# Patient Record
Sex: FEMALE | Race: OTHER | Employment: FULL TIME | URBAN - METROPOLITAN AREA
[De-identification: names, ages, dates, MRNs, and addresses within clinical notes are randomized per-mention and may not be internally consistent; named-entity substitution may affect disease eponyms.]

---

## 2017-02-25 ENCOUNTER — OFFICE VISIT (OUTPATIENT)
Dept: FAMILY MEDICINE CLINIC | Facility: CLINIC | Age: 37
End: 2017-02-25

## 2017-02-25 ENCOUNTER — TELEPHONE (OUTPATIENT)
Dept: FAMILY MEDICINE CLINIC | Facility: CLINIC | Age: 37
End: 2017-02-25

## 2017-02-25 VITALS
BODY MASS INDEX: 20.65 KG/M2 | WEIGHT: 112.19 LBS | HEART RATE: 92 BPM | TEMPERATURE: 98 F | SYSTOLIC BLOOD PRESSURE: 138 MMHG | RESPIRATION RATE: 16 BRPM | HEIGHT: 62 IN | DIASTOLIC BLOOD PRESSURE: 94 MMHG

## 2017-02-25 DIAGNOSIS — J45.30 MILD PERSISTENT ASTHMA WITHOUT COMPLICATION: ICD-10-CM

## 2017-02-25 DIAGNOSIS — J06.9 UPPER RESPIRATORY TRACT INFECTION, UNSPECIFIED TYPE: Primary | ICD-10-CM

## 2017-02-25 PROCEDURE — 99213 OFFICE O/P EST LOW 20 MIN: CPT | Performed by: FAMILY MEDICINE

## 2017-02-25 PROCEDURE — 99212 OFFICE O/P EST SF 10 MIN: CPT | Performed by: FAMILY MEDICINE

## 2017-02-25 RX ORDER — ALBUTEROL SULFATE 90 UG/1
2 AEROSOL, METERED RESPIRATORY (INHALATION) EVERY 4 HOURS PRN
Qty: 18 G | Refills: 1 | Status: SHIPPED | OUTPATIENT
Start: 2017-02-25 | End: 2017-06-05

## 2017-02-25 RX ORDER — ENALAPRIL MALEATE 5 MG/1
TABLET ORAL
Qty: 180 TABLET | Refills: 0 | Status: SHIPPED | OUTPATIENT
Start: 2017-02-25 | End: 2017-06-05

## 2017-02-25 RX ORDER — PROMETHAZINE HYDROCHLORIDE AND CODEINE PHOSPHATE 6.25; 1 MG/5ML; MG/5ML
5 SYRUP ORAL EVERY 6 HOURS PRN
Qty: 180 ML | Refills: 0 | Status: SHIPPED | OUTPATIENT
Start: 2017-02-25 | End: 2017-06-05

## 2017-02-25 RX ORDER — DEXAMETHASONE 4 MG/1
4 TABLET ORAL
Qty: 4 TABLET | Refills: 0 | Status: SHIPPED | OUTPATIENT
Start: 2017-02-25 | End: 2017-03-01

## 2017-02-25 RX ORDER — AZELASTINE 1 MG/ML
2 SPRAY, METERED NASAL 2 TIMES DAILY
Qty: 1 BOTTLE | Refills: 0 | Status: SHIPPED | OUTPATIENT
Start: 2017-02-25 | End: 2017-06-05

## 2017-02-25 RX ORDER — ENALAPRIL MALEATE 10 MG/1
TABLET ORAL
Qty: 180 TABLET | Refills: 0 | Status: SHIPPED | OUTPATIENT
Start: 2017-02-25 | End: 2017-06-05

## 2017-02-25 NOTE — TELEPHONE ENCOUNTER
Patient requesting refill. Current outpatient prescriptions:   •  Amphetamine-Dextroamphet ER (ADDERALL XR) 30 MG Oral Capsule SR 24 Hr, Take 1 capsule (30 mg total) by mouth daily. , Disp: 30 capsule, Rfl: 0

## 2017-02-25 NOTE — PROGRESS NOTES
Patient ID: Angel Chavez is a 31year old female. HPI  Patient presents with:  Sore Throat  Cough  Sinusitis      She states 2 days ago she started with a cough, runny nose, sore throat and some sinus pressure.   She states that the cough is really with clear rhinorrhea. OP: clear post nasal drainage without cobblestoning. Tonsils: normal   Eyes: Conjunctivae are normal.   Neck: Normal range of motion. Neck supple. No thyromegaly present.    Cardiovascular: Normal rate, regular rhythm and normal hea persist.  Take medicine (if given) as prescribed. Approach to treatment discussed and patient/family member understands and agrees to plan.            Nisha Chapa,   2/25/2017

## 2017-02-28 RX ORDER — DEXTROAMPHETAMINE SACCHARATE, AMPHETAMINE ASPARTATE MONOHYDRATE, DEXTROAMPHETAMINE SULFATE AND AMPHETAMINE SULFATE 7.5; 7.5; 7.5; 7.5 MG/1; MG/1; MG/1; MG/1
30 CAPSULE, EXTENDED RELEASE ORAL DAILY
Qty: 30 CAPSULE | Refills: 0 | Status: SHIPPED | OUTPATIENT
Start: 2017-03-03 | End: 2017-04-08

## 2017-04-08 NOTE — TELEPHONE ENCOUNTER
Please advise on refill. Pending medication.  Thank you    Refill Protocol Appointment Criteria  · Appointment scheduled in the past 6 months or in the next 3 months  Recent Visits       Provider Department Primary Dx    1 month ago DO Callie Velásquez

## 2017-04-10 RX ORDER — DEXTROAMPHETAMINE SACCHARATE, AMPHETAMINE ASPARTATE MONOHYDRATE, DEXTROAMPHETAMINE SULFATE AND AMPHETAMINE SULFATE 7.5; 7.5; 7.5; 7.5 MG/1; MG/1; MG/1; MG/1
30 CAPSULE, EXTENDED RELEASE ORAL DAILY
Qty: 30 CAPSULE | Refills: 0 | Status: SHIPPED | OUTPATIENT
Start: 2017-04-10 | End: 2017-05-08

## 2017-04-12 NOTE — TELEPHONE ENCOUNTER
No further ation needed. Rx picked up on Monday and patient has a pending appt with MD on 4/15. No further action neded.

## 2017-05-08 ENCOUNTER — TELEPHONE (OUTPATIENT)
Dept: FAMILY MEDICINE CLINIC | Facility: CLINIC | Age: 37
End: 2017-05-08

## 2017-05-08 RX ORDER — DEXTROAMPHETAMINE SACCHARATE, AMPHETAMINE ASPARTATE MONOHYDRATE, DEXTROAMPHETAMINE SULFATE AND AMPHETAMINE SULFATE 7.5; 7.5; 7.5; 7.5 MG/1; MG/1; MG/1; MG/1
30 CAPSULE, EXTENDED RELEASE ORAL DAILY
Qty: 30 CAPSULE | Refills: 0 | Status: SHIPPED | OUTPATIENT
Start: 2017-05-08 | End: 2017-06-05

## 2017-05-08 NOTE — TELEPHONE ENCOUNTER
Pt requesting a refill for Adderall, Pt stts due to her work schedule she is unable to schedule an OV.

## 2017-06-05 ENCOUNTER — OFFICE VISIT (OUTPATIENT)
Dept: FAMILY MEDICINE CLINIC | Facility: CLINIC | Age: 37
End: 2017-06-05

## 2017-06-05 VITALS
BODY MASS INDEX: 20.06 KG/M2 | HEART RATE: 94 BPM | HEIGHT: 62 IN | DIASTOLIC BLOOD PRESSURE: 78 MMHG | SYSTOLIC BLOOD PRESSURE: 114 MMHG | WEIGHT: 109 LBS | TEMPERATURE: 98 F

## 2017-06-05 DIAGNOSIS — J30.1 ALLERGIC RHINITIS DUE TO POLLEN, UNSPECIFIED RHINITIS SEASONALITY: ICD-10-CM

## 2017-06-05 DIAGNOSIS — Z23 NEED FOR VACCINATION: ICD-10-CM

## 2017-06-05 DIAGNOSIS — I10 ESSENTIAL HYPERTENSION: ICD-10-CM

## 2017-06-05 DIAGNOSIS — Z00.00 ADULT GENERAL MEDICAL EXAM: Primary | ICD-10-CM

## 2017-06-05 DIAGNOSIS — N05.5 MEMBRANOPROLIFERATIVE NEPHROPATHY: ICD-10-CM

## 2017-06-05 DIAGNOSIS — F90.0 ATTENTION DEFICIT HYPERACTIVITY DISORDER (ADHD), PREDOMINANTLY INATTENTIVE TYPE: ICD-10-CM

## 2017-06-05 PROCEDURE — 99395 PREV VISIT EST AGE 18-39: CPT | Performed by: FAMILY MEDICINE

## 2017-06-05 PROCEDURE — 90471 IMMUNIZATION ADMIN: CPT | Performed by: FAMILY MEDICINE

## 2017-06-05 PROCEDURE — 90715 TDAP VACCINE 7 YRS/> IM: CPT | Performed by: FAMILY MEDICINE

## 2017-06-05 PROCEDURE — 99212 OFFICE O/P EST SF 10 MIN: CPT | Performed by: FAMILY MEDICINE

## 2017-06-05 RX ORDER — ENALAPRIL MALEATE 5 MG/1
TABLET ORAL
Qty: 180 TABLET | Refills: 0 | Status: SHIPPED | OUTPATIENT
Start: 2017-06-05 | End: 2018-01-29

## 2017-06-05 RX ORDER — ENALAPRIL MALEATE 10 MG/1
TABLET ORAL
Qty: 180 TABLET | Refills: 0 | Status: SHIPPED | OUTPATIENT
Start: 2017-06-05 | End: 2018-01-29

## 2017-06-05 RX ORDER — DEXTROAMPHETAMINE SACCHARATE, AMPHETAMINE ASPARTATE MONOHYDRATE, DEXTROAMPHETAMINE SULFATE AND AMPHETAMINE SULFATE 7.5; 7.5; 7.5; 7.5 MG/1; MG/1; MG/1; MG/1
30 CAPSULE, EXTENDED RELEASE ORAL DAILY
Qty: 30 CAPSULE | Refills: 0 | Status: SHIPPED | OUTPATIENT
Start: 2017-06-05 | End: 2017-07-05

## 2017-06-05 NOTE — PROGRESS NOTES
Patient ID: Francoise Bhakta is a 31year old female. HPI  Patient presents with:  Physical  Medication Request    She usually sees my partner Dr. Aruna Gutierrez. She is here for a physical exam but has medications that she needs refill.   She has not seen her n rash.   Allergic/Immunologic: Positive for environmental allergies. Negative for food allergies. Neurological: Negative for dizziness, seizures, speech difficulty and light-headedness. Hematological: Negative for adenopathy.  Does not bruise/bleed easil Nasal Solution 2 sprays by Nasal route 2 (two) times daily. Disp: 1 Bottle Rfl: 0   Cholecalciferol (VITAMIN D-3) 5000 UNITS Oral Tab Take 1 tablet by mouth.  Disp:  Rfl:      Allergies:No Known Allergies   PHYSICAL EXAM:   Physical Exam  Physical Exam   Co ordered  Essential hypertension  -     Nephrology - Dr Davey Mancuso  -     Enalapril Maleate 10 MG Oral Tab; TAKE 1 TABLET BY MOUTH   TWICE A DAY  -     Enalapril Maleate 5 MG Oral Tab; TAKE 1 TABLET BY MOUTH   TWICE A DAY    Attention deficit hyperactivity di

## 2017-06-06 NOTE — TELEPHONE ENCOUNTER
Refill Protocol Appointment Criteria: Refilled per protocol    · Appointment scheduled in the past 6 months or in the next 3 months  Recent Visits       Provider Department Primary Dx    Handy Penaloza, [facility], Brock Rodrigues

## 2017-06-17 ENCOUNTER — LAB ENCOUNTER (OUTPATIENT)
Dept: LAB | Facility: HOSPITAL | Age: 37
End: 2017-06-17
Attending: FAMILY MEDICINE
Payer: COMMERCIAL

## 2017-06-17 DIAGNOSIS — Z00.00 ADULT GENERAL MEDICAL EXAM: ICD-10-CM

## 2017-06-17 DIAGNOSIS — N05.5 MEMBRANOPROLIFERATIVE NEPHROPATHY: ICD-10-CM

## 2017-06-17 PROCEDURE — 82570 ASSAY OF URINE CREATININE: CPT

## 2017-06-17 PROCEDURE — 36415 COLL VENOUS BLD VENIPUNCTURE: CPT

## 2017-06-17 PROCEDURE — 82043 UR ALBUMIN QUANTITATIVE: CPT

## 2017-06-17 PROCEDURE — 80053 COMPREHEN METABOLIC PANEL: CPT

## 2017-06-17 PROCEDURE — 85025 COMPLETE CBC W/AUTO DIFF WBC: CPT

## 2017-06-17 PROCEDURE — 84443 ASSAY THYROID STIM HORMONE: CPT

## 2017-06-17 PROCEDURE — 80061 LIPID PANEL: CPT

## 2017-07-01 ENCOUNTER — HOSPITAL (OUTPATIENT)
Dept: OTHER | Age: 37
End: 2017-07-01
Attending: EMERGENCY MEDICINE

## 2017-07-02 ENCOUNTER — HOSPITAL ENCOUNTER (EMERGENCY)
Facility: HOSPITAL | Age: 37
Discharge: HOME | End: 2017-07-03
Attending: EMERGENCY MEDICINE
Payer: COMMERCIAL

## 2017-07-02 VITALS
RESPIRATION RATE: 20 BRPM | DIASTOLIC BLOOD PRESSURE: 63 MMHG | SYSTOLIC BLOOD PRESSURE: 96 MMHG | HEART RATE: 68 BPM | WEIGHT: 109 LBS | TEMPERATURE: 98 F | BODY MASS INDEX: 20.06 KG/M2 | OXYGEN SATURATION: 99 % | HEIGHT: 62 IN

## 2017-07-02 DIAGNOSIS — L08.9 CAT BITE OF LEFT HAND WITH INFECTION, INITIAL ENCOUNTER: Primary | ICD-10-CM

## 2017-07-02 DIAGNOSIS — S61.452A CAT BITE OF LEFT HAND WITH INFECTION, INITIAL ENCOUNTER: Primary | ICD-10-CM
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
## 2017-07-02 PROCEDURE — 99283 EMERGENCY DEPT VISIT LOW MDM: CPT

## 2017-07-02 PROCEDURE — 10060 I&D ABSCESS SIMPLE/SINGLE: CPT

## 2017-07-02 RX ORDER — AMOXICILLIN AND CLAVULANATE POTASSIUM 875; 125 MG/1; MG/1
1 TABLET, FILM COATED ORAL ONCE
Status: COMPLETED | OUTPATIENT
Start: 2017-07-02 | End: 2017-07-03

## 2017-07-02 RX ORDER — BUPIVACAINE HYDROCHLORIDE 5 MG/ML
30 INJECTION, SOLUTION EPIDURAL; INTRACAUDAL ONCE
Status: COMPLETED | OUTPATIENT
Start: 2017-07-02 | End: 2017-07-02

## 2017-07-03 ENCOUNTER — HOSPITAL ENCOUNTER (EMERGENCY)
Facility: HOSPITAL | Age: 37
Discharge: HOME | End: 2017-07-04
Attending: EMERGENCY MEDICINE
Payer: COMMERCIAL

## 2017-07-03 DIAGNOSIS — F90.0 ATTENTION DEFICIT HYPERACTIVITY DISORDER (ADHD), PREDOMINANTLY INATTENTIVE TYPE: ICD-10-CM

## 2017-07-03 DIAGNOSIS — L08.9 CAT BITE OF LEFT HAND INCLUDING FINGERS WITH INFECTION, SUBSEQUENT ENCOUNTER: Primary | ICD-10-CM
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
## 2017-07-03 DIAGNOSIS — S61.452D CAT BITE OF LEFT HAND INCLUDING FINGERS WITH INFECTION, SUBSEQUENT ENCOUNTER: Primary | ICD-10-CM

## 2017-07-03 PROCEDURE — 99281 EMR DPT VST MAYX REQ PHY/QHP: CPT

## 2017-07-03 RX ORDER — DEXTROAMPHETAMINE SACCHARATE, AMPHETAMINE ASPARTATE MONOHYDRATE, DEXTROAMPHETAMINE SULFATE AND AMPHETAMINE SULFATE 7.5; 7.5; 7.5; 7.5 MG/1; MG/1; MG/1; MG/1
30 CAPSULE, EXTENDED RELEASE ORAL DAILY
Qty: 30 CAPSULE | Refills: 0 | OUTPATIENT
Start: 2017-07-03 | End: 2017-08-02

## 2017-07-03 RX ORDER — AMOXICILLIN AND CLAVULANATE POTASSIUM 875; 125 MG/1; MG/1
1 TABLET, FILM COATED ORAL 2 TIMES DAILY
Qty: 20 TABLET | Refills: 0 | Status: SHIPPED | OUTPATIENT
Start: 2017-07-03 | End: 2017-07-13

## 2017-07-03 NOTE — ED PROVIDER NOTES
Patient Seen in: [facility] AND [facility] Emergency Department    History   Patient presents with:  Bite (integumentary)      HPI    The patient presents complaining of swelling, redness and pain in her left dorsal hand after being bitten by a cat in this area reviewed. All other systems reviewed and negative except as noted above. PSFH elements reviewed from today and agreed except as otherwise stated in HPI.     Physical Exam     ED Triage Vitals [07/02/17 0368]  BP: 96/63  Pulse: 68  Resp: 20  Temp: 98 verbal consent from the patient to drain the abscess. Patient was informed about the possibility of bleeding, pain and worsening of the condition. The abscess was incised with a scalpel  and a small amount of purulent drainage was expressed.   I irrigated

## 2017-07-04 VITALS
HEIGHT: 62 IN | RESPIRATION RATE: 16 BRPM | WEIGHT: 109 LBS | OXYGEN SATURATION: 98 % | TEMPERATURE: 98 F | SYSTOLIC BLOOD PRESSURE: 128 MMHG | BODY MASS INDEX: 20.06 KG/M2 | DIASTOLIC BLOOD PRESSURE: 89 MMHG | HEART RATE: 72 BPM

## 2017-07-04 NOTE — ED NOTES
Reviewed all discharge information with patient. Patient verbalized understanding, no further questions or complaints at this time. Patient is alert and oriented x4, in no apparent distress, ambulating with steady gait. No IV in.  Instructed patient to retu

## 2017-07-04 NOTE — ED PROVIDER NOTES
Patient Seen in: [facility] Emergency Department    History   Patient presents with:  Cellulitis (integumentary, infectious)      HPI    Patient return for a wound check. Seen yesterday after a cat bite to her left dorsal hand 2 days ago.  Signific [07/03/17 2316]  Pulse: 55 [07/03/17 2316]  Resp: 16 [07/03/17 2316]  Temp: (!) 97.4 °F (36.3 °C) [07/03/17 2316]  Temp src: Oral [07/04/17 0049]  SpO2: 99 % [07/03/17 2316]  O2 Device: None (Room air) [07/04/17 0049]    Physical Exam   Constitutional: She Medication List as of 7/4/2017 12:36 AM

## 2017-07-05 NOTE — TELEPHONE ENCOUNTER
VS pt want you to be her pcp. She has seen you twice. She wants to know if she needs to come in or if you can give her the refill.

## 2017-07-08 ENCOUNTER — OFFICE VISIT (OUTPATIENT)
Dept: FAMILY MEDICINE CLINIC | Facility: CLINIC | Age: 37
End: 2017-07-08

## 2017-07-08 VITALS
SYSTOLIC BLOOD PRESSURE: 124 MMHG | RESPIRATION RATE: 12 BRPM | HEART RATE: 90 BPM | BODY MASS INDEX: 20.39 KG/M2 | DIASTOLIC BLOOD PRESSURE: 84 MMHG | WEIGHT: 110.81 LBS | TEMPERATURE: 98 F | HEIGHT: 62 IN

## 2017-07-08 DIAGNOSIS — F90.0 ATTENTION DEFICIT HYPERACTIVITY DISORDER (ADHD), PREDOMINANTLY INATTENTIVE TYPE: ICD-10-CM

## 2017-07-08 DIAGNOSIS — J45.30 MILD PERSISTENT ASTHMA WITHOUT COMPLICATION: ICD-10-CM

## 2017-07-08 DIAGNOSIS — J30.1 ALLERGIC RHINITIS DUE TO POLLEN, UNSPECIFIED RHINITIS SEASONALITY: ICD-10-CM
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
## 2017-07-08 DIAGNOSIS — S61.452A OPEN BITE OF LEFT HAND WITHOUT FOREIGN BODY, INITIAL ENCOUNTER: Primary | ICD-10-CM

## 2017-07-08 PROCEDURE — 99212 OFFICE O/P EST SF 10 MIN: CPT | Performed by: FAMILY MEDICINE

## 2017-07-08 PROCEDURE — 99214 OFFICE O/P EST MOD 30 MIN: CPT | Performed by: FAMILY MEDICINE

## 2017-07-08 RX ORDER — DEXTROAMPHETAMINE SACCHARATE, AMPHETAMINE ASPARTATE, DEXTROAMPHETAMINE SULFATE AND AMPHETAMINE SULFATE 7.5; 7.5; 7.5; 7.5 MG/1; MG/1; MG/1; MG/1
TABLET ORAL DAILY
COMMUNITY
End: 2017-07-31

## 2017-07-08 RX ORDER — ALBUTEROL SULFATE 90 UG/1
AEROSOL, METERED RESPIRATORY (INHALATION)
Qty: 8.5 G | Refills: 2 | Status: SHIPPED | OUTPATIENT
Start: 2017-07-08 | End: 2018-01-29

## 2017-07-08 RX ORDER — DEXTROAMPHETAMINE SACCHARATE, AMPHETAMINE ASPARTATE MONOHYDRATE, DEXTROAMPHETAMINE SULFATE AND AMPHETAMINE SULFATE 7.5; 7.5; 7.5; 7.5 MG/1; MG/1; MG/1; MG/1
30 CAPSULE, EXTENDED RELEASE ORAL DAILY
Qty: 30 CAPSULE | Refills: 0 | Status: SHIPPED | OUTPATIENT
Start: 2017-07-08 | End: 2017-08-07

## 2017-07-08 RX ORDER — DEXTROAMPHETAMINE SACCHARATE, AMPHETAMINE ASPARTATE MONOHYDRATE, DEXTROAMPHETAMINE SULFATE AND AMPHETAMINE SULFATE 7.5; 7.5; 7.5; 7.5 MG/1; MG/1; MG/1; MG/1
30 CAPSULE, EXTENDED RELEASE ORAL DAILY
Qty: 30 CAPSULE | Refills: 0 | Status: SHIPPED | OUTPATIENT
Start: 2017-08-07 | End: 2017-07-31

## 2017-07-08 RX ORDER — DEXTROAMPHETAMINE SACCHARATE, AMPHETAMINE ASPARTATE MONOHYDRATE, DEXTROAMPHETAMINE SULFATE AND AMPHETAMINE SULFATE 7.5; 7.5; 7.5; 7.5 MG/1; MG/1; MG/1; MG/1
30 CAPSULE, EXTENDED RELEASE ORAL DAILY
Qty: 30 CAPSULE | Refills: 0 | Status: SHIPPED | OUTPATIENT
Start: 2017-09-06 | End: 2017-07-31

## 2017-07-31 ENCOUNTER — OFFICE VISIT (OUTPATIENT)
Dept: NEPHROLOGY | Facility: CLINIC | Age: 37
End: 2017-07-31

## 2017-07-31 VITALS
BODY MASS INDEX: 20.18 KG/M2 | HEIGHT: 62 IN | SYSTOLIC BLOOD PRESSURE: 121 MMHG | TEMPERATURE: 98 F | HEART RATE: 96 BPM | WEIGHT: 109.63 LBS | DIASTOLIC BLOOD PRESSURE: 83 MMHG

## 2017-07-31 DIAGNOSIS — R80.1 PERSISTENT PROTEINURIA: ICD-10-CM

## 2017-07-31 DIAGNOSIS — N04.8 MEMBRANOUS NEPHROSIS: Primary | ICD-10-CM

## 2017-07-31 PROCEDURE — 99212 OFFICE O/P EST SF 10 MIN: CPT | Performed by: INTERNAL MEDICINE

## 2017-07-31 PROCEDURE — 99214 OFFICE O/P EST MOD 30 MIN: CPT | Performed by: INTERNAL MEDICINE

## 2017-07-31 NOTE — PROGRESS NOTES
Progress Note   Carlos Moreno is a 30 yrs old female with pmh of Asthma, Hypertension, HL, membranous  who presented today for FU.  She was first seen on 3/16 for evaluation of proteinuria    Per patient she is known to have proteinuria since 2002 an reviewed. No pertinent surgical history. Medications (Active prior to today's visit):    Current Outpatient Prescriptions:  Amphetamine-Dextroamphet ER (ADDERALL XR) 30 MG Oral Capsule SR 24 Hr Take 1 capsule (30 mg total) by mouth daily.  Disp: 30 c 07/31/17  1025   BP: 121/83   Pulse: 96   Temp: 98.2 °F (36.8 °C)       PHYSICAL EXAM:   Constitutional: appears well hydrated alert and responsive no acute distress noted  Head/Face: normocephalic  Eyes/Vision: normal extraocular motion is intact  Nose/Mo ordered in this encounter    Imaging & Referrals:  None     7/31/2017  Luz Marina Lopez MD

## 2017-08-05 ENCOUNTER — APPOINTMENT (OUTPATIENT)
Dept: LAB | Facility: HOSPITAL | Age: 37
End: 2017-08-05
Attending: INTERNAL MEDICINE
Payer: COMMERCIAL

## 2017-08-05 DIAGNOSIS — N04.8 MEMBRANOUS NEPHROSIS: ICD-10-CM

## 2017-08-05 DIAGNOSIS — R80.1 PERSISTENT PROTEINURIA: ICD-10-CM

## 2017-08-05 LAB
PROT 24H UR-MRATE: 678.4 MG/24H (ref 0–150)
SPECIMEN VOL 24H UR: 2120 ML/24H

## 2017-08-05 PROCEDURE — 84156 ASSAY OF PROTEIN URINE: CPT

## 2017-10-09 ENCOUNTER — OFFICE VISIT (OUTPATIENT)
Dept: FAMILY MEDICINE CLINIC | Facility: CLINIC | Age: 37
End: 2017-10-09

## 2017-10-09 ENCOUNTER — HOSPITAL ENCOUNTER (OUTPATIENT)
Dept: GENERAL RADIOLOGY | Age: 37
Discharge: HOME | End: 2017-10-09
Attending: FAMILY MEDICINE
Payer: COMMERCIAL

## 2017-10-09 VITALS
HEART RATE: 105 BPM | BODY MASS INDEX: 20 KG/M2 | WEIGHT: 108 LBS | DIASTOLIC BLOOD PRESSURE: 77 MMHG | TEMPERATURE: 100 F | SYSTOLIC BLOOD PRESSURE: 117 MMHG

## 2017-10-09 DIAGNOSIS — R05.9 COUGH: Primary | ICD-10-CM

## 2017-10-09 DIAGNOSIS — R05.9 COUGH: ICD-10-CM

## 2017-10-09 DIAGNOSIS — F90.0 ATTENTION DEFICIT HYPERACTIVITY DISORDER (ADHD), PREDOMINANTLY INATTENTIVE TYPE: ICD-10-CM

## 2017-10-09 DIAGNOSIS — R68.83 CHILLS: ICD-10-CM

## 2017-10-09 DIAGNOSIS — J45.21 MILD INTERMITTENT ASTHMA WITH ACUTE EXACERBATION: ICD-10-CM

## 2017-10-09 DIAGNOSIS — R50.9 FEVER, UNSPECIFIED FEVER CAUSE: ICD-10-CM

## 2017-10-09 PROCEDURE — 99214 OFFICE O/P EST MOD 30 MIN: CPT | Performed by: FAMILY MEDICINE

## 2017-10-09 PROCEDURE — 99212 OFFICE O/P EST SF 10 MIN: CPT | Performed by: FAMILY MEDICINE

## 2017-10-09 PROCEDURE — 71020 XR CHEST PA + LAT CHEST (CPT=71020): CPT | Performed by: FAMILY MEDICINE

## 2017-10-09 RX ORDER — DEXTROAMPHETAMINE SACCHARATE, AMPHETAMINE ASPARTATE MONOHYDRATE, DEXTROAMPHETAMINE SULFATE AND AMPHETAMINE SULFATE 7.5; 7.5; 7.5; 7.5 MG/1; MG/1; MG/1; MG/1
30 CAPSULE, EXTENDED RELEASE ORAL DAILY
Qty: 30 CAPSULE | Refills: 0 | Status: SHIPPED | OUTPATIENT
Start: 2017-12-08 | End: 2017-10-12

## 2017-10-09 RX ORDER — DEXTROAMPHETAMINE SACCHARATE, AMPHETAMINE ASPARTATE MONOHYDRATE, DEXTROAMPHETAMINE SULFATE AND AMPHETAMINE SULFATE 7.5; 7.5; 7.5; 7.5 MG/1; MG/1; MG/1; MG/1
30 CAPSULE, EXTENDED RELEASE ORAL DAILY
Qty: 30 CAPSULE | Refills: 0 | Status: SHIPPED | OUTPATIENT
Start: 2017-10-09 | End: 2018-01-08

## 2017-10-09 RX ORDER — DEXTROAMPHETAMINE SACCHARATE, AMPHETAMINE ASPARTATE MONOHYDRATE, DEXTROAMPHETAMINE SULFATE AND AMPHETAMINE SULFATE 7.5; 7.5; 7.5; 7.5 MG/1; MG/1; MG/1; MG/1
30 CAPSULE, EXTENDED RELEASE ORAL DAILY
Qty: 30 CAPSULE | Refills: 0 | Status: SHIPPED | OUTPATIENT
Start: 2017-11-08 | End: 2017-10-12

## 2017-10-09 RX ORDER — AZITHROMYCIN 250 MG/1
TABLET, FILM COATED ORAL
Qty: 6 TABLET | Refills: 0 | Status: SHIPPED | OUTPATIENT
Start: 2017-10-09 | End: 2017-10-14

## 2017-10-09 NOTE — PROGRESS NOTES
Patient ID: Amrit Regan is a 31year old female. HPI  Patient presents with:  Medication Request  Cough    She states for 2 weeks now she has had a dry cough. She feels chest congestion. She cannot sleep at night due to the cough.   She states sh Disp: 8.5 g Rfl: 2   Enalapril Maleate 10 MG Oral Tab TAKE 1 TABLET BY MOUTH   TWICE A DAY Disp: 180 tablet Rfl: 0   Enalapril Maleate 5 MG Oral Tab TAKE 1 TABLET BY MOUTH   TWICE A DAY Disp: 180 tablet Rfl: 0   triamcinolone acetonide 0.1 % External Ointm XR CHEST PA + LAT CHEST (CPT=04059); Future    Attention deficit hyperactivity disorder (ADHD), predominantly inattentive type  -     Amphetamine-Dextroamphet ER (ADDERALL XR) 30 MG Oral Capsule SR 24 Hr; Take 1 capsule (30 mg total) by mouth daily.   -

## 2017-10-12 ENCOUNTER — OFFICE VISIT (OUTPATIENT)
Dept: FAMILY MEDICINE CLINIC | Facility: CLINIC | Age: 37
End: 2017-10-12

## 2017-10-12 ENCOUNTER — HOSPITAL ENCOUNTER (EMERGENCY)
Facility: HOSPITAL | Age: 37
Discharge: HOME | End: 2017-10-13
Attending: EMERGENCY MEDICINE
Payer: COMMERCIAL

## 2017-10-12 ENCOUNTER — TELEPHONE (OUTPATIENT)
Dept: FAMILY MEDICINE CLINIC | Facility: CLINIC | Age: 37
End: 2017-10-12

## 2017-10-12 ENCOUNTER — APPOINTMENT (OUTPATIENT)
Dept: GENERAL RADIOLOGY | Facility: HOSPITAL | Age: 37
End: 2017-10-12
Attending: EMERGENCY MEDICINE
Payer: COMMERCIAL

## 2017-10-12 VITALS
WEIGHT: 109.81 LBS | TEMPERATURE: 99 F | SYSTOLIC BLOOD PRESSURE: 140 MMHG | BODY MASS INDEX: 20.21 KG/M2 | HEART RATE: 107 BPM | HEIGHT: 62 IN | DIASTOLIC BLOOD PRESSURE: 86 MMHG | RESPIRATION RATE: 18 BRPM

## 2017-10-12 DIAGNOSIS — J40 BRONCHITIS: ICD-10-CM

## 2017-10-12 DIAGNOSIS — J45.41 MODERATE PERSISTENT ASTHMA WITH EXACERBATION: Primary | ICD-10-CM

## 2017-10-12 DIAGNOSIS — J45.41 MODERATE PERSISTENT ASTHMA WITH ACUTE EXACERBATION: Primary | ICD-10-CM

## 2017-10-12 DIAGNOSIS — J06.9 UPPER RESPIRATORY TRACT INFECTION, UNSPECIFIED TYPE: ICD-10-CM

## 2017-10-12 PROCEDURE — 94645 CONT INHLJ TX EACH ADDL HOUR: CPT

## 2017-10-12 PROCEDURE — 94644 CONT INHLJ TX 1ST HOUR: CPT

## 2017-10-12 PROCEDURE — 94640 AIRWAY INHALATION TREATMENT: CPT

## 2017-10-12 PROCEDURE — 99212 OFFICE O/P EST SF 10 MIN: CPT | Performed by: FAMILY MEDICINE

## 2017-10-12 PROCEDURE — 99214 OFFICE O/P EST MOD 30 MIN: CPT | Performed by: FAMILY MEDICINE

## 2017-10-12 PROCEDURE — 99284 EMERGENCY DEPT VISIT MOD MDM: CPT

## 2017-10-12 PROCEDURE — 71010 XR CHEST AP PORTABLE  (CPT=71010): CPT | Performed by: EMERGENCY MEDICINE

## 2017-10-12 PROCEDURE — 81025 URINE PREGNANCY TEST: CPT

## 2017-10-12 PROCEDURE — 96372 THER/PROPH/DIAG INJ SC/IM: CPT | Performed by: FAMILY MEDICINE

## 2017-10-12 RX ORDER — BUDESONIDE AND FORMOTEROL FUMARATE DIHYDRATE 160; 4.5 UG/1; UG/1
2 AEROSOL RESPIRATORY (INHALATION) 2 TIMES DAILY
Qty: 1 INHALER | Refills: 3 | Status: SHIPPED | OUTPATIENT
Start: 2017-10-12 | End: 2018-01-29 | Stop reason: ALTCHOICE

## 2017-10-12 RX ORDER — IPRATROPIUM BROMIDE AND ALBUTEROL SULFATE 2.5; .5 MG/3ML; MG/3ML
3 SOLUTION RESPIRATORY (INHALATION) ONCE
Status: COMPLETED | OUTPATIENT
Start: 2017-10-12 | End: 2017-10-12

## 2017-10-12 RX ORDER — PREDNISONE 20 MG/1
TABLET ORAL
Qty: 10 TABLET | Refills: 0 | Status: SHIPPED | OUTPATIENT
Start: 2017-10-12 | End: 2018-01-29 | Stop reason: ALTCHOICE

## 2017-10-12 RX ORDER — ALBUTEROL SULFATE 2.5 MG/3ML
10 SOLUTION RESPIRATORY (INHALATION) CONTINUOUS
Status: DISCONTINUED | OUTPATIENT
Start: 2017-10-12 | End: 2017-10-13

## 2017-10-12 RX ORDER — ALBUTEROL SULFATE 2.5 MG/3ML
2.5 SOLUTION RESPIRATORY (INHALATION) EVERY 4 HOURS PRN
Qty: 30 AMPULE | Refills: 0 | Status: SHIPPED | OUTPATIENT
Start: 2017-10-12 | End: 2017-11-11

## 2017-10-12 RX ORDER — PROMETHAZINE HYDROCHLORIDE AND CODEINE PHOSPHATE 6.25; 1 MG/5ML; MG/5ML
5 SYRUP ORAL EVERY 6 HOURS PRN
Qty: 180 ML | Refills: 0 | Status: SHIPPED | OUTPATIENT
Start: 2017-10-12 | End: 2018-01-29 | Stop reason: ALTCHOICE

## 2017-10-12 RX ORDER — METHYLPREDNISOLONE ACETATE 40 MG/ML
40 INJECTION, SUSPENSION INTRA-ARTICULAR; INTRALESIONAL; INTRAMUSCULAR; SOFT TISSUE ONCE
Status: COMPLETED | OUTPATIENT
Start: 2017-10-12 | End: 2017-10-12

## 2017-10-12 RX ADMIN — METHYLPREDNISOLONE ACETATE 40 MG: 40 INJECTION, SUSPENSION INTRA-ARTICULAR; INTRALESIONAL; INTRAMUSCULAR; SOFT TISSUE at 10:42:00

## 2017-10-12 NOTE — PROGRESS NOTES
Patient ID: Judy Lugo is a 31year old female. HPI  Patient presents with:  Cough  Nasal Congestion    Seen on 10/9/2017. Her chest x-ray is negative. We put her on Zithromax.   She states she has been coughing quite a bit at night and even whe Inhalation Aerosol Inhale 2 puffs into the lungs 2 (two) times daily.  Disp: 1 Can Rfl: 3   Albuterol Sulfate HFA (PROAIR HFA) 108 (90 Base) MCG/ACT Inhalation Aero Soln INHALE TWO   PUFFS INTO THE LUNGS EVERY 4 (FOUR) HOURS AS NEEDED   FOR WHEEZING OR SHOR Diagnoses and all orders for this visit:    Moderate persistent asthma with exacerbation  -     predniSONE 20 MG Oral Tab; Take 2 by mouth at same time daily for 5 days.  (Best taken at [facility] or [facility])  -     methylPREDNISolone acetate (DEPO-medrol)

## 2017-10-13 VITALS
RESPIRATION RATE: 20 BRPM | OXYGEN SATURATION: 92 % | HEIGHT: 62 IN | SYSTOLIC BLOOD PRESSURE: 126 MMHG | TEMPERATURE: 99 F | WEIGHT: 109 LBS | DIASTOLIC BLOOD PRESSURE: 87 MMHG | BODY MASS INDEX: 20.06 KG/M2 | HEART RATE: 91 BPM

## 2017-10-13 NOTE — ED INITIAL ASSESSMENT (HPI)
Pt reports nonproductive cough. Pt has inspiratory wheezes. Pt saw PCP today was given a steroid injection. Pt reports intermittent low grade fever. Pt reports sore throat, nasal congestion.

## 2017-10-13 NOTE — ED PROVIDER NOTES
Patient Seen in: [facility] AND [facility] Emergency Department    History   Patient presents with:  Cough/URI    Stated Complaint: cough/fever    HPI    81-year-old female presents for complaint of cough and asthma exacerbation for the past 4 days.   She was sta except as noted above. PSFH elements reviewed from today and agreed except as otherwise stated in HPI.     Physical Exam   ED Triage Vitals [10/12/17 2109]  BP: 142/85  Pulse: 97  Resp: 20  Temp: 99 °F (37.2 °C)  Temp src: Oral  SpO2: 95 %  O2 Device: No cardiopulmonary process. Report faxed at 11:44 PM ET    Prachi Verdin M.D. This report has been electronically signed and verified by the Radiologist whose name is printed above.     DD:  10/12/2017/DT:  10/12/2017    Additional Information (per [facility]

## 2017-10-13 NOTE — TELEPHONE ENCOUNTER
On call note: Was called on 10/12/17 by mother who states pt continues to have sig SOB/wheezing. Pt was seen by Dr Rhett Oliveira today and was given prednisone shot and has nebulizer at home. No much better.  After discussion with mother, to go to ER for further evalu

## 2018-01-08 ENCOUNTER — TELEPHONE (OUTPATIENT)
Dept: FAMILY MEDICINE CLINIC | Facility: CLINIC | Age: 38
End: 2018-01-08

## 2018-01-08 DIAGNOSIS — F90.0 ATTENTION DEFICIT HYPERACTIVITY DISORDER (ADHD), PREDOMINANTLY INATTENTIVE TYPE: ICD-10-CM

## 2018-01-08 RX ORDER — DEXTROAMPHETAMINE SACCHARATE, AMPHETAMINE ASPARTATE MONOHYDRATE, DEXTROAMPHETAMINE SULFATE AND AMPHETAMINE SULFATE 7.5; 7.5; 7.5; 7.5 MG/1; MG/1; MG/1; MG/1
30 CAPSULE, EXTENDED RELEASE ORAL DAILY
Qty: 30 CAPSULE | Refills: 0 | Status: SHIPPED | OUTPATIENT
Start: 2018-01-08 | End: 2018-02-07

## 2018-01-30 NOTE — PROGRESS NOTES
Patient ID: Samuel Walton is a 30year old female. HPI  Patient presents with:  ADHD: medication refills   She has been taking her ADD medication. She does not have hyperactivity.   She works as a  and states this really helps h WHEEZING OR SHORTNESS OF BREATH.  Disp: 8.5 g Rfl: 2   Enalapril Maleate 10 MG Oral Tab TAKE 1 TABLET BY MOUTH   TWICE A DAY Disp: 180 tablet Rfl: 0   Enalapril Maleate 5 MG Oral Tab TAKE 1 TABLET BY MOUTH   TWICE A DAY Disp: 180 tablet Rfl: 0   triamcinolo Inhalation Aero Soln; INHALE TWO   PUFFS INTO THE LUNGS EVERY 4 (FOUR) HOURS AS NEEDED   FOR WHEEZING OR SHORTNESS OF BREATH. -     Budesonide-Formoterol Fumarate 160-4.5 MCG/ACT Inhalation Aerosol; Inhale 2 puffs into the lungs 2 (two) times daily.     Es

## 2018-02-05 ENCOUNTER — IMMUNIZATION (OUTPATIENT)
Dept: FAMILY MEDICINE CLINIC | Facility: CLINIC | Age: 38
End: 2018-02-05

## 2018-02-05 DIAGNOSIS — Z23 NEED FOR VACCINATION: ICD-10-CM

## 2018-02-05 PROCEDURE — 90686 IIV4 VACC NO PRSV 0.5 ML IM: CPT | Performed by: FAMILY MEDICINE

## 2018-02-05 PROCEDURE — 90471 IMMUNIZATION ADMIN: CPT | Performed by: FAMILY MEDICINE

## 2018-03-14 DIAGNOSIS — N05.5 MEMBRANOPROLIFERATIVE NEPHROPATHY: ICD-10-CM

## 2018-03-14 DIAGNOSIS — I10 ESSENTIAL HYPERTENSION: ICD-10-CM

## 2018-03-16 RX ORDER — ENALAPRIL MALEATE 5 MG/1
TABLET ORAL
Qty: 180 TABLET | Refills: 0 | Status: SHIPPED | OUTPATIENT
Start: 2018-03-16 | End: 2018-03-26 | Stop reason: SINTOL

## 2018-03-16 RX ORDER — ENALAPRIL MALEATE 10 MG/1
TABLET ORAL
Qty: 180 TABLET | Refills: 0 | Status: SHIPPED | OUTPATIENT
Start: 2018-03-16 | End: 2018-03-26 | Stop reason: SINTOL

## 2018-03-16 NOTE — TELEPHONE ENCOUNTER
Hypertensive Medications  Protocol Criteria:  · Appointment scheduled in the past 6 months or in the next 3 months  · BMP or CMP in the past 12 months  · Creatinine result < 2  Recent Outpatient Visits            1 month ago Attention deficit disorder (ADD Sig: TAKE 1 TABLET BY MOUTH   TWICE A DAY      ENALAPRIL MALEATE 10 MG Oral Tab [Pharmacy Med Name: Enalapril Maleate 10 MG Oral Tab] 180 tablet      Sig: TAKE 1 TABLET BY MOUTH   TWICE A DAY         LR 1-29-18 # 180  Refill approved per protocol.

## 2018-03-26 ENCOUNTER — APPOINTMENT (OUTPATIENT)
Dept: LAB | Facility: HOSPITAL | Age: 38
End: 2018-03-26
Attending: INTERNAL MEDICINE
Payer: COMMERCIAL

## 2018-03-26 ENCOUNTER — OFFICE VISIT (OUTPATIENT)
Dept: NEPHROLOGY | Facility: CLINIC | Age: 38
End: 2018-03-26

## 2018-03-26 VITALS
DIASTOLIC BLOOD PRESSURE: 74 MMHG | TEMPERATURE: 99 F | HEIGHT: 62 IN | SYSTOLIC BLOOD PRESSURE: 121 MMHG | WEIGHT: 108.19 LBS | BODY MASS INDEX: 19.91 KG/M2 | HEART RATE: 109 BPM

## 2018-03-26 DIAGNOSIS — N02.2 MEMBRANOUS NEPHROPATHY DETERMINED BY BIOPSY: Primary | ICD-10-CM

## 2018-03-26 DIAGNOSIS — R80.1 PERSISTENT PROTEINURIA: ICD-10-CM

## 2018-03-26 DIAGNOSIS — N02.2 MEMBRANOUS NEPHROPATHY DETERMINED BY BIOPSY: ICD-10-CM

## 2018-03-26 LAB
ALBUMIN SERPL BCP-MCNC: 4.1 G/DL (ref 3.5–4.8)
ANION GAP SERPL CALC-SCNC: 9 MMOL/L (ref 0–18)
BUN SERPL-MCNC: 11 MG/DL (ref 8–20)
BUN/CREAT SERPL: 14.1 (ref 10–20)
CALCIUM SERPL-MCNC: 9.5 MG/DL (ref 8.5–10.5)
CHLORIDE SERPL-SCNC: 104 MMOL/L (ref 95–110)
CO2 SERPL-SCNC: 24 MMOL/L (ref 22–32)
CREAT SERPL-MCNC: 0.78 MG/DL (ref 0.5–1.5)
CREAT UR-MCNC: 33.5 MG/DL
GLUCOSE SERPL-MCNC: 80 MG/DL (ref 70–99)
MICROALBUMIN UR-MCNC: 12.6 MG/DL (ref 0–1.8)
MICROALBUMIN/CREAT UR: 376.1 MG/G{CREAT} (ref 0–20)
OSMOLALITY UR CALC.SUM OF ELEC: 282 MOSM/KG (ref 275–295)
PHOSPHATE SERPL-MCNC: 2.3 MG/DL (ref 2.4–4.7)
POTASSIUM SERPL-SCNC: 3.7 MMOL/L (ref 3.3–5.1)
PROT UR-MCNC: 13 MG/DL
SODIUM SERPL-SCNC: 137 MMOL/L (ref 136–144)

## 2018-03-26 PROCEDURE — 80069 RENAL FUNCTION PANEL: CPT

## 2018-03-26 PROCEDURE — 36415 COLL VENOUS BLD VENIPUNCTURE: CPT

## 2018-03-26 PROCEDURE — 86255 FLUORESCENT ANTIBODY SCREEN: CPT

## 2018-03-26 PROCEDURE — 82043 UR ALBUMIN QUANTITATIVE: CPT

## 2018-03-26 PROCEDURE — 83516 IMMUNOASSAY NONANTIBODY: CPT

## 2018-03-26 PROCEDURE — 82570 ASSAY OF URINE CREATININE: CPT

## 2018-03-26 PROCEDURE — 84156 ASSAY OF PROTEIN URINE: CPT

## 2018-03-26 PROCEDURE — 99214 OFFICE O/P EST MOD 30 MIN: CPT | Performed by: INTERNAL MEDICINE

## 2018-03-26 PROCEDURE — 99212 OFFICE O/P EST SF 10 MIN: CPT | Performed by: INTERNAL MEDICINE

## 2018-03-26 RX ORDER — LOSARTAN POTASSIUM 50 MG/1
50 TABLET ORAL DAILY
Qty: 90 TABLET | Refills: 1 | Status: SHIPPED | OUTPATIENT
Start: 2018-03-26 | End: 2018-10-08

## 2018-03-26 NOTE — PATIENT INSTRUCTIONS
Follow up in 6 months  Stop enalapril and start losartan 50 mg daily   Blood and urine test as ordered in 10 days   Call in 2 weeks with home BP readings

## 2018-03-26 NOTE — PROGRESS NOTES
Progress Note     Richelle Walsh is a 30 yrs old female with pmh of Asthma, Hypertension, HL, membranous  who presented today for FU.  S    Per patient she is known to have proteinuria since 2002 and was found out at [facility]. She had a kidne Potassium 50 MG Oral Tab Take 1 tablet (50 mg total) by mouth daily. Disp: 90 tablet Rfl: 1   Amphetamine-Dextroamphet ER (ADDERALL XR) 30 MG Oral Capsule SR 24 Hr Take 1 capsule (30 mg total) by mouth daily.  Disp: 30 capsule Rfl: 0   Albuterol Sulfate HFA moist  Neck/Thyroid: neck is supple without adenopathy  Respiratory: lungs are clear to auscultation bilaterally  Cardiovascular: regular rate and rhythm   Abdomen: soft, non-tender, non-distended, BS normal  Skin/Hair: no abnormal bruising noted  Back/Spi Wayne Mckeon MD

## 2018-03-31 LAB — GBM AB, IGG (MAFD): 0 AU/ML

## 2018-04-09 ENCOUNTER — HOSPITAL ENCOUNTER (OUTPATIENT)
Dept: GENERAL RADIOLOGY | Facility: HOSPITAL | Age: 38
Discharge: HOME | End: 2018-04-09
Attending: ORTHOPAEDIC SURGERY
Payer: COMMERCIAL

## 2018-04-09 ENCOUNTER — OFFICE VISIT (OUTPATIENT)
Dept: ORTHOPEDICS CLINIC | Facility: CLINIC | Age: 38
End: 2018-04-09

## 2018-04-09 VITALS — RESPIRATION RATE: 16 BRPM | HEART RATE: 66 BPM | DIASTOLIC BLOOD PRESSURE: 81 MMHG | SYSTOLIC BLOOD PRESSURE: 118 MMHG

## 2018-04-09 DIAGNOSIS — S46.911A STRAIN OF RIGHT SHOULDER, INITIAL ENCOUNTER: ICD-10-CM

## 2018-04-09 DIAGNOSIS — R52 PAIN: ICD-10-CM

## 2018-04-09 DIAGNOSIS — R52 PAIN: Primary | ICD-10-CM

## 2018-04-09 PROCEDURE — 99243 OFF/OP CNSLTJ NEW/EST LOW 30: CPT | Performed by: ORTHOPAEDIC SURGERY

## 2018-04-09 PROCEDURE — 99212 OFFICE O/P EST SF 10 MIN: CPT | Performed by: ORTHOPAEDIC SURGERY

## 2018-04-09 PROCEDURE — 73030 X-RAY EXAM OF SHOULDER: CPT | Performed by: ORTHOPAEDIC SURGERY

## 2018-04-09 PROCEDURE — 20610 DRAIN/INJ JOINT/BURSA W/O US: CPT | Performed by: ORTHOPAEDIC SURGERY

## 2018-04-09 RX ORDER — MELOXICAM 15 MG/1
15 TABLET ORAL DAILY
Qty: 30 TABLET | Refills: 1 | Status: SHIPPED | OUTPATIENT
Start: 2018-04-09 | End: 2018-05-03

## 2018-04-09 RX ORDER — DEXTROAMPHETAMINE SACCHARATE, AMPHETAMINE ASPARTATE MONOHYDRATE, DEXTROAMPHETAMINE SULFATE AND AMPHETAMINE SULFATE 7.5; 7.5; 7.5; 7.5 MG/1; MG/1; MG/1; MG/1
30 CAPSULE, EXTENDED RELEASE ORAL EVERY MORNING
COMMUNITY
End: 2018-05-03

## 2018-04-09 NOTE — H&P
Chief Complaint: Right shoulder pain    NURSING INTAKE COMMENTS: Patient presents with:  Consult: Was walking her dog and he pulled. Jose Josias a crack to her right shoulder. This happened about 1 week ago. Limited mobility.  Can not lift her arm in front of her 5/5      Biceps 5/5 5/5        Pain         Rotator Cuff resistance none +++       Bicipital Groove None None       AC joint none none        Neurovascular status Intact Intact        Neck ROM WNL WNL   Myelopathic signs none none   Apprehension none none

## 2018-04-09 NOTE — PROGRESS NOTES
Per verbal order from VT, draw up 2ml of Kenalog 10 and 2ml of 1% lidocaine for cortisone injection to right shoulder. Pt given steroid information sheet. VS prior to injection stable. Time out progressed.  Consent signed for right shoulder steroid injectio

## 2018-04-19 ENCOUNTER — TELEPHONE (OUTPATIENT)
Dept: ORTHOPEDICS CLINIC | Facility: CLINIC | Age: 38
End: 2018-04-19

## 2018-04-19 DIAGNOSIS — R52 PAIN: Primary | ICD-10-CM

## 2018-04-19 NOTE — TELEPHONE ENCOUNTER
Pt seen in office with a shoulder injeciton on 4-9-18. Do we wait to see if she has a better effect of the steroid injeciton?   Pt wants MRI of shoulder    Please advise

## 2018-04-19 NOTE — TELEPHONE ENCOUNTER
MRI order generated. Will need authorizatrion  Call to [employer]. No answer. Left voice message. REquesting call back.

## 2018-04-19 NOTE — TELEPHONE ENCOUNTER
Right shoulder pain has gotten a little better but is still hurting a lot and making a loud cracking noise when lifting arm. Would like order for MRI.

## 2018-04-24 ENCOUNTER — TELEPHONE (OUTPATIENT)
Dept: ORTHOPEDICS CLINIC | Facility: CLINIC | Age: 38
End: 2018-04-24

## 2018-04-24 ENCOUNTER — HOSPITAL ENCOUNTER (OUTPATIENT)
Dept: MRI IMAGING | Facility: HOSPITAL | Age: 38
Discharge: HOME | End: 2018-04-24
Attending: ORTHOPAEDIC SURGERY
Payer: COMMERCIAL

## 2018-04-24 DIAGNOSIS — R52 PAIN: ICD-10-CM

## 2018-04-24 PROCEDURE — 73221 MRI JOINT UPR EXTREM W/O DYE: CPT | Performed by: ORTHOPAEDIC SURGERY

## 2018-04-24 NOTE — TELEPHONE ENCOUNTER
Called AIM and s/w Ashli to initiate PA for MRI right shoulder. She states an AIM # cannot be given for this pt and to check with the health plan to see an auth # is needed. Called [employer] and s/w Rohini Saxena to see if pt requires any RQI or PA for MRI.  She stat

## 2018-04-30 ENCOUNTER — OFFICE VISIT (OUTPATIENT)
Dept: ORTHOPEDICS CLINIC | Facility: CLINIC | Age: 38
End: 2018-04-30

## 2018-04-30 DIAGNOSIS — M75.121 COMPLETE TEAR OF RIGHT ROTATOR CUFF: Primary | ICD-10-CM

## 2018-04-30 PROCEDURE — 99214 OFFICE O/P EST MOD 30 MIN: CPT | Performed by: ORTHOPAEDIC SURGERY

## 2018-04-30 PROCEDURE — 99212 OFFICE O/P EST SF 10 MIN: CPT | Performed by: ORTHOPAEDIC SURGERY

## 2018-04-30 NOTE — H&P
Chief Complaint: Right shoulder pain    NURSING INTAKE COMMENTS: Patient presents with:  Shoulder Pain: Right f/u and MRI results - states she still has pain rated as 10/10 at all the time, no numbness or tingling. History of present illness:   This 37 Full-thickness complete and retracted tear of the supraspinatus. Partial thickness articular surface tear of the infraspinatus. Severe subacromial subdeltoid bursitis. Prominent subacromial osteophyte.     Malik Jj was seen today for should

## 2018-05-01 ENCOUNTER — TELEPHONE (OUTPATIENT)
Dept: ORTHOPEDICS CLINIC | Facility: CLINIC | Age: 38
End: 2018-05-01

## 2018-05-01 NOTE — TELEPHONE ENCOUNTER
Call to Joseph. No answer on cell. Message Left REquested call back  Call to home phone - sister answered took message for pt to set up appointment for  Friday.     Requested call back

## 2018-05-02 ENCOUNTER — TELEPHONE (OUTPATIENT)
Dept: ORTHOPEDICS CLINIC | Facility: CLINIC | Age: 38
End: 2018-05-02

## 2018-05-02 NOTE — TELEPHONE ENCOUNTER
Review of coverages  Pt for Surgery with Dr. Isac Mishra  Date 5-9-18  Right rotator cuff repair  [id number]   [id number]  Diagnosis code M75.121    Spoke to [facility] represetative Paul  No prior authorization required  Call reference [phone]

## 2018-05-03 ENCOUNTER — OFFICE VISIT (OUTPATIENT)
Dept: FAMILY MEDICINE CLINIC | Facility: CLINIC | Age: 38
End: 2018-05-03

## 2018-05-03 ENCOUNTER — LAB ENCOUNTER (OUTPATIENT)
Dept: LAB | Age: 38
End: 2018-05-03
Attending: FAMILY MEDICINE
Payer: COMMERCIAL

## 2018-05-03 ENCOUNTER — APPOINTMENT (OUTPATIENT)
Dept: LAB | Age: 38
End: 2018-05-03
Attending: FAMILY MEDICINE
Payer: COMMERCIAL

## 2018-05-03 VITALS
WEIGHT: 107 LBS | BODY MASS INDEX: 19.69 KG/M2 | DIASTOLIC BLOOD PRESSURE: 83 MMHG | HEART RATE: 93 BPM | HEIGHT: 62 IN | SYSTOLIC BLOOD PRESSURE: 122 MMHG | TEMPERATURE: 99 F

## 2018-05-03 DIAGNOSIS — Z01.818 PREOP EXAMINATION: ICD-10-CM

## 2018-05-03 DIAGNOSIS — M75.121 COMPLETE TEAR OF RIGHT ROTATOR CUFF: Primary | ICD-10-CM

## 2018-05-03 DIAGNOSIS — F98.8 ATTENTION DEFICIT DISORDER (ADD) WITHOUT HYPERACTIVITY: ICD-10-CM

## 2018-05-03 DIAGNOSIS — I10 ESSENTIAL HYPERTENSION: ICD-10-CM

## 2018-05-03 DIAGNOSIS — I51.7 ATRIAL ENLARGEMENT, BILATERAL: ICD-10-CM

## 2018-05-03 DIAGNOSIS — J45.909 MILD ASTHMA WITHOUT COMPLICATION, UNSPECIFIED WHETHER PERSISTENT: ICD-10-CM

## 2018-05-03 DIAGNOSIS — R80.9 PROTEINURIA, UNSPECIFIED TYPE: ICD-10-CM

## 2018-05-03 PROCEDURE — 99244 OFF/OP CNSLTJ NEW/EST MOD 40: CPT | Performed by: FAMILY MEDICINE

## 2018-05-03 PROCEDURE — 36415 COLL VENOUS BLD VENIPUNCTURE: CPT

## 2018-05-03 PROCEDURE — 80053 COMPREHEN METABOLIC PANEL: CPT

## 2018-05-03 PROCEDURE — 85730 THROMBOPLASTIN TIME PARTIAL: CPT

## 2018-05-03 PROCEDURE — 99212 OFFICE O/P EST SF 10 MIN: CPT | Performed by: FAMILY MEDICINE

## 2018-05-03 PROCEDURE — 85025 COMPLETE CBC W/AUTO DIFF WBC: CPT

## 2018-05-03 PROCEDURE — 93005 ELECTROCARDIOGRAM TRACING: CPT

## 2018-05-03 PROCEDURE — 85610 PROTHROMBIN TIME: CPT

## 2018-05-03 PROCEDURE — 81025 URINE PREGNANCY TEST: CPT

## 2018-05-03 PROCEDURE — 93010 ELECTROCARDIOGRAM REPORT: CPT | Performed by: FAMILY MEDICINE

## 2018-05-03 PROCEDURE — 81001 URINALYSIS AUTO W/SCOPE: CPT

## 2018-05-03 RX ORDER — DEXTROAMPHETAMINE SACCHARATE, AMPHETAMINE ASPARTATE MONOHYDRATE, DEXTROAMPHETAMINE SULFATE AND AMPHETAMINE SULFATE 7.5; 7.5; 7.5; 7.5 MG/1; MG/1; MG/1; MG/1
30 CAPSULE, EXTENDED RELEASE ORAL DAILY
Qty: 30 CAPSULE | Refills: 0 | Status: SHIPPED | OUTPATIENT
Start: 2018-06-02 | End: 2018-05-03

## 2018-05-03 RX ORDER — DEXTROAMPHETAMINE SACCHARATE, AMPHETAMINE ASPARTATE MONOHYDRATE, DEXTROAMPHETAMINE SULFATE AND AMPHETAMINE SULFATE 7.5; 7.5; 7.5; 7.5 MG/1; MG/1; MG/1; MG/1
30 CAPSULE, EXTENDED RELEASE ORAL DAILY
Qty: 30 CAPSULE | Refills: 0 | Status: SHIPPED | OUTPATIENT
Start: 2018-05-03 | End: 2018-06-02

## 2018-05-03 RX ORDER — DEXTROAMPHETAMINE SACCHARATE, AMPHETAMINE ASPARTATE MONOHYDRATE, DEXTROAMPHETAMINE SULFATE AND AMPHETAMINE SULFATE 7.5; 7.5; 7.5; 7.5 MG/1; MG/1; MG/1; MG/1
30 CAPSULE, EXTENDED RELEASE ORAL DAILY
Qty: 30 CAPSULE | Refills: 0 | Status: SHIPPED | OUTPATIENT
Start: 2018-07-02 | End: 2018-05-03

## 2018-05-03 NOTE — PROGRESS NOTES
Patient ID: Sosa Castorena is a 30year old female. HPI  Patient presents with:  Pre-Op Exam: Shoulder    Patient had seen Dr. Erica Gamino on 4/9/2018. She was walking her dog and he pulled hard and she felt a crack in her right shoulder.   This happe fracture, dislocation, or marrow replacing lesion. JOINT FLUID:    Small joint effusion and synovitis.      SUBACROMIAL AND SUBDELTOID BURSA:     There is fluid within the subacromial subdeltoid bursa consistent with full-thickness rotator cuff tear wit History reviewed. No pertinent surgical history.       Social History  Social History   Marital status: Single  Spouse name: N/A    Years of education: N/A  Number of children: N/A     Occupational History  None on file     Social History Main Topics murmur heard. Pulmonary/Chest: Effort normal and breath sounds normal. No respiratory distress. Abdominal: Soft. Bowel sounds are normal. There is no hepatosplenomegaly. There is no tenderness. Lymphadenopathy:     She has no  cervical adenopathy.    Eliana Hernandez (ADDERALL XR) 30 MG Oral Capsule SR 24 Hr; Take 1 capsule (30 mg total) by mouth daily.  -     Amphetamine-Dextroamphet ER (ADDERALL XR) 30 MG Oral Capsule SR 24 Hr;  Take 1 capsule (30 mg total) by mouth daily.  -     Amphetamine-Dextroamphet ER (ADDERALL

## 2018-05-04 ENCOUNTER — HOSPITAL ENCOUNTER (OUTPATIENT)
Dept: GENERAL RADIOLOGY | Facility: HOSPITAL | Age: 38
Discharge: HOME | End: 2018-05-04
Attending: ORTHOPAEDIC SURGERY
Payer: COMMERCIAL

## 2018-05-04 ENCOUNTER — OFFICE VISIT (OUTPATIENT)
Dept: ORTHOPEDICS CLINIC | Facility: CLINIC | Age: 38
End: 2018-05-04

## 2018-05-04 DIAGNOSIS — M75.121 COMPLETE TEAR OF RIGHT ROTATOR CUFF: Primary | ICD-10-CM

## 2018-05-04 DIAGNOSIS — M54.2 NECK PAIN: ICD-10-CM

## 2018-05-04 DIAGNOSIS — S16.1XXA NECK STRAIN, INITIAL ENCOUNTER: ICD-10-CM

## 2018-05-04 PROCEDURE — 99214 OFFICE O/P EST MOD 30 MIN: CPT | Performed by: ORTHOPAEDIC SURGERY

## 2018-05-04 PROCEDURE — 72040 X-RAY EXAM NECK SPINE 2-3 VW: CPT | Performed by: ORTHOPAEDIC SURGERY

## 2018-05-04 PROCEDURE — 99212 OFFICE O/P EST SF 10 MIN: CPT | Performed by: ORTHOPAEDIC SURGERY

## 2018-05-04 NOTE — H&P
5/4/2018  Richelle Walsh  156/1980  30year old   female  Gary Mccann MD    HPI:   Patient presents with:  Pre-Op: Right shoulder - has sx scheduled for 5/9/18 - still has a lot of pain rated as 8-9/10 at all the time.     This is a pleasant 37-y HISTORY:  Past Medical History:   Diagnosis Date   • Asthma    • Attention deficit disorder    • Extrinsic asthma, unspecified    • Kidney disease    • Unspecified essential hypertension       Past Surgical History:  No date: OTHER      Comment: KIDNEY B subdeltoid bursitis. There is a prominent subacromial osteophyte.     ASSESSMENT AND PLAN:   Complete tear of right rotator cuff  (primary encounter diagnosis)  Neck strain, initial encounter  Neck pain    The risks, indications, benefits, and procedures o

## 2018-05-05 ENCOUNTER — TELEPHONE (OUTPATIENT)
Dept: ORTHOPEDICS CLINIC | Facility: CLINIC | Age: 38
End: 2018-05-05

## 2018-05-07 ENCOUNTER — HOSPITAL ENCOUNTER (OUTPATIENT)
Dept: CV DIAGNOSTICS | Age: 38
Discharge: HOME | End: 2018-05-07
Attending: FAMILY MEDICINE
Payer: COMMERCIAL

## 2018-05-07 ENCOUNTER — TELEPHONE (OUTPATIENT)
Dept: FAMILY MEDICINE CLINIC | Facility: CLINIC | Age: 38
End: 2018-05-07

## 2018-05-07 DIAGNOSIS — I51.7 ATRIAL ENLARGEMENT, BILATERAL: ICD-10-CM

## 2018-05-07 DIAGNOSIS — R80.9 PROTEINURIA, UNSPECIFIED TYPE: ICD-10-CM

## 2018-05-07 DIAGNOSIS — I10 ESSENTIAL HYPERTENSION: ICD-10-CM

## 2018-05-07 PROCEDURE — 93306 TTE W/DOPPLER COMPLETE: CPT | Performed by: FAMILY MEDICINE

## 2018-05-08 ENCOUNTER — TELEPHONE (OUTPATIENT)
Dept: FAMILY MEDICINE CLINIC | Facility: CLINIC | Age: 38
End: 2018-05-08

## 2018-05-08 NOTE — TELEPHONE ENCOUNTER
Good morning Dr. Josiah London,    [facility] for pt's mother pending in [facility]. She is requesting a continuous leave from 5/9/18 - 5/18/18 to be with daughter on day of surgery and to help care for her afterwards. Do you approve? Please advise.     Thank you,  [facility]

## 2018-05-08 NOTE — TELEPHONE ENCOUNTER
Santiago Devine,    My preoperative note is done and her echocardiogram of her heart is normal along with her labs. Patient is medically optimized for her rotator cuff surgery.     Damaso Zhang

## 2018-05-08 NOTE — TELEPHONE ENCOUNTER
Dr. Rodger Flor,    [id number] [facility]. She is requesting a continuous leave from 5/9/18 - 5/18/18 to be with daughter on day of surgery and to help care for her afterwards. Do you approve? Please advise.     Thank you,  [facility]

## 2018-05-09 ENCOUNTER — ANESTHESIA (OUTPATIENT)
Dept: SURGERY | Facility: HOSPITAL | Age: 38
End: 2018-05-09
Payer: COMMERCIAL

## 2018-05-09 ENCOUNTER — SURGERY (OUTPATIENT)
Age: 38
End: 2018-05-09

## 2018-05-09 ENCOUNTER — HOSPITAL ENCOUNTER (OUTPATIENT)
Facility: HOSPITAL | Age: 38
Setting detail: HOSPITAL OUTPATIENT SURGERY
Discharge: HOME | End: 2018-05-09
Attending: ORTHOPAEDIC SURGERY | Admitting: ORTHOPAEDIC SURGERY
Payer: COMMERCIAL

## 2018-05-09 ENCOUNTER — ANESTHESIA EVENT (OUTPATIENT)
Dept: SURGERY | Facility: HOSPITAL | Age: 38
End: 2018-05-09
Payer: COMMERCIAL

## 2018-05-09 VITALS
SYSTOLIC BLOOD PRESSURE: 158 MMHG | TEMPERATURE: 98 F | HEART RATE: 61 BPM | WEIGHT: 104.13 LBS | RESPIRATION RATE: 15 BRPM | DIASTOLIC BLOOD PRESSURE: 94 MMHG | OXYGEN SATURATION: 97 % | BODY MASS INDEX: 19.16 KG/M2 | HEIGHT: 62 IN

## 2018-05-09 DIAGNOSIS — M75.121 COMPLETE TEAR OF RIGHT ROTATOR CUFF: ICD-10-CM

## 2018-05-09 LAB — B-HCG UR QL: NEGATIVE

## 2018-05-09 PROCEDURE — 0RNJ4ZZ RELEASE RIGHT SHOULDER JOINT, PERCUTANEOUS ENDOSCOPIC APPROACH: ICD-10-PCS | Performed by: ORTHOPAEDIC SURGERY

## 2018-05-09 PROCEDURE — 0LQ14ZZ REPAIR RIGHT SHOULDER TENDON, PERCUTANEOUS ENDOSCOPIC APPROACH: ICD-10-PCS | Performed by: ORTHOPAEDIC SURGERY

## 2018-05-09 PROCEDURE — 99152 MOD SED SAME PHYS/QHP 5/>YRS: CPT | Performed by: ORTHOPAEDIC SURGERY

## 2018-05-09 PROCEDURE — 64415 NJX AA&/STRD BRCH PLXS IMG: CPT | Performed by: ORTHOPAEDIC SURGERY

## 2018-05-09 PROCEDURE — 76942 ECHO GUIDE FOR BIOPSY: CPT | Performed by: ORTHOPAEDIC SURGERY

## 2018-05-09 PROCEDURE — 3E0T3BZ INTRODUCTION OF ANESTHETIC AGENT INTO PERIPHERAL NERVES AND PLEXI, PERCUTANEOUS APPROACH: ICD-10-PCS | Performed by: ANESTHESIOLOGY

## 2018-05-09 PROCEDURE — 81025 URINE PREGNANCY TEST: CPT

## 2018-05-09 DEVICE — ANCHOR SWIVELOCK AR-2324BCC: Type: IMPLANTABLE DEVICE | Site: SHOULDER | Status: FUNCTIONAL

## 2018-05-09 DEVICE — ANCHOR SUT 5.5MM 2 FT CRKSCR 2: Type: IMPLANTABLE DEVICE | Site: SHOULDER | Status: FUNCTIONAL

## 2018-05-09 RX ORDER — HYDROMORPHONE HYDROCHLORIDE 1 MG/ML
0.6 INJECTION, SOLUTION INTRAMUSCULAR; INTRAVENOUS; SUBCUTANEOUS EVERY 5 MIN PRN
Status: DISCONTINUED | OUTPATIENT
Start: 2018-05-09 | End: 2018-05-09

## 2018-05-09 RX ORDER — ONDANSETRON 2 MG/ML
4 INJECTION INTRAMUSCULAR; INTRAVENOUS ONCE AS NEEDED
Status: DISCONTINUED | OUTPATIENT
Start: 2018-05-09 | End: 2018-05-09

## 2018-05-09 RX ORDER — HYDROCODONE BITARTRATE AND ACETAMINOPHEN 5; 325 MG/1; MG/1
2 TABLET ORAL AS NEEDED
Status: COMPLETED | OUTPATIENT
Start: 2018-05-09 | End: 2018-05-09

## 2018-05-09 RX ORDER — ACETAMINOPHEN 500 MG
1000 TABLET ORAL ONCE
Status: COMPLETED | OUTPATIENT
Start: 2018-05-09 | End: 2018-05-09

## 2018-05-09 RX ORDER — CEFAZOLIN SODIUM/WATER 2 G/20 ML
SYRINGE (ML) INTRAVENOUS AS NEEDED
Status: DISCONTINUED | OUTPATIENT
Start: 2018-05-09 | End: 2018-05-09 | Stop reason: SURG

## 2018-05-09 RX ORDER — SODIUM CHLORIDE, SODIUM LACTATE, POTASSIUM CHLORIDE, CALCIUM CHLORIDE 600; 310; 30; 20 MG/100ML; MG/100ML; MG/100ML; MG/100ML
INJECTION, SOLUTION INTRAVENOUS CONTINUOUS
Status: DISCONTINUED | OUTPATIENT
Start: 2018-05-09 | End: 2018-05-09

## 2018-05-09 RX ORDER — PHENYLEPHRINE HCL 10 MG/ML
VIAL (ML) INJECTION AS NEEDED
Status: DISCONTINUED | OUTPATIENT
Start: 2018-05-09 | End: 2018-05-09 | Stop reason: SURG

## 2018-05-09 RX ORDER — ROCURONIUM BROMIDE 10 MG/ML
INJECTION, SOLUTION INTRAVENOUS AS NEEDED
Status: DISCONTINUED | OUTPATIENT
Start: 2018-05-09 | End: 2018-05-09 | Stop reason: SURG

## 2018-05-09 RX ORDER — FAMOTIDINE 20 MG/1
20 TABLET ORAL ONCE
Status: DISCONTINUED | OUTPATIENT
Start: 2018-05-09 | End: 2018-05-09 | Stop reason: HOSPADM

## 2018-05-09 RX ORDER — HALOPERIDOL 5 MG/ML
0.25 INJECTION INTRAMUSCULAR ONCE AS NEEDED
Status: DISCONTINUED | OUTPATIENT
Start: 2018-05-09 | End: 2018-05-09

## 2018-05-09 RX ORDER — METOCLOPRAMIDE 10 MG/1
10 TABLET ORAL ONCE
Status: DISCONTINUED | OUTPATIENT
Start: 2018-05-09 | End: 2018-05-09 | Stop reason: HOSPADM

## 2018-05-09 RX ORDER — LIDOCAINE HYDROCHLORIDE 10 MG/ML
INJECTION, SOLUTION EPIDURAL; INFILTRATION; INTRACAUDAL; PERINEURAL AS NEEDED
Status: DISCONTINUED | OUTPATIENT
Start: 2018-05-09 | End: 2018-05-09 | Stop reason: SURG

## 2018-05-09 RX ORDER — HYDROMORPHONE HYDROCHLORIDE 1 MG/ML
0.2 INJECTION, SOLUTION INTRAMUSCULAR; INTRAVENOUS; SUBCUTANEOUS EVERY 5 MIN PRN
Status: DISCONTINUED | OUTPATIENT
Start: 2018-05-09 | End: 2018-05-09

## 2018-05-09 RX ORDER — HYDROCODONE BITARTRATE AND ACETAMINOPHEN 5; 325 MG/1; MG/1
1 TABLET ORAL AS NEEDED
Status: COMPLETED | OUTPATIENT
Start: 2018-05-09 | End: 2018-05-09

## 2018-05-09 RX ORDER — HYDROCODONE BITARTRATE AND ACETAMINOPHEN 10; 325 MG/1; MG/1
1-2 TABLET ORAL EVERY 6 HOURS PRN
Qty: 40 TABLET | Refills: 0 | Status: SHIPPED | OUTPATIENT
Start: 2018-05-09 | End: 2018-07-31 | Stop reason: ALTCHOICE

## 2018-05-09 RX ORDER — MIDAZOLAM HYDROCHLORIDE 1 MG/ML
INJECTION INTRAMUSCULAR; INTRAVENOUS AS NEEDED
Status: DISCONTINUED | OUTPATIENT
Start: 2018-05-09 | End: 2018-05-09 | Stop reason: SURG

## 2018-05-09 RX ORDER — HYDROMORPHONE HYDROCHLORIDE 1 MG/ML
0.4 INJECTION, SOLUTION INTRAMUSCULAR; INTRAVENOUS; SUBCUTANEOUS EVERY 5 MIN PRN
Status: DISCONTINUED | OUTPATIENT
Start: 2018-05-09 | End: 2018-05-09

## 2018-05-09 RX ORDER — CEFAZOLIN SODIUM/WATER 2 G/20 ML
2 SYRINGE (ML) INTRAVENOUS ONCE
Status: DISCONTINUED | OUTPATIENT
Start: 2018-05-09 | End: 2018-05-09 | Stop reason: HOSPADM

## 2018-05-09 RX ORDER — DEXAMETHASONE SODIUM PHOSPHATE 10 MG/ML
INJECTION, SOLUTION INTRAMUSCULAR; INTRAVENOUS AS NEEDED
Status: DISCONTINUED | OUTPATIENT
Start: 2018-05-09 | End: 2018-05-09 | Stop reason: SURG

## 2018-05-09 RX ORDER — DEXAMETHASONE SODIUM PHOSPHATE 4 MG/ML
VIAL (ML) INJECTION AS NEEDED
Status: DISCONTINUED | OUTPATIENT
Start: 2018-05-09 | End: 2018-05-09 | Stop reason: SURG

## 2018-05-09 RX ORDER — ONDANSETRON 2 MG/ML
4 INJECTION INTRAMUSCULAR; INTRAVENOUS EVERY 6 HOURS PRN
Status: DISCONTINUED | OUTPATIENT
Start: 2018-05-09 | End: 2018-05-09

## 2018-05-09 RX ORDER — ROPIVACAINE HYDROCHLORIDE 5 MG/ML
INJECTION, SOLUTION EPIDURAL; INFILTRATION; PERINEURAL AS NEEDED
Status: DISCONTINUED | OUTPATIENT
Start: 2018-05-09 | End: 2018-05-09 | Stop reason: SURG

## 2018-05-09 RX ORDER — NALOXONE HYDROCHLORIDE 0.4 MG/ML
80 INJECTION, SOLUTION INTRAMUSCULAR; INTRAVENOUS; SUBCUTANEOUS AS NEEDED
Status: DISCONTINUED | OUTPATIENT
Start: 2018-05-09 | End: 2018-05-09

## 2018-05-09 RX ADMIN — PHENYLEPHRINE HCL 100 MCG: 10 MG/ML VIAL (ML) INJECTION at 11:59:00

## 2018-05-09 RX ADMIN — DEXAMETHASONE SODIUM PHOSPHATE 4 MG: 4 MG/ML VIAL (ML) INJECTION at 11:09:00

## 2018-05-09 RX ADMIN — SODIUM CHLORIDE, SODIUM LACTATE, POTASSIUM CHLORIDE, CALCIUM CHLORIDE: 600; 310; 30; 20 INJECTION, SOLUTION INTRAVENOUS at 10:52:00

## 2018-05-09 RX ADMIN — SODIUM CHLORIDE, SODIUM LACTATE, POTASSIUM CHLORIDE, CALCIUM CHLORIDE: 600; 310; 30; 20 INJECTION, SOLUTION INTRAVENOUS at 12:55:00

## 2018-05-09 RX ADMIN — LIDOCAINE HYDROCHLORIDE 50 MG: 10 INJECTION, SOLUTION EPIDURAL; INFILTRATION; INTRACAUDAL; PERINEURAL at 10:52:00

## 2018-05-09 RX ADMIN — PHENYLEPHRINE HCL 100 MCG: 10 MG/ML VIAL (ML) INJECTION at 11:36:00

## 2018-05-09 RX ADMIN — CEFAZOLIN SODIUM/WATER 2 G: 2 G/20 ML SYRINGE (ML) INTRAVENOUS at 11:09:00

## 2018-05-09 RX ADMIN — ROCURONIUM BROMIDE 40 MG: 10 INJECTION, SOLUTION INTRAVENOUS at 10:52:00

## 2018-05-09 RX ADMIN — MIDAZOLAM HYDROCHLORIDE 2 MG: 1 INJECTION INTRAMUSCULAR; INTRAVENOUS at 09:40:00

## 2018-05-09 RX ADMIN — DEXAMETHASONE SODIUM PHOSPHATE 4 MG: 10 INJECTION, SOLUTION INTRAMUSCULAR; INTRAVENOUS at 09:44:00

## 2018-05-09 RX ADMIN — ROPIVACAINE HYDROCHLORIDE 30 ML: 5 INJECTION, SOLUTION EPIDURAL; INFILTRATION; PERINEURAL at 09:44:00

## 2018-05-09 RX ADMIN — LIDOCAINE HYDROCHLORIDE 3 ML: 10 INJECTION, SOLUTION EPIDURAL; INFILTRATION; INTRACAUDAL; PERINEURAL at 09:40:00

## 2018-05-09 NOTE — ADDENDUM NOTE
Addendum  created 05/09/18 1632 by Lane Joshua MD    Anesthesia Event edited, Anesthesia Intra Blocks edited, Anesthesia Intra Meds edited, Child order released for a procedure order, Sign clinical note

## 2018-05-09 NOTE — OPERATIVE REPORT
[facility]    PATIENT'S NAME: Darlene Sierra   ATTENDING PHYSICIAN: Ad Plata MD   OPERATING PHYSICIAN: Ad Plata MD   PATIENT ACCOUNT#:   [id number]    LOCATION:  [facility] 10  MEDICAL RECORD #:   [id number] The patient was given an interscalene block prior to going back to the operating room. After she was intubated, the patient was placed into a modified beach chair position, with all bony prominences well padded.   The patient's right upper extremity was p placed into a 4.75 mm SwiveLock anchor on the lateral aspect of the shoulder. Once all limbs were incorporated into the anchor, the rotator cuff was appropriately reduced to the greater tuberosity.   At this time, all arthroscopic instrumentation was taken

## 2018-05-09 NOTE — H&P (VIEW-ONLY)
5/4/2018  Abbie Larsen  156/1980  30year old   female  Moo Lovell MD    HPI:   Patient presents with:  Pre-Op: Right shoulder - has sx scheduled for 5/9/18 - still has a lot of pain rated as 8-9/10 at all the time.     This is a pleasant 37-y HISTORY:  Past Medical History:   Diagnosis Date   • Asthma    • Attention deficit disorder    • Extrinsic asthma, unspecified    • Kidney disease    • Unspecified essential hypertension       Past Surgical History:  No date: OTHER      Comment: KIDNEY B subdeltoid bursitis. There is a prominent subacromial osteophyte.     ASSESSMENT AND PLAN:   Complete tear of right rotator cuff  (primary encounter diagnosis)  Neck strain, initial encounter  Neck pain    The risks, indications, benefits, and procedures o

## 2018-05-09 NOTE — INTERVAL H&P NOTE
Pre-op Diagnosis: Complete tear of right rotator cuff [M75.121]    The above referenced H&P was reviewed by Ted Pimentel MD on 5/9/2018, the patient was examined and no significant changes have occurred in the patient's condition since the H&P was p

## 2018-05-09 NOTE — BRIEF OP NOTE
Pre-Operative Diagnosis: Complete tear of right rotator cuff [M75.121]     Post-Operative Diagnosis: Complete tear of right rotator cuff [M75.121]      Procedure Performed:   Procedure(s):  RIGHT SHOULDER ARTHROSCOPIC ROTATOR CUFF REPAIR, SUBACROMIAL DECOM

## 2018-05-09 NOTE — ANESTHESIA POSTPROCEDURE EVALUATION
Patient: Samuel Walton    Procedure Summary     Date:  05/09/18 Room / Location:  [address] [place] [address] MAIN OR 02 / [address] [place] [address] OR    Anesthesia Start:  6133 Anesthesia Stop:      Procedure:  SHOULDER ARTHROSCOPY ROTATOR CUFF REPAIR (Right ) Diagnosis:       Complete tear

## 2018-05-09 NOTE — TELEPHONE ENCOUNTER
Dr. Tayler Sánchez    Please sign off on 2 forms:  -Highlight the patient and hit \"Chart\" button. -In Chart Review, w/in the Encounter tab - click 1 time on the Telephone call encounter for 5/5/18.  Scroll down the telephone encounter.  -Click \"scan on\" blue

## 2018-05-09 NOTE — ANESTHESIA PREPROCEDURE EVALUATION
Anesthesia PreOp Note    HPI:     Jazzy Fierro is a 30year old female who presents for preoperative consultation requested by: Magnolia Tello MD    Date of Surgery: 5/9/2018    Procedure(s):  SHOULDER ARTHROSCOPY ROTATOR CUFF REPAIR  Indication: INTO THE LUNGS EVERY 4 (FOUR) HOURS AS NEEDED   FOR WHEEZING OR SHORTNESS OF BREATH.  Disp: 8.5 g Rfl: 2 More than a month at Unknown time       Current Facility-Administered Medications Ordered in Epic:  lactated ringers infusion  Intravenous Continuous Da index is 19.04 kg/m². height is 1.575 m (5' 2\") and weight is 47.2 kg (104 lb 1.6 oz). Her oral temperature is 99 °F (37.2 °C). Her blood pressure is 146/97 and her pulse is 59. Her respiration is 18 and oxygen saturation is 100%.     05/03/18  1435 05/0

## 2018-05-09 NOTE — ANESTHESIA PROCEDURE NOTES
Peripheral Block    Anesthesiologist:  Indra Watkins  Performed by:   Anesthesiologist  Patient Location:  PACU  Start Time:  5/9/2018 9:40 AM  End Time:  5/9/2018 9:48 AM  Site Identification: ultrasound guided, real time ultrasound guided, nerve sti

## 2018-05-11 ENCOUNTER — TELEPHONE (OUTPATIENT)
Dept: ORTHOPEDICS CLINIC | Facility: CLINIC | Age: 38
End: 2018-05-11

## 2018-05-11 ENCOUNTER — OFFICE VISIT (OUTPATIENT)
Dept: ORTHOPEDICS CLINIC | Facility: CLINIC | Age: 38
End: 2018-05-11

## 2018-05-11 DIAGNOSIS — M75.121 COMPLETE TEAR OF RIGHT ROTATOR CUFF: Primary | ICD-10-CM

## 2018-05-11 PROCEDURE — 99212 OFFICE O/P EST SF 10 MIN: CPT | Performed by: ORTHOPAEDIC SURGERY

## 2018-05-11 PROCEDURE — 99024 POSTOP FOLLOW-UP VISIT: CPT | Performed by: ORTHOPAEDIC SURGERY

## 2018-05-11 NOTE — PROGRESS NOTES
This is a pleasant 29-year-old female that is 2 days status post right shoulder arthroscopy, subacromial decompression, and rotator cuff repair. The patient has had no fevers, chills, chest pain, shortness of breath.   The patient comes in today for evalua

## 2018-05-11 NOTE — TELEPHONE ENCOUNTER
Call to Jake . Discussed her pain medication. Discussed that Dr. Yury Blank does not want her to take any ibuprofen    Jake states she has not taken any ibuprofen  She is only taking norco for pain.     FARHAN

## 2018-05-14 ENCOUNTER — TELEPHONE (OUTPATIENT)
Dept: ORTHOPEDICS CLINIC | Facility: CLINIC | Age: 38
End: 2018-05-14

## 2018-05-14 RX ORDER — TRAMADOL HYDROCHLORIDE 50 MG/1
50 TABLET ORAL EVERY 6 HOURS PRN
Qty: 40 TABLET | Refills: 0
Start: 2018-05-14 | End: 2018-07-31 | Stop reason: ALTCHOICE

## 2018-05-14 NOTE — TELEPHONE ENCOUNTER
Patient has cervical strain that was diagnosed pre-operatively. Not able to take Ibuprofen due to kidney issues. Will switch patient to Tramadol and Tylenol. Ok to call in script for Tramadol 50 mg PO q 6 hrs PRN.  Patient may also take Tyenol in between Tr

## 2018-05-14 NOTE — TELEPHONE ENCOUNTER
Spoke to pt and informed her of [facility] orders. Instructed pt to take the medications exactly as directed and not to exceed 3000 mg of Tylenol per day. Verified pharmacy with pt. Pt verbalized understanding.

## 2018-05-14 NOTE — TELEPHONE ENCOUNTER
COSTA on pt's mobile #   Spoke to Roderick, mother of pt, and informed her that I need to speak directly with pt to discuss her pain levels and such. Mother to have pt call the office to discuss. -- Please send call to me when pt calls back. Thank you!

## 2018-05-14 NOTE — TELEPHONE ENCOUNTER
Spoke to pt and she is requesting refill of Norco 10/325. Pt was taking 2 Norco 10/325 every 6 hours. States she is out of [place] as of yesterday. Taking 200 mg of ibuprofen today but states she is really not supposed to due to kidney problems.    Rates pa

## 2018-05-14 NOTE — TELEPHONE ENCOUNTER
Current Outpatient Prescriptions:  HYDROcodone-acetaminophen (NORCO)  MG Oral Tab Take 1-2 tablets by mouth every 6 (six) hours as needed for Pain.  Disp: 40 tablet Rfl: 0       Patient requesting refill for [place] , Patient states she is in pain  Pl

## 2018-06-01 ENCOUNTER — OFFICE VISIT (OUTPATIENT)
Dept: ORTHOPEDICS CLINIC | Facility: CLINIC | Age: 38
End: 2018-06-01

## 2018-06-01 DIAGNOSIS — S16.1XXA NECK STRAIN, INITIAL ENCOUNTER: ICD-10-CM

## 2018-06-01 DIAGNOSIS — M75.121 COMPLETE TEAR OF RIGHT ROTATOR CUFF: Primary | ICD-10-CM

## 2018-06-01 PROCEDURE — 99024 POSTOP FOLLOW-UP VISIT: CPT | Performed by: ORTHOPAEDIC SURGERY

## 2018-06-01 PROCEDURE — 99212 OFFICE O/P EST SF 10 MIN: CPT | Performed by: ORTHOPAEDIC SURGERY

## 2018-06-01 NOTE — PROGRESS NOTES
This is a pleasant 19-year-old female that is 3 weeks status post right shoulder arthroscopy, subacromial decompression, and rotator cuff repair. She has had no chest pain or shortness of breath. She comes in today for repeat evaluation.   The patient is

## 2018-06-29 ENCOUNTER — OFFICE VISIT (OUTPATIENT)
Dept: ORTHOPEDICS CLINIC | Facility: CLINIC | Age: 38
End: 2018-06-29

## 2018-06-29 DIAGNOSIS — M75.121 COMPLETE TEAR OF RIGHT ROTATOR CUFF: Primary | ICD-10-CM

## 2018-06-29 PROCEDURE — 99024 POSTOP FOLLOW-UP VISIT: CPT | Performed by: ORTHOPAEDIC SURGERY

## 2018-06-29 PROCEDURE — 99212 OFFICE O/P EST SF 10 MIN: CPT | Performed by: ORTHOPAEDIC SURGERY

## 2018-06-29 RX ORDER — DEXTROAMPHETAMINE SACCHARATE, AMPHETAMINE ASPARTATE MONOHYDRATE, DEXTROAMPHETAMINE SULFATE AND AMPHETAMINE SULFATE 7.5; 7.5; 7.5; 7.5 MG/1; MG/1; MG/1; MG/1
CAPSULE, EXTENDED RELEASE ORAL
Refills: 0 | COMMUNITY
Start: 2018-06-12 | End: 2019-03-04

## 2018-06-29 NOTE — PROGRESS NOTES
This is a 72-year-old female that is 7 weeks status post right shoulder arthroscopy, subacromial decompression, and rotator cuff repair. She has had no chest pain shortness of breath.   She has been participating in physical therapy and comes in today for

## 2018-07-31 ENCOUNTER — OFFICE VISIT (OUTPATIENT)
Dept: ORTHOPEDICS CLINIC | Facility: CLINIC | Age: 38
End: 2018-07-31
Payer: COMMERCIAL

## 2018-07-31 DIAGNOSIS — M75.121 COMPLETE TEAR OF RIGHT ROTATOR CUFF: Primary | ICD-10-CM

## 2018-07-31 PROCEDURE — 99024 POSTOP FOLLOW-UP VISIT: CPT | Performed by: ORTHOPAEDIC SURGERY

## 2018-07-31 PROCEDURE — 99212 OFFICE O/P EST SF 10 MIN: CPT | Performed by: ORTHOPAEDIC SURGERY

## 2018-07-31 NOTE — PROGRESS NOTES
This is a pleasant 82-year-old female that is nearly 12 weeks status post right shoulder arthroscopy, subacromial decompression, rotator cuff repair. She had no chest pain or shortness of breath. She comes in today for repeat evaluation.   She has been pe

## 2018-08-07 RX ORDER — DEXTROAMPHETAMINE SACCHARATE, AMPHETAMINE ASPARTATE MONOHYDRATE, DEXTROAMPHETAMINE SULFATE AND AMPHETAMINE SULFATE 7.5; 7.5; 7.5; 7.5 MG/1; MG/1; MG/1; MG/1
30 CAPSULE, EXTENDED RELEASE ORAL DAILY
Qty: 30 CAPSULE | Refills: 0 | Status: SHIPPED | OUTPATIENT
Start: 2018-09-06 | End: 2018-10-06

## 2018-08-07 RX ORDER — DEXTROAMPHETAMINE SACCHARATE, AMPHETAMINE ASPARTATE MONOHYDRATE, DEXTROAMPHETAMINE SULFATE AND AMPHETAMINE SULFATE 7.5; 7.5; 7.5; 7.5 MG/1; MG/1; MG/1; MG/1
30 CAPSULE, EXTENDED RELEASE ORAL DAILY
Qty: 30 CAPSULE | Refills: 0 | Status: SHIPPED | OUTPATIENT
Start: 2018-10-06 | End: 2018-11-05

## 2018-08-07 RX ORDER — DEXTROAMPHETAMINE SACCHARATE, AMPHETAMINE ASPARTATE MONOHYDRATE, DEXTROAMPHETAMINE SULFATE AND AMPHETAMINE SULFATE 7.5; 7.5; 7.5; 7.5 MG/1; MG/1; MG/1; MG/1
30 CAPSULE, EXTENDED RELEASE ORAL DAILY
Qty: 30 CAPSULE | Refills: 0 | Status: SHIPPED | OUTPATIENT
Start: 2018-08-07 | End: 2018-09-06

## 2018-09-11 ENCOUNTER — OFFICE VISIT (OUTPATIENT)
Dept: ORTHOPEDICS CLINIC | Facility: CLINIC | Age: 38
End: 2018-09-11
Payer: COMMERCIAL

## 2018-09-11 DIAGNOSIS — M75.121 COMPLETE TEAR OF RIGHT ROTATOR CUFF: Primary | ICD-10-CM

## 2018-09-11 PROCEDURE — 99212 OFFICE O/P EST SF 10 MIN: CPT | Performed by: ORTHOPAEDIC SURGERY

## 2018-09-11 PROCEDURE — 99213 OFFICE O/P EST LOW 20 MIN: CPT | Performed by: ORTHOPAEDIC SURGERY

## 2018-09-11 NOTE — PROGRESS NOTES
This is a pleasant 72-year-old female that is 4.5 months status post right shoulder arthroscopy, subacromial decompression, rotator cuff repair. She had no chest pain or shortness of breath.   She has been performing physical therapy and returns today for

## 2018-10-08 RX ORDER — LOSARTAN POTASSIUM 50 MG/1
50 TABLET ORAL DAILY
Qty: 90 TABLET | Refills: 0 | Status: SHIPPED | OUTPATIENT
Start: 2018-10-08 | End: 2019-02-07

## 2018-10-08 NOTE — TELEPHONE ENCOUNTER
LOV 3/26/18. Losartan is noted as starting. F/U in 6 months (9/2018) No appt has been scheduled. Refill pended and routed to Dr. Royce Marin.

## 2018-10-11 NOTE — TELEPHONE ENCOUNTER
Sent to provider, no protocol    Requested Prescriptions     Pending Prescriptions Disp Refills   • TRIAMCINOLONE ACETONIDE 0.1 % External Ointment [Pharmacy Med Name: triamcinolone acetonide 0.1 % External Ointment] 1 Tube 2     Sig: Apply 1 Application t

## 2018-11-06 NOTE — TELEPHONE ENCOUNTER
Requested Prescriptions     Pending Prescriptions Disp Refills   • Amphetamine-Dextroamphet ER (ADDERALL XR) 30 MG Oral Capsule SR 24 Hr 30 capsule 0     Sig: Take 1 capsule (30 mg total) by mouth daily.    • Amphetamine-Dextroamphet ER (ADDERALL XR) 30 MG

## 2018-11-08 RX ORDER — DEXTROAMPHETAMINE SACCHARATE, AMPHETAMINE ASPARTATE MONOHYDRATE, DEXTROAMPHETAMINE SULFATE AND AMPHETAMINE SULFATE 7.5; 7.5; 7.5; 7.5 MG/1; MG/1; MG/1; MG/1
30 CAPSULE, EXTENDED RELEASE ORAL DAILY
Qty: 30 CAPSULE | Refills: 0 | Status: SHIPPED | OUTPATIENT
Start: 2019-01-05 | End: 2019-02-04

## 2018-11-08 RX ORDER — DEXTROAMPHETAMINE SACCHARATE, AMPHETAMINE ASPARTATE MONOHYDRATE, DEXTROAMPHETAMINE SULFATE AND AMPHETAMINE SULFATE 7.5; 7.5; 7.5; 7.5 MG/1; MG/1; MG/1; MG/1
30 CAPSULE, EXTENDED RELEASE ORAL DAILY
Qty: 30 CAPSULE | Refills: 0 | Status: SHIPPED | OUTPATIENT
Start: 2018-12-06 | End: 2019-01-05

## 2018-11-08 RX ORDER — DEXTROAMPHETAMINE SACCHARATE, AMPHETAMINE ASPARTATE MONOHYDRATE, DEXTROAMPHETAMINE SULFATE AND AMPHETAMINE SULFATE 7.5; 7.5; 7.5; 7.5 MG/1; MG/1; MG/1; MG/1
30 CAPSULE, EXTENDED RELEASE ORAL DAILY
Qty: 30 CAPSULE | Refills: 0 | Status: SHIPPED | OUTPATIENT
Start: 2018-11-08 | End: 2018-12-08

## 2019-01-21 ENCOUNTER — IMMUNIZATION (OUTPATIENT)
Dept: FAMILY MEDICINE CLINIC | Facility: CLINIC | Age: 39
End: 2019-01-21
Payer: COMMERCIAL

## 2019-01-21 DIAGNOSIS — Z23 NEED FOR VACCINATION: ICD-10-CM

## 2019-01-21 PROCEDURE — 90471 IMMUNIZATION ADMIN: CPT | Performed by: FAMILY MEDICINE

## 2019-01-21 PROCEDURE — 90686 IIV4 VACC NO PRSV 0.5 ML IM: CPT | Performed by: FAMILY MEDICINE

## 2019-02-07 RX ORDER — DEXTROAMPHETAMINE SACCHARATE, AMPHETAMINE ASPARTATE MONOHYDRATE, DEXTROAMPHETAMINE SULFATE AND AMPHETAMINE SULFATE 7.5; 7.5; 7.5; 7.5 MG/1; MG/1; MG/1; MG/1
30 CAPSULE, EXTENDED RELEASE ORAL DAILY
Qty: 30 CAPSULE | Refills: 0 | Status: SHIPPED | OUTPATIENT
Start: 2019-04-08 | End: 2019-05-08

## 2019-02-07 RX ORDER — DEXTROAMPHETAMINE SACCHARATE, AMPHETAMINE ASPARTATE MONOHYDRATE, DEXTROAMPHETAMINE SULFATE AND AMPHETAMINE SULFATE 7.5; 7.5; 7.5; 7.5 MG/1; MG/1; MG/1; MG/1
30 CAPSULE, EXTENDED RELEASE ORAL EVERY MORNING
Qty: 30 CAPSULE | Refills: 0 | Status: CANCELLED | OUTPATIENT
Start: 2019-02-07

## 2019-02-07 RX ORDER — LOSARTAN POTASSIUM 50 MG/1
50 TABLET ORAL DAILY
Qty: 90 TABLET | Refills: 0 | Status: SHIPPED | OUTPATIENT
Start: 2019-02-07 | End: 2019-06-10

## 2019-02-07 RX ORDER — DEXTROAMPHETAMINE SACCHARATE, AMPHETAMINE ASPARTATE MONOHYDRATE, DEXTROAMPHETAMINE SULFATE AND AMPHETAMINE SULFATE 7.5; 7.5; 7.5; 7.5 MG/1; MG/1; MG/1; MG/1
30 CAPSULE, EXTENDED RELEASE ORAL DAILY
Qty: 30 CAPSULE | Refills: 0 | Status: SHIPPED | OUTPATIENT
Start: 2019-03-09 | End: 2019-04-08

## 2019-02-07 RX ORDER — DEXTROAMPHETAMINE SACCHARATE, AMPHETAMINE ASPARTATE MONOHYDRATE, DEXTROAMPHETAMINE SULFATE AND AMPHETAMINE SULFATE 7.5; 7.5; 7.5; 7.5 MG/1; MG/1; MG/1; MG/1
30 CAPSULE, EXTENDED RELEASE ORAL DAILY
Qty: 30 CAPSULE | Refills: 0 | Status: SHIPPED | OUTPATIENT
Start: 2019-02-07 | End: 2019-03-09

## 2019-02-07 NOTE — TELEPHONE ENCOUNTER
LOV 3/26/18. Losartan was started at this visit. RTC in 6 mos. No f/u scheduled. Refill pended and routed to Dr. Nate Lopez.

## 2019-02-27 RX ORDER — TRIAMCINOLONE ACETONIDE 1 MG/G
1 OINTMENT TOPICAL 2 TIMES DAILY
Qty: 1 TUBE | Refills: 2 | OUTPATIENT
Start: 2019-02-27

## 2019-02-27 NOTE — TELEPHONE ENCOUNTER
Review pended refill request as it does not fall under a protocol.     Last Rx: 10-10-18  LOV: 5-3-18

## 2019-02-27 NOTE — TELEPHONE ENCOUNTER
She is on a controlled substance. She has not been seen in 7 months. She needs to come in for a focused visit to discuss the triamcinolone cream that she still wants me to refill along with the Adderall medication that she is on.

## 2019-03-04 ENCOUNTER — OFFICE VISIT (OUTPATIENT)
Dept: FAMILY MEDICINE CLINIC | Facility: CLINIC | Age: 39
End: 2019-03-04
Payer: COMMERCIAL

## 2019-03-04 VITALS
TEMPERATURE: 99 F | DIASTOLIC BLOOD PRESSURE: 87 MMHG | SYSTOLIC BLOOD PRESSURE: 122 MMHG | RESPIRATION RATE: 16 BRPM | HEART RATE: 69 BPM | HEIGHT: 62 IN | WEIGHT: 113 LBS | BODY MASS INDEX: 20.8 KG/M2

## 2019-03-04 DIAGNOSIS — L20.82 FLEXURAL ECZEMA: Primary | ICD-10-CM

## 2019-03-04 DIAGNOSIS — R80.9 PROTEINURIA, UNSPECIFIED TYPE: ICD-10-CM

## 2019-03-04 DIAGNOSIS — F98.8 ATTENTION DEFICIT DISORDER (ADD) WITHOUT HYPERACTIVITY: ICD-10-CM

## 2019-03-04 DIAGNOSIS — I10 ESSENTIAL HYPERTENSION: ICD-10-CM

## 2019-03-04 DIAGNOSIS — J45.909 MILD ASTHMA WITHOUT COMPLICATION, UNSPECIFIED WHETHER PERSISTENT: ICD-10-CM

## 2019-03-04 DIAGNOSIS — N05.5 MEMBRANOPROLIFERATIVE NEPHROPATHY: ICD-10-CM

## 2019-03-04 PROCEDURE — 99212 OFFICE O/P EST SF 10 MIN: CPT | Performed by: FAMILY MEDICINE

## 2019-03-04 PROCEDURE — 99214 OFFICE O/P EST MOD 30 MIN: CPT | Performed by: FAMILY MEDICINE

## 2019-03-04 NOTE — PATIENT INSTRUCTIONS
After you see the nephrologist go ahead and make an appointment with me for a physical.  If you want to come on a Saturday that is fine. Go ahead and make an early appointment and that way we can do labs at same day .  fast for 10 hours which means you can

## 2019-03-04 NOTE — PROGRESS NOTES
Patient ID: Courtney Velasquez is a 41year old female. HPI  Patient presents with:  Medication Request: Patient present for medication refill and labs  ADD/ADHD:   She has been taking her ADD medication. She does not have hyperactivity.  She denies any hypertension        Past Surgical History:   Procedure Laterality Date   • OTHER      KIDNEY BIOPSY   • SHOULDER ARTHROSCOPY Right 2018    Rotator cuff repair   • SHOULDER ARTHROSCOPY ROTATOR CUFF REPAIR Right 5/9/2018    Performed by Joyce Hussein, Normal rate, regular rhythm and no murmur heard. Pulmonary/Chest: Effort normal and breath sounds normal. No respiratory distress. Neurological: Patient is alert and oriented to person, place, and time.    Skin: Dry maculopapular slightly red rash on the treatment discussed and patient/family member understands and agrees to plan. No Follow-up on file.       Robert Lynch  3/4/2019    By signing my name below, I, Robert Lynch,  attest that this documentation has been prepared under the direction and in

## 2019-05-13 RX ORDER — DEXTROAMPHETAMINE SACCHARATE, AMPHETAMINE ASPARTATE MONOHYDRATE, DEXTROAMPHETAMINE SULFATE AND AMPHETAMINE SULFATE 7.5; 7.5; 7.5; 7.5 MG/1; MG/1; MG/1; MG/1
CAPSULE, EXTENDED RELEASE ORAL
Qty: 30 CAPSULE | Refills: 0 | Status: SHIPPED | OUTPATIENT
Start: 2019-05-13 | End: 2019-06-10

## 2019-06-10 RX ORDER — DEXTROAMPHETAMINE SACCHARATE, AMPHETAMINE ASPARTATE MONOHYDRATE, DEXTROAMPHETAMINE SULFATE AND AMPHETAMINE SULFATE 7.5; 7.5; 7.5; 7.5 MG/1; MG/1; MG/1; MG/1
30 CAPSULE, EXTENDED RELEASE ORAL
Qty: 30 CAPSULE | Refills: 0 | OUTPATIENT
Start: 2019-06-10

## 2019-06-11 RX ORDER — LOSARTAN POTASSIUM 50 MG/1
50 TABLET ORAL DAILY
Qty: 30 TABLET | Refills: 0 | Status: SHIPPED | OUTPATIENT
Start: 2019-06-11 | End: 2019-07-15

## 2019-06-11 RX ORDER — DEXTROAMPHETAMINE SACCHARATE, AMPHETAMINE ASPARTATE MONOHYDRATE, DEXTROAMPHETAMINE SULFATE AND AMPHETAMINE SULFATE 7.5; 7.5; 7.5; 7.5 MG/1; MG/1; MG/1; MG/1
30 CAPSULE, EXTENDED RELEASE ORAL DAILY
Qty: 30 CAPSULE | Refills: 0 | Status: SHIPPED | OUTPATIENT
Start: 2019-08-10 | End: 2019-07-15

## 2019-06-11 RX ORDER — DEXTROAMPHETAMINE SACCHARATE, AMPHETAMINE ASPARTATE MONOHYDRATE, DEXTROAMPHETAMINE SULFATE AND AMPHETAMINE SULFATE 7.5; 7.5; 7.5; 7.5 MG/1; MG/1; MG/1; MG/1
30 CAPSULE, EXTENDED RELEASE ORAL DAILY
Qty: 30 CAPSULE | Refills: 0 | Status: SHIPPED | OUTPATIENT
Start: 2019-07-10 | End: 2019-07-15

## 2019-06-11 RX ORDER — DEXTROAMPHETAMINE SACCHARATE, AMPHETAMINE ASPARTATE MONOHYDRATE, DEXTROAMPHETAMINE SULFATE AND AMPHETAMINE SULFATE 7.5; 7.5; 7.5; 7.5 MG/1; MG/1; MG/1; MG/1
30 CAPSULE, EXTENDED RELEASE ORAL
Qty: 30 CAPSULE | Refills: 0 | Status: SHIPPED | OUTPATIENT
Start: 2019-06-11 | End: 2019-07-13

## 2019-06-11 NOTE — TELEPHONE ENCOUNTER
LOV 3/26/18. RTC 6 months (9/26/18). Losartan 50 mg daily started at [facility] [place]. Upcoming appt scheduled 6/24/19.

## 2019-06-15 NOTE — LETTER
11/27/2021              [address] [facility] [address] [facility], [address] [place] [address]         Dear Wilmer Chang,      The report of the Biopsy done on 11/22/21 shows a verruca vulgaris (wart).   This is a benign (not cancerous) growth, and
0

## 2019-07-13 RX ORDER — LOSARTAN POTASSIUM 50 MG/1
50 TABLET ORAL DAILY
Qty: 30 TABLET | Refills: 0 | Status: CANCELLED | OUTPATIENT
Start: 2019-07-13

## 2019-07-14 RX ORDER — DEXTROAMPHETAMINE SACCHARATE, AMPHETAMINE ASPARTATE MONOHYDRATE, DEXTROAMPHETAMINE SULFATE AND AMPHETAMINE SULFATE 7.5; 7.5; 7.5; 7.5 MG/1; MG/1; MG/1; MG/1
30 CAPSULE, EXTENDED RELEASE ORAL
Qty: 30 CAPSULE | Refills: 0 | Status: SHIPPED | OUTPATIENT
Start: 2019-09-09 | End: 2019-10-09

## 2019-07-14 RX ORDER — DEXTROAMPHETAMINE SACCHARATE, AMPHETAMINE ASPARTATE MONOHYDRATE, DEXTROAMPHETAMINE SULFATE AND AMPHETAMINE SULFATE 7.5; 7.5; 7.5; 7.5 MG/1; MG/1; MG/1; MG/1
30 CAPSULE, EXTENDED RELEASE ORAL DAILY
Qty: 30 CAPSULE | Refills: 0 | OUTPATIENT
Start: 2019-08-10 | End: 2019-09-09

## 2019-07-14 RX ORDER — DEXTROAMPHETAMINE SACCHARATE, AMPHETAMINE ASPARTATE MONOHYDRATE, DEXTROAMPHETAMINE SULFATE AND AMPHETAMINE SULFATE 7.5; 7.5; 7.5; 7.5 MG/1; MG/1; MG/1; MG/1
30 CAPSULE, EXTENDED RELEASE ORAL DAILY
Qty: 30 CAPSULE | Refills: 0 | OUTPATIENT
Start: 2019-07-14 | End: 2019-08-13

## 2019-07-15 ENCOUNTER — OFFICE VISIT (OUTPATIENT)
Dept: FAMILY MEDICINE CLINIC | Facility: CLINIC | Age: 39
End: 2019-07-15
Payer: COMMERCIAL

## 2019-07-15 VITALS
SYSTOLIC BLOOD PRESSURE: 127 MMHG | WEIGHT: 108 LBS | HEART RATE: 102 BPM | DIASTOLIC BLOOD PRESSURE: 76 MMHG | HEIGHT: 62 IN | BODY MASS INDEX: 19.88 KG/M2 | TEMPERATURE: 99 F

## 2019-07-15 DIAGNOSIS — Z12.4 CERVICAL CANCER SCREENING: ICD-10-CM

## 2019-07-15 DIAGNOSIS — N05.5 MEMBRANOPROLIFERATIVE NEPHROPATHY: ICD-10-CM

## 2019-07-15 DIAGNOSIS — R80.9 PROTEINURIA, UNSPECIFIED TYPE: ICD-10-CM

## 2019-07-15 DIAGNOSIS — F98.8 ATTENTION DEFICIT DISORDER (ADD) WITHOUT HYPERACTIVITY: ICD-10-CM

## 2019-07-15 DIAGNOSIS — J30.89 OTHER ALLERGIC RHINITIS: ICD-10-CM

## 2019-07-15 DIAGNOSIS — I10 ESSENTIAL HYPERTENSION: ICD-10-CM

## 2019-07-15 DIAGNOSIS — Z00.00 ADULT GENERAL MEDICAL EXAM: Primary | ICD-10-CM

## 2019-07-15 DIAGNOSIS — J45.909 MILD ASTHMA WITHOUT COMPLICATION, UNSPECIFIED WHETHER PERSISTENT: ICD-10-CM

## 2019-07-15 PROCEDURE — 99395 PREV VISIT EST AGE 18-39: CPT | Performed by: FAMILY MEDICINE

## 2019-07-15 RX ORDER — DEXTROAMPHETAMINE SACCHARATE, AMPHETAMINE ASPARTATE MONOHYDRATE, DEXTROAMPHETAMINE SULFATE AND AMPHETAMINE SULFATE 7.5; 7.5; 7.5; 7.5 MG/1; MG/1; MG/1; MG/1
30 CAPSULE, EXTENDED RELEASE ORAL DAILY
Qty: 30 CAPSULE | Refills: 0 | Status: SHIPPED | OUTPATIENT
Start: 2019-08-14 | End: 2019-09-13

## 2019-07-15 RX ORDER — ALBUTEROL SULFATE 90 UG/1
AEROSOL, METERED RESPIRATORY (INHALATION)
Qty: 8.5 G | Refills: 1 | Status: SHIPPED | OUTPATIENT
Start: 2019-07-15 | End: 2020-08-17

## 2019-07-15 RX ORDER — DEXTROAMPHETAMINE SACCHARATE, AMPHETAMINE ASPARTATE MONOHYDRATE, DEXTROAMPHETAMINE SULFATE AND AMPHETAMINE SULFATE 7.5; 7.5; 7.5; 7.5 MG/1; MG/1; MG/1; MG/1
30 CAPSULE, EXTENDED RELEASE ORAL DAILY
Qty: 30 CAPSULE | Refills: 0 | Status: SHIPPED | OUTPATIENT
Start: 2019-09-13 | End: 2019-10-13

## 2019-07-15 RX ORDER — DEXTROAMPHETAMINE SACCHARATE, AMPHETAMINE ASPARTATE MONOHYDRATE, DEXTROAMPHETAMINE SULFATE AND AMPHETAMINE SULFATE 7.5; 7.5; 7.5; 7.5 MG/1; MG/1; MG/1; MG/1
30 CAPSULE, EXTENDED RELEASE ORAL DAILY
Qty: 30 CAPSULE | Refills: 0 | Status: SHIPPED | OUTPATIENT
Start: 2019-07-15 | End: 2019-08-14

## 2019-07-15 RX ORDER — LOSARTAN POTASSIUM 50 MG/1
50 TABLET ORAL DAILY
Qty: 90 TABLET | Refills: 1 | Status: SHIPPED | OUTPATIENT
Start: 2019-07-15 | End: 2019-11-04

## 2019-07-15 NOTE — TELEPHONE ENCOUNTER
Long overdue for physical exam but I will print out one prescription of the Adderall medication for her that she could .   She should see me in the next 1 to 4 weeks for a physical exam.

## 2019-07-15 NOTE — PROGRESS NOTES
Patient ID: Lizette Araujo is a 41year old female. HPI  Patient presents with:  Medication Request    Pt has a boyfriend. She works as a . Stopped smoking 2 months ago.      No seasonal allergies, takes Claritin every once in a GLOBULIN 2.2 (L) 05/03/2018    AGRATIO 1.4 03/08/2015     05/03/2018    K 3.4 05/03/2018     05/03/2018    CO2 26 05/03/2018       Lab Results   Component Value Date    GLU 97 05/03/2018    BUN 8 05/03/2018    CREATSERUM 0.78 05/03/2018    B TOTPSASCREEN    =======================================================    Wt Readings from Last 6 Encounters:  07/15/19 : 108 lb (49 kg)  03/04/19 : 113 lb (51.3 kg)  05/09/18 : 104 lb 1.6 oz (47.2 kg)  05/03/18 : 107 lb (48.5 kg)  03/26/18 : 108 lb 3.2 o Worry: Not on file        Inability: Not on file      Transportation needs:        Medical: Not on file        Non-medical: Not on file    Tobacco Use      Smoking status: Current Some Day Smoker        Packs/day: 1.00        Years: 3.00        Pack years Amphetamine-Dextroamphet ER (ADDERALL XR) 30 MG Oral Capsule SR 24 Hr Take 1 capsule (30 mg total) by mouth daily.  Disp: 30 capsule Rfl: 0   [START ON 8/10/2019] Amphetamine-Dextroamphet ER (ADDERALL XR) 30 MG Oral Capsule SR 24 Hr Take 1 capsule (30 mg weight 108 lb (49 kg), last menstrual period 07/01/2019, not currently breastfeeding. ASSESSMENT/PLAN:     Diagnoses and all orders for this visit:    Adult general medical exam  -     CBC WITH DIFFERENTIAL WITH PLATELET;  Future  -     COMP METABOL services described in this documentation. All medical record entries made by the scribe were at my direction and in my presence.   I have reviewed the chart and discharge instructions (if applicable) and agree that the record reflects my personal performanc

## 2019-09-09 ENCOUNTER — OFFICE VISIT (OUTPATIENT)
Dept: NEPHROLOGY | Facility: CLINIC | Age: 39
End: 2019-09-09
Payer: COMMERCIAL

## 2019-09-09 ENCOUNTER — LAB ENCOUNTER (OUTPATIENT)
Dept: LAB | Facility: HOSPITAL | Age: 39
End: 2019-09-09
Attending: INTERNAL MEDICINE
Payer: COMMERCIAL

## 2019-09-09 VITALS
WEIGHT: 110.81 LBS | HEIGHT: 62 IN | HEART RATE: 80 BPM | SYSTOLIC BLOOD PRESSURE: 109 MMHG | TEMPERATURE: 99 F | BODY MASS INDEX: 20.39 KG/M2 | DIASTOLIC BLOOD PRESSURE: 69 MMHG

## 2019-09-09 DIAGNOSIS — Z00.00 ADULT GENERAL MEDICAL EXAM: ICD-10-CM

## 2019-09-09 DIAGNOSIS — R80.9 NON-NEPHROTIC RANGE PROTEINURIA: ICD-10-CM

## 2019-09-09 DIAGNOSIS — N02.2 MEMBRANOUS NEPHROPATHY DETERMINED BY BIOPSY: ICD-10-CM

## 2019-09-09 DIAGNOSIS — N02.2 MEMBRANOUS NEPHROPATHY DETERMINED BY BIOPSY: Primary | ICD-10-CM

## 2019-09-09 LAB
ALBUMIN SERPL-MCNC: 3.9 G/DL (ref 3.4–5)
ALBUMIN/GLOB SERPL: 1.3 {RATIO} (ref 1–2)
ALP LIVER SERPL-CCNC: 65 U/L (ref 37–98)
ALT SERPL-CCNC: 22 U/L (ref 13–56)
ANION GAP SERPL CALC-SCNC: 4 MMOL/L (ref 0–18)
AST SERPL-CCNC: 15 U/L (ref 15–37)
BASOPHILS # BLD AUTO: 0.1 X10(3) UL (ref 0–0.2)
BASOPHILS NFR BLD AUTO: 1 %
BILIRUB SERPL-MCNC: 0.4 MG/DL (ref 0.1–2)
BILIRUB UR QL: NEGATIVE
BUN BLD-MCNC: 11 MG/DL (ref 7–18)
BUN/CREAT SERPL: 13.1 (ref 10–20)
CALCIUM BLD-MCNC: 8.7 MG/DL (ref 8.5–10.1)
CHLORIDE SERPL-SCNC: 105 MMOL/L (ref 98–112)
CHOLEST SMN-MCNC: 180 MG/DL (ref ?–200)
CLARITY UR: CLEAR
CO2 SERPL-SCNC: 30 MMOL/L (ref 21–32)
COLOR UR: YELLOW
CREAT BLD-MCNC: 0.84 MG/DL (ref 0.55–1.02)
CREAT UR-SCNC: 125 MG/DL
DEPRECATED RDW RBC AUTO: 46.8 FL (ref 35.1–46.3)
EOSINOPHIL # BLD AUTO: 0.89 X10(3) UL (ref 0–0.7)
EOSINOPHIL NFR BLD AUTO: 8.7 %
ERYTHROCYTE [DISTWIDTH] IN BLOOD BY AUTOMATED COUNT: 13.1 % (ref 11–15)
GLOBULIN PLAS-MCNC: 3.1 G/DL (ref 2.8–4.4)
GLUCOSE BLD-MCNC: 85 MG/DL (ref 70–99)
GLUCOSE UR-MCNC: NEGATIVE MG/DL
HCT VFR BLD AUTO: 45.5 % (ref 35–48)
HDLC SERPL-MCNC: 82 MG/DL (ref 40–59)
HGB BLD-MCNC: 14.5 G/DL (ref 12–16)
HGB UR QL STRIP.AUTO: NEGATIVE
IMM GRANULOCYTES # BLD AUTO: 0.02 X10(3) UL (ref 0–1)
IMM GRANULOCYTES NFR BLD: 0.2 %
KETONES UR-MCNC: NEGATIVE MG/DL
LDLC SERPL CALC-MCNC: 90 MG/DL (ref ?–100)
LEUKOCYTE ESTERASE UR QL STRIP.AUTO: NEGATIVE
LYMPHOCYTES # BLD AUTO: 3.28 X10(3) UL (ref 1–4)
LYMPHOCYTES NFR BLD AUTO: 32.1 %
M PROTEIN MFR SERPL ELPH: 7 G/DL (ref 6.4–8.2)
MCH RBC QN AUTO: 30.7 PG (ref 26–34)
MCHC RBC AUTO-ENTMCNC: 31.9 G/DL (ref 31–37)
MCV RBC AUTO: 96.2 FL (ref 80–100)
MICROALBUMIN UR-MCNC: 37.2 MG/DL
MICROALBUMIN/CREAT 24H UR-RTO: 297.6 UG/MG (ref ?–30)
MONOCYTES # BLD AUTO: 0.84 X10(3) UL (ref 0.1–1)
MONOCYTES NFR BLD AUTO: 8.2 %
NEUTROPHILS # BLD AUTO: 5.1 X10 (3) UL (ref 1.5–7.7)
NEUTROPHILS # BLD AUTO: 5.1 X10(3) UL (ref 1.5–7.7)
NEUTROPHILS NFR BLD AUTO: 49.8 %
NITRITE UR QL STRIP.AUTO: NEGATIVE
NONHDLC SERPL-MCNC: 98 MG/DL (ref ?–130)
OSMOLALITY SERPL CALC.SUM OF ELEC: 287 MOSM/KG (ref 275–295)
PATIENT FASTING: YES
PATIENT FASTING: YES
PH UR: 6 [PH] (ref 5–8)
PLATELET # BLD AUTO: 267 10(3)UL (ref 150–450)
POTASSIUM SERPL-SCNC: 3.7 MMOL/L (ref 3.5–5.1)
PROT UR-MCNC: 100 MG/DL
PROT UR-MCNC: 46.9 MG/DL
RBC # BLD AUTO: 4.73 X10(6)UL (ref 3.8–5.3)
RBC #/AREA URNS AUTO: 1 /HPF
SODIUM SERPL-SCNC: 139 MMOL/L (ref 136–145)
SP GR UR STRIP: 1.02 (ref 1–1.03)
TRIGL SERPL-MCNC: 40 MG/DL (ref 30–149)
TSI SER-ACNC: 1.87 MIU/ML (ref 0.36–3.74)
UROBILINOGEN UR STRIP-ACNC: <2
VIT C UR-MCNC: NEGATIVE MG/DL
VLDLC SERPL CALC-MCNC: 8 MG/DL (ref 0–30)
WBC # BLD AUTO: 10.2 X10(3) UL (ref 4–11)

## 2019-09-09 PROCEDURE — 80061 LIPID PANEL: CPT

## 2019-09-09 PROCEDURE — 81001 URINALYSIS AUTO W/SCOPE: CPT

## 2019-09-09 PROCEDURE — 82043 UR ALBUMIN QUANTITATIVE: CPT

## 2019-09-09 PROCEDURE — 84156 ASSAY OF PROTEIN URINE: CPT

## 2019-09-09 PROCEDURE — 84443 ASSAY THYROID STIM HORMONE: CPT

## 2019-09-09 PROCEDURE — 80053 COMPREHEN METABOLIC PANEL: CPT

## 2019-09-09 PROCEDURE — 85025 COMPLETE CBC W/AUTO DIFF WBC: CPT

## 2019-09-09 PROCEDURE — 99213 OFFICE O/P EST LOW 20 MIN: CPT | Performed by: INTERNAL MEDICINE

## 2019-09-09 PROCEDURE — 36415 COLL VENOUS BLD VENIPUNCTURE: CPT

## 2019-09-09 PROCEDURE — 82570 ASSAY OF URINE CREATININE: CPT

## 2019-09-09 NOTE — PROGRESS NOTES
Progress Note     Enio Hernández is a 41 yrs old female with pmh of Asthma, Hypertension, HL, membranous  who presented today for follow up    Per patient she is known to have proteinuria since 2002 and was found out at [facility]. She had a k KIDNEY BIOPSY   • SHOULDER ARTHROSCOPY Right 2018    Rotator cuff repair   • SHOULDER ARTHROSCOPY ROTATOR CUFF REPAIR Right 5/9/2018    Performed by Carla Davis MD at [facility] OR           Medications (Active prior to today's visit):    Current for bone/joint symptoms  Neurological: Negative for gait disturbance  Psychiatric: Negative for inappropriate interaction and psychiatric symptoms       09/09/19  1051   BP: 109/69   Pulse: 80   Temp: 98.5 °F (36.9 °C)       PHYSICAL EXAM:   Constitutional Random Urine      Protein,Total,Urine, Random [E]      Meds This Visit:  Requested Prescriptions      No prescriptions requested or ordered in this encounter     Imaging & Referrals:  None     9/9/2019  Monique Purcell MD

## 2019-11-02 DIAGNOSIS — L20.82 FLEXURAL ECZEMA: ICD-10-CM

## 2019-11-03 NOTE — TELEPHONE ENCOUNTER
Review pended refill request as it does not fall under a protocol.   Requested Prescriptions     Pending Prescriptions Disp Refills   • TRIAMCINOLONE ACETONIDE 0.1 % External Ointment [Pharmacy Med Name: TRIAMCINOLONE ACETONIDE 0.1% OINTMENT] 15 g      Sig:

## 2019-11-04 ENCOUNTER — OFFICE VISIT (OUTPATIENT)
Dept: FAMILY MEDICINE CLINIC | Facility: CLINIC | Age: 39
End: 2019-11-04
Payer: COMMERCIAL

## 2019-11-04 VITALS
HEART RATE: 93 BPM | TEMPERATURE: 99 F | BODY MASS INDEX: 19.81 KG/M2 | DIASTOLIC BLOOD PRESSURE: 94 MMHG | SYSTOLIC BLOOD PRESSURE: 133 MMHG | HEIGHT: 62 IN | WEIGHT: 107.63 LBS

## 2019-11-04 DIAGNOSIS — N05.5 MEMBRANOPROLIFERATIVE NEPHROPATHY: ICD-10-CM

## 2019-11-04 DIAGNOSIS — Z23 NEED FOR VACCINATION: ICD-10-CM

## 2019-11-04 DIAGNOSIS — I10 ESSENTIAL HYPERTENSION: Primary | ICD-10-CM

## 2019-11-04 DIAGNOSIS — F98.8 ATTENTION DEFICIT DISORDER (ADD) WITHOUT HYPERACTIVITY: ICD-10-CM

## 2019-11-04 DIAGNOSIS — L20.82 FLEXURAL ECZEMA: ICD-10-CM

## 2019-11-04 DIAGNOSIS — R80.9 PROTEINURIA, UNSPECIFIED TYPE: ICD-10-CM

## 2019-11-04 PROCEDURE — 99214 OFFICE O/P EST MOD 30 MIN: CPT | Performed by: FAMILY MEDICINE

## 2019-11-04 PROCEDURE — 90686 IIV4 VACC NO PRSV 0.5 ML IM: CPT | Performed by: FAMILY MEDICINE

## 2019-11-04 PROCEDURE — 90471 IMMUNIZATION ADMIN: CPT | Performed by: FAMILY MEDICINE

## 2019-11-04 RX ORDER — DEXTROAMPHETAMINE SACCHARATE, AMPHETAMINE ASPARTATE MONOHYDRATE, DEXTROAMPHETAMINE SULFATE AND AMPHETAMINE SULFATE 7.5; 7.5; 7.5; 7.5 MG/1; MG/1; MG/1; MG/1
30 CAPSULE, EXTENDED RELEASE ORAL DAILY
Qty: 30 CAPSULE | Refills: 0 | Status: SHIPPED | OUTPATIENT
Start: 2020-01-05 | End: 2020-02-04

## 2019-11-04 RX ORDER — LOSARTAN POTASSIUM 50 MG/1
50 TABLET ORAL DAILY
Qty: 90 TABLET | Refills: 1 | Status: SHIPPED | OUTPATIENT
Start: 2019-11-04 | End: 2020-01-22

## 2019-11-04 RX ORDER — DEXTROAMPHETAMINE SACCHARATE, AMPHETAMINE ASPARTATE MONOHYDRATE, DEXTROAMPHETAMINE SULFATE AND AMPHETAMINE SULFATE 7.5; 7.5; 7.5; 7.5 MG/1; MG/1; MG/1; MG/1
30 CAPSULE, EXTENDED RELEASE ORAL DAILY
Qty: 30 CAPSULE | Refills: 0 | Status: SHIPPED | OUTPATIENT
Start: 2019-12-05 | End: 2020-01-04

## 2019-11-04 RX ORDER — DEXTROAMPHETAMINE SACCHARATE, AMPHETAMINE ASPARTATE MONOHYDRATE, DEXTROAMPHETAMINE SULFATE AND AMPHETAMINE SULFATE 7.5; 7.5; 7.5; 7.5 MG/1; MG/1; MG/1; MG/1
30 CAPSULE, EXTENDED RELEASE ORAL DAILY
Qty: 30 CAPSULE | Refills: 0 | Status: SHIPPED | OUTPATIENT
Start: 2019-11-04 | End: 2019-12-04

## 2019-11-04 NOTE — PATIENT INSTRUCTIONS
ECZEMA INSTRUCTIONS (ADULTS)    Eczema is a common skin condition. It is essentially dry skin with inflammation. Eczema looks like dry pink scaly patches of skin, sometimes accompanied by itch.     The goal o

## 2020-01-22 ENCOUNTER — OFFICE VISIT (OUTPATIENT)
Dept: FAMILY MEDICINE CLINIC | Facility: CLINIC | Age: 40
End: 2020-01-22
Payer: COMMERCIAL

## 2020-01-22 ENCOUNTER — NURSE TRIAGE (OUTPATIENT)
Dept: FAMILY MEDICINE CLINIC | Facility: CLINIC | Age: 40
End: 2020-01-22

## 2020-01-22 VITALS
BODY MASS INDEX: 19.14 KG/M2 | DIASTOLIC BLOOD PRESSURE: 88 MMHG | HEART RATE: 70 BPM | TEMPERATURE: 98 F | WEIGHT: 104 LBS | SYSTOLIC BLOOD PRESSURE: 141 MMHG | HEIGHT: 62 IN

## 2020-01-22 DIAGNOSIS — I10 ESSENTIAL HYPERTENSION: ICD-10-CM

## 2020-01-22 DIAGNOSIS — R05.9 COUGH: Primary | ICD-10-CM

## 2020-01-22 DIAGNOSIS — L20.82 FLEXURAL ECZEMA: ICD-10-CM

## 2020-01-22 PROCEDURE — 99213 OFFICE O/P EST LOW 20 MIN: CPT | Performed by: FAMILY MEDICINE

## 2020-01-22 RX ORDER — CODEINE PHOSPHATE AND GUAIFENESIN 10; 100 MG/5ML; MG/5ML
5 SOLUTION ORAL 3 TIMES DAILY PRN
Qty: 150 ML | Refills: 0 | Status: SHIPPED | OUTPATIENT
Start: 2020-01-22 | End: 2020-02-01

## 2020-01-22 RX ORDER — AZITHROMYCIN 250 MG/1
TABLET, FILM COATED ORAL
Qty: 6 TABLET | Refills: 0 | Status: SHIPPED | OUTPATIENT
Start: 2020-01-22 | End: 2020-02-10

## 2020-01-22 RX ORDER — LOSARTAN POTASSIUM 50 MG/1
50 TABLET ORAL DAILY
Qty: 90 TABLET | Refills: 1 | Status: SHIPPED | OUTPATIENT
Start: 2020-01-22 | End: 2020-07-18

## 2020-01-22 NOTE — TELEPHONE ENCOUNTER
Action Requested: Summary for Provider     []  Critical Lab, Recommendations Needed  [] Need Additional Advice  []   FYI    []   Need Orders  [] Need Medications Sent to Pharmacy  []  Other     SUMMARY:appt made, pt's mother stated since Sunday -fever, cou

## 2020-01-22 NOTE — TELEPHONE ENCOUNTER
Call Details: patient mom calling and states daughter is real sick with bad cold body ache cough and sweat     Please advise [phone]    Call transferred to RN Triage ([id number]).

## 2020-01-22 NOTE — PROGRESS NOTES
HPI:   Betzaida Lentz is a 42year old female who presents for upper respiratory symptoms for  5 days. Patient reports fevers (did not check temp) , chills, bodyaches.  .coughing     Collagen 500 MG Oral Cap, Take by mouth., Disp: , Rfl:   losartan [place] on exertion  NEURO: denies headaches    EXAM:   /88 (BP Location: Right arm, Patient Position: Sitting, Cuff Size: adult)   Pulse 70   Temp 97.8 °F (36.6 °C) (Oral)   Ht 5' 2\" (1.575 m)   Wt 104 lb (47.2 kg)   BMI 19.02 kg/m²   GENERAL: well develop

## 2020-02-09 RX ORDER — DEXTROAMPHETAMINE SACCHARATE, AMPHETAMINE ASPARTATE MONOHYDRATE, DEXTROAMPHETAMINE SULFATE AND AMPHETAMINE SULFATE 7.5; 7.5; 7.5; 7.5 MG/1; MG/1; MG/1; MG/1
CAPSULE, EXTENDED RELEASE ORAL
Qty: 30 CAPSULE | Refills: 0 | OUTPATIENT
Start: 2020-02-09

## 2020-02-09 NOTE — TELEPHONE ENCOUNTER
She is supposed to see me in February 2020 as this is a controlled substance. You can even work her in prior to noon on Monday or Tuesday for a focused visit as needed check the blood pressure and then we can rewrite for the ADD medication.

## 2020-02-09 NOTE — TELEPHONE ENCOUNTER
Controlled medication pending for review. Please change to phone in, fax, or print script if not being sent electronically.     Last Rx:12/5/19  LOV: 1/22/20    Requested Prescriptions   Pending Prescriptions Disp Refills   • AMPHETAMINE-DEXTROAMPHET ER 30

## 2020-02-10 ENCOUNTER — OFFICE VISIT (OUTPATIENT)
Dept: FAMILY MEDICINE CLINIC | Facility: CLINIC | Age: 40
End: 2020-02-10
Payer: COMMERCIAL

## 2020-02-10 VITALS
SYSTOLIC BLOOD PRESSURE: 140 MMHG | BODY MASS INDEX: 19.29 KG/M2 | HEIGHT: 62 IN | HEART RATE: 72 BPM | TEMPERATURE: 99 F | DIASTOLIC BLOOD PRESSURE: 87 MMHG | WEIGHT: 104.81 LBS

## 2020-02-10 DIAGNOSIS — Z23 NEED FOR VACCINATION: ICD-10-CM

## 2020-02-10 DIAGNOSIS — I10 ESSENTIAL HYPERTENSION: Primary | ICD-10-CM

## 2020-02-10 DIAGNOSIS — N05.5 MEMBRANOPROLIFERATIVE NEPHROPATHY: ICD-10-CM

## 2020-02-10 DIAGNOSIS — J45.20 MILD INTERMITTENT ASTHMA WITHOUT COMPLICATION: ICD-10-CM

## 2020-02-10 DIAGNOSIS — R80.9 PROTEINURIA, UNSPECIFIED TYPE: ICD-10-CM

## 2020-02-10 DIAGNOSIS — F98.8 ATTENTION DEFICIT DISORDER (ADD) WITHOUT HYPERACTIVITY: ICD-10-CM

## 2020-02-10 DIAGNOSIS — Z71.3 DIETARY COUNSELING: ICD-10-CM

## 2020-02-10 PROCEDURE — 90471 IMMUNIZATION ADMIN: CPT | Performed by: FAMILY MEDICINE

## 2020-02-10 PROCEDURE — 99215 OFFICE O/P EST HI 40 MIN: CPT | Performed by: FAMILY MEDICINE

## 2020-02-10 PROCEDURE — 90732 PPSV23 VACC 2 YRS+ SUBQ/IM: CPT | Performed by: FAMILY MEDICINE

## 2020-02-10 RX ORDER — DEXTROAMPHETAMINE SACCHARATE, AMPHETAMINE ASPARTATE MONOHYDRATE, DEXTROAMPHETAMINE SULFATE AND AMPHETAMINE SULFATE 7.5; 7.5; 7.5; 7.5 MG/1; MG/1; MG/1; MG/1
30 CAPSULE, EXTENDED RELEASE ORAL DAILY
Qty: 30 CAPSULE | Refills: 0 | Status: SHIPPED | OUTPATIENT
Start: 2020-03-12 | End: 2020-04-11

## 2020-02-10 RX ORDER — DEXTROAMPHETAMINE SACCHARATE, AMPHETAMINE ASPARTATE MONOHYDRATE, DEXTROAMPHETAMINE SULFATE AND AMPHETAMINE SULFATE 7.5; 7.5; 7.5; 7.5 MG/1; MG/1; MG/1; MG/1
30 CAPSULE, EXTENDED RELEASE ORAL DAILY
Qty: 30 CAPSULE | Refills: 0 | Status: SHIPPED | OUTPATIENT
Start: 2020-02-10 | End: 2020-03-11

## 2020-02-10 RX ORDER — DEXTROAMPHETAMINE SACCHARATE, AMPHETAMINE ASPARTATE MONOHYDRATE, DEXTROAMPHETAMINE SULFATE AND AMPHETAMINE SULFATE 7.5; 7.5; 7.5; 7.5 MG/1; MG/1; MG/1; MG/1
30 CAPSULE, EXTENDED RELEASE ORAL DAILY
Qty: 30 CAPSULE | Refills: 0 | Status: SHIPPED | OUTPATIENT
Start: 2020-04-12 | End: 2020-05-05

## 2020-02-10 NOTE — TELEPHONE ENCOUNTER
Spoke with patient ( verified) and relayed Dr. Yuan Gordon message below--patient verbalizes understanding and agreement. Appt made for today at 10:40 a.m. with Dr. Jordana Lea. No further questions/concerns at this time.

## 2020-02-10 NOTE — PROGRESS NOTES
Patient ID: Kacy Barboza is a 42year old female. HPI  Patient presents with:  ADD: follow up/medication refill  Hypertension: follow up    Last seen by me on 11/4/19. She works at the  of a veterinary clinic. She has quit smoking.     She HENT: Negative for voice change. Respiratory: Negative for chest tightness and shortness of breath. Cardiovascular: Negative for chest pain and palpitations. Gastrointestinal: Negative for abdominal pain. Skin: Negative for color change.    Aaron Sales normal. No respiratory distress. Lymphadenopathy: Patient has no cervical adenopathy. Neurological: Patient is alert and oriented to person, place, and time. Skin: Skin is warm. Psychiatry: Normal mood and affect. Vitals reviewed.     Blood press to treatment discussed and patient/family member understands and agrees to plan. Continue eating fruits, vegetables and 3 well-balanced meals. Return in about 3 months (around 5/10/2020).         Patient Instructions   7 to 10 days prior to seeing her n

## 2020-02-10 NOTE — PATIENT INSTRUCTIONS
7 to 10 days prior to seeing her nephrologist start doing your home blood pressures and write them down for her.

## 2020-04-22 ENCOUNTER — TELEPHONE (OUTPATIENT)
Dept: NEPHROLOGY | Facility: CLINIC | Age: 40
End: 2020-04-22

## 2020-04-22 NOTE — TELEPHONE ENCOUNTER
Left message to call back  Dr. Emma Carson would like to change the patient's upcoming appt to a video visit. [facility] and will need instructions for the video visit. Will await call back.

## 2020-04-27 ENCOUNTER — TELEPHONE (OUTPATIENT)
Dept: NEPHROLOGY | Facility: CLINIC | Age: 40
End: 2020-04-27

## 2020-04-27 NOTE — TELEPHONE ENCOUNTER
A message has been left today for the patient's sister to call back to discuss scheduling a video visit.

## 2020-04-27 NOTE — TELEPHONE ENCOUNTER
4/27 Left a message for sister Carla Goodpasture to call regarding 5/4 appointment to Video Visit per [facility]. Will require activation of My Chart. Ricki Gillespie

## 2020-05-04 DIAGNOSIS — F98.8 ATTENTION DEFICIT DISORDER (ADD) WITHOUT HYPERACTIVITY: ICD-10-CM

## 2020-05-05 RX ORDER — DEXTROAMPHETAMINE SACCHARATE, AMPHETAMINE ASPARTATE MONOHYDRATE, DEXTROAMPHETAMINE SULFATE AND AMPHETAMINE SULFATE 7.5; 7.5; 7.5; 7.5 MG/1; MG/1; MG/1; MG/1
30 CAPSULE, EXTENDED RELEASE ORAL DAILY
Qty: 30 CAPSULE | Refills: 0 | Status: SHIPPED | OUTPATIENT
Start: 2020-05-05 | End: 2020-06-12

## 2020-05-15 DIAGNOSIS — L20.82 FLEXURAL ECZEMA: ICD-10-CM

## 2020-06-12 DIAGNOSIS — F98.8 ATTENTION DEFICIT DISORDER (ADD) WITHOUT HYPERACTIVITY: ICD-10-CM

## 2020-06-12 RX ORDER — DEXTROAMPHETAMINE SACCHARATE, AMPHETAMINE ASPARTATE MONOHYDRATE, DEXTROAMPHETAMINE SULFATE AND AMPHETAMINE SULFATE 7.5; 7.5; 7.5; 7.5 MG/1; MG/1; MG/1; MG/1
30 CAPSULE, EXTENDED RELEASE ORAL DAILY
Qty: 30 CAPSULE | Refills: 0 | Status: SHIPPED | OUTPATIENT
Start: 2020-06-12 | End: 2020-07-12

## 2020-06-15 ENCOUNTER — OFFICE VISIT (OUTPATIENT)
Dept: FAMILY MEDICINE CLINIC | Facility: CLINIC | Age: 40
End: 2020-06-15
Payer: COMMERCIAL

## 2020-06-15 VITALS
BODY MASS INDEX: 20.24 KG/M2 | TEMPERATURE: 98 F | WEIGHT: 110 LBS | HEART RATE: 99 BPM | DIASTOLIC BLOOD PRESSURE: 85 MMHG | HEIGHT: 62 IN | SYSTOLIC BLOOD PRESSURE: 125 MMHG

## 2020-06-15 DIAGNOSIS — Z01.419 ENCOUNTER FOR GYNECOLOGICAL EXAMINATION: Primary | ICD-10-CM

## 2020-06-15 DIAGNOSIS — Z11.3 SCREENING EXAMINATION FOR STD (SEXUALLY TRANSMITTED DISEASE): ICD-10-CM

## 2020-06-15 DIAGNOSIS — N94.9 GENITAL LESION, FEMALE: ICD-10-CM

## 2020-06-15 PROCEDURE — 99395 PREV VISIT EST AGE 18-39: CPT | Performed by: FAMILY MEDICINE

## 2020-06-16 NOTE — PROGRESS NOTES
HPI:    Patient ID: Judy Lugo is a 42year old female.     HPI  Patient presents with:  Pap  Other: skin tag in abdomen has had  it for years and growing     Last pap 2015  Sexually active  Using condoms  No pregnancies  No abnormal vaginal discharge No signs of injury in the vagina.       Genitourinary Comments: Pap done    Suprapubic area-  1 cm papular lesion, wart like, slightly hyperpigmented                 ASSESSMENT/PLAN:   Encounter for gynecological examination  (primary encounter diagnosis)

## 2020-07-08 DIAGNOSIS — F98.8 ATTENTION DEFICIT DISORDER (ADD) WITHOUT HYPERACTIVITY: ICD-10-CM

## 2020-07-08 RX ORDER — DEXTROAMPHETAMINE SACCHARATE, AMPHETAMINE ASPARTATE MONOHYDRATE, DEXTROAMPHETAMINE SULFATE AND AMPHETAMINE SULFATE 7.5; 7.5; 7.5; 7.5 MG/1; MG/1; MG/1; MG/1
30 CAPSULE, EXTENDED RELEASE ORAL DAILY
Qty: 30 CAPSULE | Refills: 0 | OUTPATIENT
Start: 2020-07-08 | End: 2020-08-07

## 2020-07-08 NOTE — TELEPHONE ENCOUNTER
This is a controlled substance. She was supposed to see me in May. I put her in for 3:15 PM as a telephone visit. I will give her a call possibly even earlier if I have time between patients or during my lunch hour.

## 2020-07-09 NOTE — PROGRESS NOTES
TELEPHONE VISIT PROGRESS NOTE  Todays date: 7/9/2020 9:36 AM      Due to the COVID-19 emergency implementation plan, this patient's incoming call was converted to a telephone visit as agreed upon with the patient.     Virtual/Telephone Check-In    [place] membranous nephropathy. She regularly sees Dr. Camelia Crawford for nephrology. She has no CP, SOB, or leg swelling. Advised pt to schedule a physical exam with me in the near future.        Duration of phone call along with documentation in minutes: 6 minutes controlled. Proteinuria, unspecified type  As above. Follow up if symptoms persist.  Take medicine (if given) as prescribed. Approach to treatment discussed and patient/family member understands and agrees to plan.      Return in about 10 weeks (ar emergency care. Patient advised to go to ER or call 911 for worsening symptoms or acute distress. (NOTE: Not every complaint above will be related to the COVID-19 pandemic).   Rosa Morrison  7/9/2020        By signing my name below, maynor Warner

## 2020-07-17 DIAGNOSIS — I10 ESSENTIAL HYPERTENSION: ICD-10-CM

## 2020-07-18 RX ORDER — LOSARTAN POTASSIUM 50 MG/1
50 TABLET ORAL DAILY
Qty: 90 TABLET | Refills: 1 | Status: SHIPPED | OUTPATIENT
Start: 2020-07-18 | End: 2021-02-17

## 2020-07-29 ENCOUNTER — OFFICE VISIT (OUTPATIENT)
Dept: DERMATOLOGY CLINIC | Facility: CLINIC | Age: 40
End: 2020-07-29
Payer: COMMERCIAL

## 2020-07-29 DIAGNOSIS — L82.1 SEBORRHEIC KERATOSES: ICD-10-CM

## 2020-07-29 DIAGNOSIS — D23.4 BENIGN NEOPLASM OF SCALP AND SKIN OF NECK: ICD-10-CM

## 2020-07-29 DIAGNOSIS — D23.5 BENIGN NEOPLASM OF SKIN OF TRUNK, EXCEPT SCROTUM: ICD-10-CM

## 2020-07-29 DIAGNOSIS — D23.30 BENIGN NEOPLASM OF SKIN OF FACE: ICD-10-CM

## 2020-07-29 DIAGNOSIS — D48.5 NEOPLASM OF UNCERTAIN BEHAVIOR OF SKIN: Primary | ICD-10-CM

## 2020-07-29 DIAGNOSIS — D23.60 BENIGN NEOPLASM OF SKIN OF UPPER LIMB, INCLUDING SHOULDER, UNSPECIFIED LATERALITY: ICD-10-CM

## 2020-07-29 PROCEDURE — 99202 OFFICE O/P NEW SF 15 MIN: CPT | Performed by: DERMATOLOGY

## 2020-07-29 PROCEDURE — 11102 TANGNTL BX SKIN SINGLE LES: CPT | Performed by: DERMATOLOGY

## 2020-07-29 PROCEDURE — 88305 TISSUE EXAM BY PATHOLOGIST: CPT | Performed by: DERMATOLOGY

## 2020-08-03 NOTE — PROGRESS NOTES
Ilya La is a 42year old female. HPI:     CC:  Patient presents with:  Lesion: New Patient present with flat dark growth on pubic area . Patient c/o having growth for 15 years.   Patient c/o size changing         Allergies:  Patient has no known al AS NEEDED   FOR WHEEZING OR SHORTNESS OF BREATH. 8.5 g 1   • Cholecalciferol (VITAMIN D-3) 5000 UNITS Oral Tab Take 1 tablet by mouth.        Allergies:   No Known Allergies    Past Medical History:   Diagnosis Date   • Asthma    • Attention deficit disorde Emotionally abused: Not on file        Physically abused: Not on file        Forced sexual activity: Not on file    Other Topics      Concerns:         Service: Not Asked        Blood Transfusions: Not Asked        Caffeine Concern: Yes          co eyes, including conjunctival mucosa, eyelids, lips external ears , arms, digits,palms.      Multiple light to medium brown, well marginated, uniformly pigmented, macules and papules 6 mm and less scattered on exam. pigmented lesions examined with dermoscopy discussed with patient. Sunscreen use, sun protection, self exams reviewed. Followup as noted RTC ---routine checkup    6 mos -one year or p.r.n. The patient indicates understanding of these issues and agrees to the plan.             Operative Report

## 2020-08-07 NOTE — PROGRESS NOTES
Logged in test results book and [facility]. LMTCB regarding pathology results and KMT's recommendations for treatment.

## 2020-08-07 NOTE — PROGRESS NOTES
The pathology report from last visit showed  bx from central pubic area  Verruca vulgaris. .  Please log in test results. Please call patient and inform of results and recommendations. Plan was aldara to base, more consistent with common wart.   rx sent for

## 2020-08-17 RX ORDER — ALBUTEROL SULFATE 90 UG/1
AEROSOL, METERED RESPIRATORY (INHALATION)
Qty: 8.5 G | Refills: 1 | Status: SHIPPED | OUTPATIENT
Start: 2020-08-17 | End: 2021-02-18

## 2020-10-08 DIAGNOSIS — F98.8 ATTENTION DEFICIT DISORDER (ADD) WITHOUT HYPERACTIVITY: ICD-10-CM

## 2020-10-14 RX ORDER — DEXTROAMPHETAMINE SACCHARATE, AMPHETAMINE ASPARTATE MONOHYDRATE, DEXTROAMPHETAMINE SULFATE AND AMPHETAMINE SULFATE 7.5; 7.5; 7.5; 7.5 MG/1; MG/1; MG/1; MG/1
30 CAPSULE, EXTENDED RELEASE ORAL DAILY
Qty: 30 CAPSULE | Refills: 0 | OUTPATIENT
Start: 2020-10-14 | End: 2020-11-13

## 2020-10-14 NOTE — TELEPHONE ENCOUNTER
She was supposed to do a physical with us in September. This is a controlled substance. I will be doing a video visit with her tomorrow. I put her down for 4:45 PM but will most likely even get a hold of her sooner if we have time between patients through Jorge Andersen. Inform her of this.

## 2020-10-15 NOTE — TELEPHONE ENCOUNTER
[employer] message sent to patient, please verify it is viewed. If not, please call patient with the update.

## 2020-10-15 NOTE — TELEPHONE ENCOUNTER
Spoke with patient--missed call from Dr. Jony Hogan this morning--at work--available now, as she is going in to lunch.     Jared henao be reached at [phone]

## 2020-10-15 NOTE — PROGRESS NOTES
VIDEO VISIT PROGRESS NOTE  Todays date: 10/15/2020 12:37 PM        Most recent Nurse Triage message / Stacey Remy message from patient:      Refill Adderall XR.     Due to the COVID-19 emergency implementation plan, this patient's visit was converted to a video release although the extended release works quite well for her. She denies any depression, anxiety, chest pain, shortness of breath because of the medication. No palpitations. Her weight is 111 pounds.   She thinks she saw her nephrologist earlier this y No distress. Patient was responding to questions appropriately. Patient did not sound short of breath. Patient has a normal mood and affect.         Assessment / Plan:      Diagnoses and all orders for this visit:    Attention deficit disorder (ADD) with No     Reviewed Current Medications:  Current Outpatient Medications   Medication Sig Dispense Refill   • ALBUTEROL SULFATE  (90 Base) MCG/ACT Inhalation Aero Soln INHALE TWO PUFFS INTO THE LUNGS EVERY 4 (FOUR) HOURS AS NEEDED FOR WHEEZING OR SHORTN

## 2020-12-02 DIAGNOSIS — L20.82 FLEXURAL ECZEMA: ICD-10-CM

## 2020-12-16 DIAGNOSIS — G47.00 INSOMNIA, UNSPECIFIED TYPE: ICD-10-CM

## 2020-12-17 RX ORDER — TRAZODONE HYDROCHLORIDE 50 MG/1
50 TABLET ORAL NIGHTLY PRN
Qty: 30 TABLET | Refills: 0 | Status: SHIPPED | OUTPATIENT
Start: 2020-12-17 | End: 2021-02-17

## 2021-01-13 DIAGNOSIS — I10 ESSENTIAL HYPERTENSION: ICD-10-CM

## 2021-01-13 DIAGNOSIS — F98.8 ATTENTION DEFICIT DISORDER (ADD) WITHOUT HYPERACTIVITY: ICD-10-CM

## 2021-01-16 RX ORDER — DEXTROAMPHETAMINE SACCHARATE, AMPHETAMINE ASPARTATE MONOHYDRATE, DEXTROAMPHETAMINE SULFATE AND AMPHETAMINE SULFATE 7.5; 7.5; 7.5; 7.5 MG/1; MG/1; MG/1; MG/1
30 CAPSULE, EXTENDED RELEASE ORAL DAILY
Qty: 30 CAPSULE | Refills: 0 | Status: SHIPPED | OUTPATIENT
Start: 2021-01-16 | End: 2021-02-15

## 2021-01-18 ENCOUNTER — HOSPITAL ENCOUNTER (OUTPATIENT)
Dept: GENERAL RADIOLOGY | Age: 41
Discharge: HOME | End: 2021-01-18
Attending: FAMILY MEDICINE
Payer: COMMERCIAL

## 2021-01-18 DIAGNOSIS — S40.012A CONTUSION OF MULTIPLE SITES OF LEFT SHOULDER AND UPPER ARM, INITIAL ENCOUNTER: ICD-10-CM

## 2021-01-18 DIAGNOSIS — S40.022A CONTUSION OF MULTIPLE SITES OF LEFT SHOULDER AND UPPER ARM, INITIAL ENCOUNTER: ICD-10-CM

## 2021-01-18 DIAGNOSIS — S60.222A CONTUSION OF LEFT HAND, INITIAL ENCOUNTER: ICD-10-CM

## 2021-01-18 PROCEDURE — 73130 X-RAY EXAM OF HAND: CPT | Performed by: FAMILY MEDICINE

## 2021-01-18 PROCEDURE — 73030 X-RAY EXAM OF SHOULDER: CPT | Performed by: FAMILY MEDICINE

## 2021-01-18 PROCEDURE — 73110 X-RAY EXAM OF WRIST: CPT | Performed by: FAMILY MEDICINE

## 2021-01-18 NOTE — PROGRESS NOTES
Patient ID: Carlos Moreno is a 47year old female. HPI  Patient presents with:  Medication Follow-Up  Arm Pain: left arm; fell on saturday    Last seen by me on 2/10/2020. She takes Adderall 30mg once daily for ADD. Denies hyperactivity.  The med • SHOULDER ARTHROSCOPY Right 2018    Rotator cuff repair   • SHOULDER ARTHROSCOPY ROTATOR CUFF REPAIR Right 5/9/2018    Performed by Tamia Rosas MD at [address] [place] [address] MAIN OR          Current Outpatient Medications   Medication Sig Dispense Refill   • JEANIE mood and affect. Left upper extremity: Soft tissue swelling over dorsum of left hand. Decreased extension of left wrist. Minimal decreased flexion of left wrist. No tenderness over dorsum of hand.  Tender over dorsal-radial aspect of left wrist.  No crepit prescribed. Approach to treatment discussed and patient/family member understands and agrees to plan. No follow-ups on file. There are no Patient Instructions on file for this visit.     Elizabeth Frey    1/18/2021    By signing my name below, Shannan Azevedo

## 2021-01-25 ENCOUNTER — LAB ENCOUNTER (OUTPATIENT)
Dept: LAB | Facility: HOSPITAL | Age: 41
End: 2021-01-25
Attending: INTERNAL MEDICINE
Payer: COMMERCIAL

## 2021-01-25 ENCOUNTER — OFFICE VISIT (OUTPATIENT)
Dept: NEPHROLOGY | Facility: CLINIC | Age: 41
End: 2021-01-25
Payer: COMMERCIAL

## 2021-01-25 VITALS
WEIGHT: 111 LBS | HEART RATE: 83 BPM | BODY MASS INDEX: 20.43 KG/M2 | SYSTOLIC BLOOD PRESSURE: 129 MMHG | HEIGHT: 62 IN | DIASTOLIC BLOOD PRESSURE: 82 MMHG

## 2021-01-25 DIAGNOSIS — R80.9 NON-NEPHROTIC RANGE PROTEINURIA: ICD-10-CM

## 2021-01-25 DIAGNOSIS — N02.2 MEMBRANOUS NEPHROPATHY DETERMINED BY BIOPSY: Primary | ICD-10-CM

## 2021-01-25 DIAGNOSIS — N02.2 MEMBRANOUS NEPHROPATHY DETERMINED BY BIOPSY: ICD-10-CM

## 2021-01-25 LAB
ANION GAP SERPL CALC-SCNC: 3 MMOL/L (ref 0–18)
BILIRUB UR QL: NEGATIVE
BUN BLD-MCNC: 8 MG/DL (ref 7–18)
BUN/CREAT SERPL: 10 (ref 10–20)
CALCIUM BLD-MCNC: 9 MG/DL (ref 8.5–10.1)
CHLORIDE SERPL-SCNC: 107 MMOL/L (ref 98–112)
CLARITY UR: CLEAR
CO2 SERPL-SCNC: 29 MMOL/L (ref 21–32)
COLOR UR: YELLOW
CREAT BLD-MCNC: 0.8 MG/DL
CREAT UR-SCNC: 123 MG/DL
DEPRECATED RDW RBC AUTO: 44.6 FL (ref 35.1–46.3)
ERYTHROCYTE [DISTWIDTH] IN BLOOD BY AUTOMATED COUNT: 12.8 % (ref 11–15)
GLUCOSE BLD-MCNC: 93 MG/DL (ref 70–99)
GLUCOSE UR-MCNC: NEGATIVE MG/DL
HCT VFR BLD AUTO: 41.8 %
HGB BLD-MCNC: 13.4 G/DL
HGB UR QL STRIP.AUTO: NEGATIVE
HYALINE CASTS #/AREA URNS AUTO: 1 /LPF
KETONES UR-MCNC: NEGATIVE MG/DL
LEUKOCYTE ESTERASE UR QL STRIP.AUTO: NEGATIVE
MCH RBC QN AUTO: 30.5 PG (ref 26–34)
MCHC RBC AUTO-ENTMCNC: 32.1 G/DL (ref 31–37)
MCV RBC AUTO: 95.2 FL
MICROALBUMIN UR-MCNC: 16.2 MG/DL
MICROALBUMIN/CREAT 24H UR-RTO: 131.7 UG/MG (ref ?–30)
NITRITE UR QL STRIP.AUTO: NEGATIVE
OSMOLALITY SERPL CALC.SUM OF ELEC: 286 MOSM/KG (ref 275–295)
PATIENT FASTING Y/N/NP: NO
PH UR: 6 [PH] (ref 5–8)
PLATELET # BLD AUTO: 277 10(3)UL (ref 150–450)
POTASSIUM SERPL-SCNC: 3.7 MMOL/L (ref 3.5–5.1)
PROT UR-MCNC: 30 MG/DL
RBC # BLD AUTO: 4.39 X10(6)UL
RBC #/AREA URNS AUTO: 1 /HPF
SODIUM SERPL-SCNC: 139 MMOL/L (ref 136–145)
SP GR UR STRIP: 1.02 (ref 1–1.03)
UROBILINOGEN UR STRIP-ACNC: <2
WBC # BLD AUTO: 7.2 X10(3) UL (ref 4–11)
WBC #/AREA URNS AUTO: <1 /HPF

## 2021-01-25 PROCEDURE — 80048 BASIC METABOLIC PNL TOTAL CA: CPT

## 2021-01-25 PROCEDURE — 36415 COLL VENOUS BLD VENIPUNCTURE: CPT

## 2021-01-25 PROCEDURE — 85027 COMPLETE CBC AUTOMATED: CPT

## 2021-01-25 PROCEDURE — 81001 URINALYSIS AUTO W/SCOPE: CPT

## 2021-01-25 PROCEDURE — 82043 UR ALBUMIN QUANTITATIVE: CPT

## 2021-01-25 PROCEDURE — 82570 ASSAY OF URINE CREATININE: CPT

## 2021-01-25 PROCEDURE — 3008F BODY MASS INDEX DOCD: CPT | Performed by: INTERNAL MEDICINE

## 2021-01-25 PROCEDURE — 3074F SYST BP LT 130 MM HG: CPT | Performed by: INTERNAL MEDICINE

## 2021-01-25 PROCEDURE — 3079F DIAST BP 80-89 MM HG: CPT | Performed by: INTERNAL MEDICINE

## 2021-01-25 PROCEDURE — 99214 OFFICE O/P EST MOD 30 MIN: CPT | Performed by: INTERNAL MEDICINE

## 2021-01-25 NOTE — PROGRESS NOTES
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 who presented today for follow up    Per patient she is known to have proteinuria since 2002 and Unspecified essential hypertension    • Verruca vulgaris 07/2020    Central Pubic Area      Past Surgical History:   Procedure Laterality Date   • OTHER      KIDNEY BIOPSY   • SHOULDER ARTHROSCOPY Right 2018    Rotator cuff repair   • SHOULDER ARTHROSCOPY bruising  Integumentary: Negative for pruritus and rash  Musculoskeletal: Negative for bone/joint symptoms  Neurological: Negative for gait disturbance  Psychiatric: Negative for inappropriate interaction        01/25/21  0930   BP: 129/82   Pulse: 83 This Encounter      CBC, Platelet, No Differential [E]      Basic Metabolic Panel (8) [E]      Microalb/Creat Ratio, Random Urine      Urinalysis, Routine      Meds This Visit:  Requested Prescriptions      No prescriptions requested or ordered in this enc

## 2021-02-16 DIAGNOSIS — F98.8 ATTENTION DEFICIT DISORDER (ADD) WITHOUT HYPERACTIVITY: ICD-10-CM

## 2021-02-16 DIAGNOSIS — G47.00 INSOMNIA, UNSPECIFIED TYPE: ICD-10-CM

## 2021-02-17 RX ORDER — LOSARTAN POTASSIUM 50 MG/1
50 TABLET ORAL DAILY
Qty: 90 TABLET | Refills: 1 | Status: SHIPPED | OUTPATIENT
Start: 2021-02-17 | End: 2021-04-24

## 2021-02-17 RX ORDER — DEXTROAMPHETAMINE SACCHARATE, AMPHETAMINE ASPARTATE MONOHYDRATE, DEXTROAMPHETAMINE SULFATE AND AMPHETAMINE SULFATE 7.5; 7.5; 7.5; 7.5 MG/1; MG/1; MG/1; MG/1
30 CAPSULE, EXTENDED RELEASE ORAL DAILY
Qty: 30 CAPSULE | Refills: 0 | Status: SHIPPED | OUTPATIENT
Start: 2021-03-20 | End: 2021-04-21

## 2021-02-17 RX ORDER — TRAZODONE HYDROCHLORIDE 50 MG/1
50 TABLET ORAL NIGHTLY PRN
Qty: 30 TABLET | Refills: 0 | Status: SHIPPED | OUTPATIENT
Start: 2021-02-17 | End: 2021-03-17

## 2021-02-17 RX ORDER — DEXTROAMPHETAMINE SACCHARATE, AMPHETAMINE ASPARTATE MONOHYDRATE, DEXTROAMPHETAMINE SULFATE AND AMPHETAMINE SULFATE 7.5; 7.5; 7.5; 7.5 MG/1; MG/1; MG/1; MG/1
30 CAPSULE, EXTENDED RELEASE ORAL DAILY
Qty: 30 CAPSULE | Refills: 0 | Status: SHIPPED | OUTPATIENT
Start: 2021-04-20 | End: 2021-03-29

## 2021-02-17 RX ORDER — DEXTROAMPHETAMINE SACCHARATE, AMPHETAMINE ASPARTATE MONOHYDRATE, DEXTROAMPHETAMINE SULFATE AND AMPHETAMINE SULFATE 7.5; 7.5; 7.5; 7.5 MG/1; MG/1; MG/1; MG/1
30 CAPSULE, EXTENDED RELEASE ORAL EVERY MORNING
Qty: 30 CAPSULE | Refills: 0 | OUTPATIENT
Start: 2021-02-17 | End: 2021-03-19

## 2021-02-17 RX ORDER — DEXTROAMPHETAMINE SACCHARATE, AMPHETAMINE ASPARTATE MONOHYDRATE, DEXTROAMPHETAMINE SULFATE AND AMPHETAMINE SULFATE 7.5; 7.5; 7.5; 7.5 MG/1; MG/1; MG/1; MG/1
30 CAPSULE, EXTENDED RELEASE ORAL DAILY
Qty: 30 CAPSULE | Refills: 0 | Status: SHIPPED | OUTPATIENT
Start: 2021-02-17 | End: 2021-03-19

## 2021-02-18 RX ORDER — ALBUTEROL SULFATE 90 UG/1
AEROSOL, METERED RESPIRATORY (INHALATION)
Qty: 8.5 G | Refills: 1 | Status: SHIPPED | OUTPATIENT
Start: 2021-02-18 | End: 2021-03-18

## 2021-03-16 DIAGNOSIS — G47.00 INSOMNIA, UNSPECIFIED TYPE: ICD-10-CM

## 2021-03-17 DIAGNOSIS — L20.82 FLEXURAL ECZEMA: ICD-10-CM

## 2021-03-17 RX ORDER — TRAZODONE HYDROCHLORIDE 50 MG/1
50 TABLET ORAL NIGHTLY PRN
Qty: 30 TABLET | Refills: 0 | Status: SHIPPED | OUTPATIENT
Start: 2021-03-17

## 2021-03-18 RX ORDER — ALBUTEROL SULFATE 90 UG/1
AEROSOL, METERED RESPIRATORY (INHALATION)
Qty: 8.5 G | Refills: 1 | Status: SHIPPED | OUTPATIENT
Start: 2021-03-18 | End: 2021-04-24

## 2021-03-29 ENCOUNTER — LAB ENCOUNTER (OUTPATIENT)
Dept: LAB | Age: 41
End: 2021-03-29
Attending: FAMILY MEDICINE
Payer: COMMERCIAL

## 2021-03-29 ENCOUNTER — PATIENT MESSAGE (OUTPATIENT)
Dept: FAMILY MEDICINE CLINIC | Facility: CLINIC | Age: 41
End: 2021-03-29

## 2021-03-29 ENCOUNTER — OFFICE VISIT (OUTPATIENT)
Dept: FAMILY MEDICINE CLINIC | Facility: CLINIC | Age: 41
End: 2021-03-29
Payer: COMMERCIAL

## 2021-03-29 VITALS
SYSTOLIC BLOOD PRESSURE: 130 MMHG | WEIGHT: 108.81 LBS | BODY MASS INDEX: 20.03 KG/M2 | DIASTOLIC BLOOD PRESSURE: 87 MMHG | HEART RATE: 77 BPM | TEMPERATURE: 98 F | HEIGHT: 62 IN

## 2021-03-29 DIAGNOSIS — B35.1 ONYCHOMYCOSIS OF TOENAIL: Primary | ICD-10-CM

## 2021-03-29 DIAGNOSIS — R05.9 COUGH: ICD-10-CM

## 2021-03-29 DIAGNOSIS — J30.89 OTHER ALLERGIC RHINITIS: ICD-10-CM

## 2021-03-29 DIAGNOSIS — J45.30 MILD PERSISTENT ASTHMA, UNSPECIFIED WHETHER COMPLICATED: ICD-10-CM

## 2021-03-29 DIAGNOSIS — B35.1 ONYCHOMYCOSIS OF TOENAIL: ICD-10-CM

## 2021-03-29 PROCEDURE — 3008F BODY MASS INDEX DOCD: CPT | Performed by: FAMILY MEDICINE

## 2021-03-29 PROCEDURE — 84460 ALANINE AMINO (ALT) (SGPT): CPT

## 2021-03-29 PROCEDURE — 3075F SYST BP GE 130 - 139MM HG: CPT | Performed by: FAMILY MEDICINE

## 2021-03-29 PROCEDURE — 3079F DIAST BP 80-89 MM HG: CPT | Performed by: FAMILY MEDICINE

## 2021-03-29 PROCEDURE — 99214 OFFICE O/P EST MOD 30 MIN: CPT | Performed by: FAMILY MEDICINE

## 2021-03-29 PROCEDURE — 84450 TRANSFERASE (AST) (SGOT): CPT

## 2021-03-29 PROCEDURE — 36415 COLL VENOUS BLD VENIPUNCTURE: CPT

## 2021-03-29 RX ORDER — TERBINAFINE HYDROCHLORIDE 250 MG/1
250 TABLET ORAL DAILY
Qty: 90 TABLET | Refills: 0 | Status: SHIPPED | OUTPATIENT
Start: 2021-03-29 | End: 2021-06-27

## 2021-03-29 RX ORDER — FLUTICASONE PROPIONATE 50 MCG
2 SPRAY, SUSPENSION (ML) NASAL DAILY
Qty: 3 BOTTLE | Refills: 1 | Status: SHIPPED | OUTPATIENT
Start: 2021-03-29 | End: 2021-05-24

## 2021-03-29 RX ORDER — MONTELUKAST SODIUM 10 MG/1
10 TABLET ORAL NIGHTLY
Qty: 90 TABLET | Refills: 1 | Status: SHIPPED | OUTPATIENT
Start: 2021-03-29 | End: 2021-05-24

## 2021-03-29 NOTE — PROGRESS NOTES
Patient ID: Wandy Kohler is a 47year old female. HPI  Patient presents with:  Fungus Nails: nails and feet  Note For Work: Needs note allowing her dog to her new apartment    Last seen by me on 1/18/2021. 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 • Asthma    • Attention deficit disorder    • Extrinsic asthma, unspecified    • Kidney disease    • Unspecified essential hypertension    • Verruca vulgaris 07/2020    Central Pubic Area       Past Surgical History:   Procedure Laterality Date   • OTHER well-nourished. No distress. Head: Normocephalic. Right Ear: Tympanic membrane, external ear and ear canal normal.   Left Ear: Tympanic membrane, external ear and ear canal normal.   Nose: No rhinorrhea. Mild, pale boggy nasal mucosa bilaterally.    Thr Montelukast Sodium (SINGULAIR) 10 MG Oral Tab; Take 1 tablet (10 mg total) by mouth nightly. Referrals (if applicable)  No orders of the defined types were placed in this encounter.       Follow up if symptoms persist.  Take medicine (if given) as pr

## 2021-03-30 NOTE — TELEPHONE ENCOUNTER
From: Judy Lugo  To:  Beth Levy,   Sent: 3/29/2021 4:10 PM CDT  Subject: Test Results Question    Thank you

## 2021-04-21 RX ORDER — DEXTROAMPHETAMINE SACCHARATE, AMPHETAMINE ASPARTATE MONOHYDRATE, DEXTROAMPHETAMINE SULFATE AND AMPHETAMINE SULFATE 7.5; 7.5; 7.5; 7.5 MG/1; MG/1; MG/1; MG/1
30 CAPSULE, EXTENDED RELEASE ORAL DAILY
Qty: 30 CAPSULE | Refills: 0 | Status: SHIPPED | OUTPATIENT
Start: 2021-04-21 | End: 2021-05-21

## 2021-04-24 DIAGNOSIS — I10 ESSENTIAL HYPERTENSION: ICD-10-CM

## 2021-04-24 DIAGNOSIS — B35.1 ONYCHOMYCOSIS OF TOENAIL: ICD-10-CM

## 2021-04-24 DIAGNOSIS — R05.9 COUGH: ICD-10-CM

## 2021-04-24 DIAGNOSIS — J45.30 MILD PERSISTENT ASTHMA, UNSPECIFIED WHETHER COMPLICATED: ICD-10-CM

## 2021-04-24 DIAGNOSIS — J30.89 OTHER ALLERGIC RHINITIS: ICD-10-CM

## 2021-04-24 RX ORDER — FLUTICASONE PROPIONATE 50 MCG
2 SPRAY, SUSPENSION (ML) NASAL DAILY
Qty: 3 BOTTLE | Refills: 1 | OUTPATIENT
Start: 2021-04-24 | End: 2021-08-22

## 2021-04-24 RX ORDER — TERBINAFINE HYDROCHLORIDE 250 MG/1
250 TABLET ORAL DAILY
Qty: 90 TABLET | Refills: 0 | OUTPATIENT
Start: 2021-04-24 | End: 2021-07-23

## 2021-04-27 RX ORDER — LOSARTAN POTASSIUM 50 MG/1
50 TABLET ORAL DAILY
Qty: 90 TABLET | Refills: 1 | Status: SHIPPED | OUTPATIENT
Start: 2021-04-27 | End: 2021-09-13

## 2021-04-27 RX ORDER — ALBUTEROL SULFATE 90 UG/1
AEROSOL, METERED RESPIRATORY (INHALATION)
Qty: 8.5 G | Refills: 1 | Status: SHIPPED | OUTPATIENT
Start: 2021-04-27 | End: 2021-08-23

## 2021-05-23 ENCOUNTER — WALK IN (OUTPATIENT)
Dept: URGENT CARE | Age: 41
End: 2021-05-23

## 2021-05-23 VITALS
OXYGEN SATURATION: 97 % | SYSTOLIC BLOOD PRESSURE: 122 MMHG | DIASTOLIC BLOOD PRESSURE: 80 MMHG | BODY MASS INDEX: 36.1 KG/M2 | TEMPERATURE: 98.4 F | RESPIRATION RATE: 16 BRPM | WEIGHT: 230 LBS | HEART RATE: 79 BPM | HEIGHT: 67 IN

## 2021-05-23 DIAGNOSIS — R30.0 BURNING WITH URINATION: Primary | ICD-10-CM

## 2021-05-23 LAB
APPEARANCE, POC: ABNORMAL
B-HCG UR QL: NEGATIVE
BILIRUBIN, POC: NEGATIVE
COLOR, POC: YELLOW
GLUCOSE UR-MCNC: NEGATIVE MG/DL
INTERNAL PROCEDURAL CONTROLS ACCEPTABLE: NO
KETONES, POC: NEGATIVE MG/DL
NITRITE, POC: NEGATIVE
OCCULT BLOOD, POC: ABNORMAL
PH UR: 5.5 [PH] (ref 5–7)
PROT UR-MCNC: ABNORMAL MG/DL
SP GR UR: 1.02 (ref 1–1.03)
UROBILINOGEN UR-MCNC: 0.2 MG/DL (ref 0–1)
WBC (LEUKOCYTE) ESTERASE, POC: ABNORMAL

## 2021-05-23 PROCEDURE — 81025 URINE PREGNANCY TEST: CPT | Performed by: NURSE PRACTITIONER

## 2021-05-23 PROCEDURE — 87077 CULTURE AEROBIC IDENTIFY: CPT | Performed by: NURSE PRACTITIONER

## 2021-05-23 PROCEDURE — 81002 URINALYSIS NONAUTO W/O SCOPE: CPT | Performed by: NURSE PRACTITIONER

## 2021-05-23 PROCEDURE — 99203 OFFICE O/P NEW LOW 30 MIN: CPT | Performed by: NURSE PRACTITIONER

## 2021-05-23 PROCEDURE — 87086 URINE CULTURE/COLONY COUNT: CPT | Performed by: NURSE PRACTITIONER

## 2021-05-23 RX ORDER — LEVOTHYROXINE SODIUM 0.12 MG/1
TABLET ORAL
COMMUNITY
Start: 2020-12-09

## 2021-05-23 RX ORDER — NITROFURANTOIN 25; 75 MG/1; MG/1
100 CAPSULE ORAL 2 TIMES DAILY
Qty: 10 CAPSULE | Refills: 0 | Status: SHIPPED | OUTPATIENT
Start: 2021-05-23 | End: 2021-05-28

## 2021-05-23 ASSESSMENT — ENCOUNTER SYMPTOMS
NEUROLOGICAL NEGATIVE: 1
RESPIRATORY NEGATIVE: 1
GASTROINTESTINAL NEGATIVE: 1
CONSTITUTIONAL NEGATIVE: 1
EYES NEGATIVE: 1
PSYCHIATRIC NEGATIVE: 1

## 2021-05-24 LAB — BACTERIA UR CULT: ABNORMAL

## 2021-05-24 NOTE — PROGRESS NOTES
Patient ID: Jam Wilkerson is a 47year old female. HPI  Patient presents with:  ADD: f/u    Last seen by me on 3/29/2021. Pt works at a veterinary clinic. Pt is taking adderall 30 MG for ADD. Pt denies hyperactivity, CP, SOB and palpitations. mouth daily.  90 tablet 1   • Albuterol Sulfate  (90 Base) MCG/ACT Inhalation Aero Soln INHALE TWO PUFFS INTO THE LUNGS EVERY 4 (FOUR) HOURS AS NEEDED FOR WHEEZING OR SHORTNESS OF BREATH 8.5 g 1   • Fluticasone Propionate 50 MCG/ACT Nasal Suspension Normal mood and affect. Vitals reviewed. Assessment/Plan:      Diagnoses and all orders for this visit:    Attention deficit disorder (ADD) without hyperactivity  -     Amphetamine-Dextroamphet ER (ADDERALL XR) 30 MG Oral Capsule SR 24 Hr;  Mariam Mcleod medicine (if given) as prescribed. Approach to treatment discussed and patient/family member understands and agrees to plan. Return in about 3 months (around 8/24/2021) for adult exam.    There are no Patient Instructions on file for this visit.     Co

## 2021-05-25 ENCOUNTER — TELEPHONE (OUTPATIENT)
Dept: URGENT CARE | Age: 41
End: 2021-05-25

## 2021-06-28 DIAGNOSIS — L20.82 FLEXURAL ECZEMA: ICD-10-CM

## 2021-08-23 DIAGNOSIS — F98.8 ATTENTION DEFICIT DISORDER (ADD) WITHOUT HYPERACTIVITY: ICD-10-CM

## 2021-08-23 RX ORDER — DEXTROAMPHETAMINE SACCHARATE, AMPHETAMINE ASPARTATE MONOHYDRATE, DEXTROAMPHETAMINE SULFATE AND AMPHETAMINE SULFATE 7.5; 7.5; 7.5; 7.5 MG/1; MG/1; MG/1; MG/1
30 CAPSULE, EXTENDED RELEASE ORAL DAILY
Qty: 30 CAPSULE | Refills: 0 | Status: SHIPPED | OUTPATIENT
Start: 2021-08-23 | End: 2021-09-13

## 2021-08-23 RX ORDER — ALBUTEROL SULFATE 90 UG/1
AEROSOL, METERED RESPIRATORY (INHALATION)
Qty: 8.5 G | Refills: 1 | Status: SHIPPED | OUTPATIENT
Start: 2021-08-23 | End: 2021-10-19

## 2021-08-23 NOTE — TELEPHONE ENCOUNTER
Patient called and advised she is Very Low on Both Medications. Patient is requesting a refill on them. Patient is Scheduled for her Annual Physical on Monday 9/13. Can we please fill these for her until that appointment. Please Advise.        Please

## 2021-09-13 ENCOUNTER — OFFICE VISIT (OUTPATIENT)
Dept: FAMILY MEDICINE CLINIC | Facility: CLINIC | Age: 41
End: 2021-09-13
Payer: COMMERCIAL

## 2021-09-13 ENCOUNTER — TELEPHONE (OUTPATIENT)
Dept: FAMILY MEDICINE CLINIC | Facility: CLINIC | Age: 41
End: 2021-09-13

## 2021-09-13 ENCOUNTER — LAB ENCOUNTER (OUTPATIENT)
Dept: LAB | Age: 41
End: 2021-09-13
Attending: FAMILY MEDICINE
Payer: COMMERCIAL

## 2021-09-13 VITALS
HEART RATE: 83 BPM | SYSTOLIC BLOOD PRESSURE: 127 MMHG | HEIGHT: 62 IN | WEIGHT: 108 LBS | BODY MASS INDEX: 19.88 KG/M2 | DIASTOLIC BLOOD PRESSURE: 82 MMHG | TEMPERATURE: 99 F

## 2021-09-13 DIAGNOSIS — L20.82 FLEXURAL ECZEMA: ICD-10-CM

## 2021-09-13 DIAGNOSIS — J45.30 MILD PERSISTENT ASTHMA, UNSPECIFIED WHETHER COMPLICATED: ICD-10-CM

## 2021-09-13 DIAGNOSIS — M25.511 ACUTE PAIN OF RIGHT SHOULDER: ICD-10-CM

## 2021-09-13 DIAGNOSIS — Z00.00 ADULT GENERAL MEDICAL EXAM: ICD-10-CM

## 2021-09-13 DIAGNOSIS — Z98.890 HISTORY OF REPAIR OF RIGHT ROTATOR CUFF: ICD-10-CM

## 2021-09-13 DIAGNOSIS — F98.8 ATTENTION DEFICIT DISORDER (ADD) WITHOUT HYPERACTIVITY: ICD-10-CM

## 2021-09-13 DIAGNOSIS — R29.898 WEAKNESS OF SHOULDER: ICD-10-CM

## 2021-09-13 DIAGNOSIS — I10 ESSENTIAL HYPERTENSION: ICD-10-CM

## 2021-09-13 DIAGNOSIS — J30.89 OTHER ALLERGIC RHINITIS: ICD-10-CM

## 2021-09-13 DIAGNOSIS — Z00.00 ADULT GENERAL MEDICAL EXAM: Primary | ICD-10-CM

## 2021-09-13 PROBLEM — E55.9 VITAMIN D DEFICIENCY: Status: ACTIVE | Noted: 2021-09-13

## 2021-09-13 LAB
ALBUMIN SERPL-MCNC: 3.8 G/DL (ref 3.4–5)
ALBUMIN/GLOB SERPL: 1.2 {RATIO} (ref 1–2)
ALP LIVER SERPL-CCNC: 72 U/L
ALT SERPL-CCNC: 23 U/L
ANION GAP SERPL CALC-SCNC: 6 MMOL/L (ref 0–18)
AST SERPL-CCNC: 16 U/L (ref 15–37)
BASOPHILS # BLD AUTO: 0.11 X10(3) UL (ref 0–0.2)
BASOPHILS NFR BLD AUTO: 1.1 %
BILIRUB SERPL-MCNC: 0.2 MG/DL (ref 0.1–2)
BUN BLD-MCNC: 12 MG/DL (ref 7–18)
BUN/CREAT SERPL: 15.6 (ref 10–20)
CALCIUM BLD-MCNC: 8.8 MG/DL (ref 8.5–10.1)
CHLORIDE SERPL-SCNC: 107 MMOL/L (ref 98–112)
CHOLEST SMN-MCNC: 187 MG/DL (ref ?–200)
CO2 SERPL-SCNC: 27 MMOL/L (ref 21–32)
CREAT BLD-MCNC: 0.77 MG/DL
DEPRECATED RDW RBC AUTO: 43.7 FL (ref 35.1–46.3)
EOSINOPHIL # BLD AUTO: 0.92 X10(3) UL (ref 0–0.7)
EOSINOPHIL NFR BLD AUTO: 9 %
ERYTHROCYTE [DISTWIDTH] IN BLOOD BY AUTOMATED COUNT: 12.8 % (ref 11–15)
GLOBULIN PLAS-MCNC: 3.2 G/DL (ref 2.8–4.4)
GLUCOSE BLD-MCNC: 81 MG/DL (ref 70–99)
HCT VFR BLD AUTO: 42.4 %
HDLC SERPL-MCNC: 76 MG/DL (ref 40–59)
HGB BLD-MCNC: 13.5 G/DL
IMM GRANULOCYTES # BLD AUTO: 0.01 X10(3) UL (ref 0–1)
IMM GRANULOCYTES NFR BLD: 0.1 %
LDLC SERPL CALC-MCNC: 102 MG/DL (ref ?–100)
LYMPHOCYTES # BLD AUTO: 3.35 X10(3) UL (ref 1–4)
LYMPHOCYTES NFR BLD AUTO: 32.7 %
M PROTEIN MFR SERPL ELPH: 7 G/DL (ref 6.4–8.2)
MCH RBC QN AUTO: 30 PG (ref 26–34)
MCHC RBC AUTO-ENTMCNC: 31.8 G/DL (ref 31–37)
MCV RBC AUTO: 94.2 FL
MONOCYTES # BLD AUTO: 0.93 X10(3) UL (ref 0.1–1)
MONOCYTES NFR BLD AUTO: 9.1 %
NEUTROPHILS # BLD AUTO: 4.91 X10 (3) UL (ref 1.5–7.7)
NEUTROPHILS # BLD AUTO: 4.91 X10(3) UL (ref 1.5–7.7)
NEUTROPHILS NFR BLD AUTO: 48 %
NONHDLC SERPL-MCNC: 111 MG/DL (ref ?–130)
OSMOLALITY SERPL CALC.SUM OF ELEC: 289 MOSM/KG (ref 275–295)
PATIENT FASTING Y/N/NP: YES
PATIENT FASTING Y/N/NP: YES
PLATELET # BLD AUTO: 279 10(3)UL (ref 150–450)
POTASSIUM SERPL-SCNC: 3.8 MMOL/L (ref 3.5–5.1)
RBC # BLD AUTO: 4.5 X10(6)UL
SODIUM SERPL-SCNC: 140 MMOL/L (ref 136–145)
TRIGL SERPL-MCNC: 43 MG/DL (ref 30–149)
TSI SER-ACNC: 2.1 MIU/ML (ref 0.36–3.74)
VIT D+METAB SERPL-MCNC: 19.1 NG/ML (ref 30–100)
VLDLC SERPL CALC-MCNC: 7 MG/DL (ref 0–30)
WBC # BLD AUTO: 10.2 X10(3) UL (ref 4–11)

## 2021-09-13 PROCEDURE — 84443 ASSAY THYROID STIM HORMONE: CPT

## 2021-09-13 PROCEDURE — 3008F BODY MASS INDEX DOCD: CPT | Performed by: FAMILY MEDICINE

## 2021-09-13 PROCEDURE — 3079F DIAST BP 80-89 MM HG: CPT | Performed by: FAMILY MEDICINE

## 2021-09-13 PROCEDURE — 3074F SYST BP LT 130 MM HG: CPT | Performed by: FAMILY MEDICINE

## 2021-09-13 PROCEDURE — 36415 COLL VENOUS BLD VENIPUNCTURE: CPT

## 2021-09-13 PROCEDURE — 82306 VITAMIN D 25 HYDROXY: CPT

## 2021-09-13 PROCEDURE — 80053 COMPREHEN METABOLIC PANEL: CPT

## 2021-09-13 PROCEDURE — 99395 PREV VISIT EST AGE 18-39: CPT | Performed by: FAMILY MEDICINE

## 2021-09-13 PROCEDURE — 99213 OFFICE O/P EST LOW 20 MIN: CPT | Performed by: FAMILY MEDICINE

## 2021-09-13 PROCEDURE — 85025 COMPLETE CBC W/AUTO DIFF WBC: CPT

## 2021-09-13 PROCEDURE — 80061 LIPID PANEL: CPT

## 2021-09-13 RX ORDER — MONTELUKAST SODIUM 10 MG/1
10 TABLET ORAL NIGHTLY
Qty: 90 TABLET | Refills: 1 | Status: SHIPPED | OUTPATIENT
Start: 2021-09-13 | End: 2022-03-12

## 2021-09-13 RX ORDER — LIDOCAINE 50 MG/G
1 PATCH TOPICAL EVERY 24 HOURS
Qty: 30 PATCH | Refills: 0 | Status: SHIPPED | OUTPATIENT
Start: 2021-09-13

## 2021-09-13 RX ORDER — DEXTROAMPHETAMINE SACCHARATE, AMPHETAMINE ASPARTATE MONOHYDRATE, DEXTROAMPHETAMINE SULFATE AND AMPHETAMINE SULFATE 7.5; 7.5; 7.5; 7.5 MG/1; MG/1; MG/1; MG/1
30 CAPSULE, EXTENDED RELEASE ORAL DAILY
Qty: 30 CAPSULE | Refills: 0 | Status: SHIPPED | OUTPATIENT
Start: 2021-09-13 | End: 2021-10-19

## 2021-09-13 RX ORDER — LOSARTAN POTASSIUM 50 MG/1
50 TABLET ORAL DAILY
Qty: 90 TABLET | Refills: 1 | Status: SHIPPED | OUTPATIENT
Start: 2021-09-13

## 2021-09-13 NOTE — TELEPHONE ENCOUNTER
Prior authorization for Lidocaine Patch completed w/ [employer] on CoverMyMeds Key: [id number], turn around time 1-5 days.

## 2021-09-13 NOTE — PROGRESS NOTES
Patient ID: Sosa Castorena is a 47year old female. HPI  Patient presents with:  Routine Physical: Adult exam     Last seen by me on 5/24/2021. 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      Pt c/o right shoulder soreness when reaching b 01/25/2021    BUN 8 01/25/2021    BUNCREA 10.0 01/25/2021    CREATSERUM 0.80 01/25/2021    ANIONGAP 3 01/25/2021    GFRNAA 94 01/25/2021    GFRAA 108 01/25/2021    CA 9.0 01/25/2021    OSMOCALC 286 01/25/2021    ALKPHO 65 09/09/2019    AST 16 03/29/2021 Results   Component Value Date    MJNU87BV 42.5 03/08/2015     =======================================================    Wt Readings from Last 6 Encounters:  09/13/21 : 108 lb  05/24/21 : 104 lb 3.2 oz  03/29/21 : 108 lb 12.8 oz  01/25/21 : 111 lb  01/18/ coffee,        Occupational Exposure: Not Asked        Hobby Hazards: Not Asked        Sleep Concern: Not Asked        Stress Concern: Not Asked        Weight Concern: Not Asked        Special Diet: Not Asked        Back Care: Not Asked        Exercise: No Amphetamine-Dextroamphet ER (ADDERALL XR) 30 MG Oral Capsule SR 24 Hr Take 1 capsule (30 mg total) by mouth daily.  30 capsule 0   • Albuterol Sulfate  (90 Base) MCG/ACT Inhalation Aero Soln INHALE TWO PUFFS INTO THE LUNGS EVERY 4 (FOUR) HOURS AS NEE She is alert and oriented to person, place, and time. She has normal reflexes. No cranial nerve deficit. DTR 2/4 BLE. Skin: Skin is warm and dry. No rash noted. Psychiatric: She has a normal mood and affect.   Lower legs: No edema of the legs bilaterall topically 2 (two) times daily. Mild persistent asthma, unspecified whether complicated  -     montelukast (SINGULAIR) 10 MG Oral Tab; Take 1 tablet (10 mg total) by mouth nightly.     Other allergic rhinitis  -     montelukast (SINGULAIR) 10 MG Oral Tab; Take medicine (if given) as prescribed. Approach to treatment discussed and patient/family member understands and agrees to plan. No follow-ups on file. There are no Patient Instructions on file for this visit.     Guido Palmer    9/13/2021

## 2021-09-13 NOTE — TELEPHONE ENCOUNTER
A Prior Authorization for Lidocaine 5%    Key [id number]  Patient last name jarad  [facility] [phone]

## 2021-09-14 NOTE — TELEPHONE ENCOUNTER
Prior authorization has been denied for Lidocaine patches. Patients plan states medication is not covered due to not meeting criteria.

## 2021-09-14 NOTE — TELEPHONE ENCOUNTER
The prior authorization for the lidocaine patches was refused. You can get the 4% lidocaine patches over-the-counter as they would not cover the 5% prescription lidocaine patches.

## 2021-09-22 RX ORDER — ALBUTEROL SULFATE 90 UG/1
AEROSOL, METERED RESPIRATORY (INHALATION)
Qty: 8.5 G | Refills: 1 | OUTPATIENT
Start: 2021-09-22

## 2021-09-22 NOTE — TELEPHONE ENCOUNTER
Duplicate request, previously addressed. Has one refill. Albuterol Sulfate  (90 Base) MCG/ACT Inhalation Aero Soln 8.5 g 1 8/23/2021     Sig: INHALE TWO PUFFS INTO THE LUNGS EVERY 4 (FOUR) HOURS AS NEEDED FOR WHEEZING OR SHORTNESS OF BREATH    Sent to pharmacy as:  Albuterol Sulfate  (90 Base) MCG/ACT Inhalation Aerosol Solution    E-Prescribing Status: Receipt confirmed by pharmacy (8/23/2021  9:27 PM CDT)        Order Questions

## 2021-10-04 ENCOUNTER — OFFICE VISIT (OUTPATIENT)
Dept: FAMILY MEDICINE CLINIC | Facility: CLINIC | Age: 41
End: 2021-10-04
Payer: COMMERCIAL

## 2021-10-04 VITALS
HEART RATE: 88 BPM | SYSTOLIC BLOOD PRESSURE: 131 MMHG | TEMPERATURE: 99 F | HEIGHT: 62 IN | DIASTOLIC BLOOD PRESSURE: 88 MMHG | BODY MASS INDEX: 19.51 KG/M2 | WEIGHT: 106 LBS

## 2021-10-04 DIAGNOSIS — Z01.419 ENCOUNTER FOR GYNECOLOGICAL EXAMINATION: Primary | ICD-10-CM

## 2021-10-04 DIAGNOSIS — L98.9 SKIN LESION: ICD-10-CM

## 2021-10-04 PROCEDURE — 90471 IMMUNIZATION ADMIN: CPT | Performed by: FAMILY MEDICINE

## 2021-10-04 PROCEDURE — 3075F SYST BP GE 130 - 139MM HG: CPT | Performed by: FAMILY MEDICINE

## 2021-10-04 PROCEDURE — 3079F DIAST BP 80-89 MM HG: CPT | Performed by: FAMILY MEDICINE

## 2021-10-04 PROCEDURE — 3008F BODY MASS INDEX DOCD: CPT | Performed by: FAMILY MEDICINE

## 2021-10-04 PROCEDURE — 90686 IIV4 VACC NO PRSV 0.5 ML IM: CPT | Performed by: FAMILY MEDICINE

## 2021-10-04 PROCEDURE — 99395 PREV VISIT EST AGE 18-39: CPT | Performed by: FAMILY MEDICINE

## 2021-10-04 NOTE — PROGRESS NOTES
HPI:    Patient ID: Kathi Loving is a 47year old female. HPI  Patient presents with:  Gyn Exam    Last physical 9/13/21  Pap last year was normal.  No prior abnormal Pap smears. Patient is monogamous in her relationship. She is in condoms.     Marcela Zepeda Past Medical History:   Diagnosis Date   • Asthma    • Attention deficit disorder    • Extrinsic asthma, unspecified    • Kidney disease    • Unspecified essential hypertension    • Verruca vulgaris 07/2020    Central Pubic Area     Past Surgical Histo file  Transportation Needs:       Lack of Transportation (Medical): Not on file      Lack of Transportation (Non-Medical):  Not on file  Physical Activity:       Days of Exercise per Week: Not on file      Minutes of Exercise per Session: Not on file  Omid Medications   Medication Sig Dispense Refill   • losartan Potassium 50 MG Oral Tab Take 1 tablet (50 mg total) by mouth daily.  90 tablet 1   • Amphetamine-Dextroamphet ER (ADDERALL XR) 30 MG Oral Capsule SR 24 Hr Take 1 capsule (30 mg total) by mouth daily lesion or injury. Left: No rash, tenderness, lesion or injury. Vagina: Normal. No vaginal discharge. Cervix: No cervical motion tenderness, discharge or friability. Adnexa:         Right: No mass, tenderness or fullness.           Lilliam Cooper

## 2021-10-18 ENCOUNTER — OFFICE VISIT (OUTPATIENT)
Dept: NEPHROLOGY | Facility: CLINIC | Age: 41
End: 2021-10-18
Payer: COMMERCIAL

## 2021-10-18 VITALS
BODY MASS INDEX: 19.69 KG/M2 | HEIGHT: 62 IN | TEMPERATURE: 98 F | SYSTOLIC BLOOD PRESSURE: 135 MMHG | DIASTOLIC BLOOD PRESSURE: 81 MMHG | WEIGHT: 107 LBS | HEART RATE: 78 BPM

## 2021-10-18 DIAGNOSIS — F98.8 ATTENTION DEFICIT DISORDER (ADD) WITHOUT HYPERACTIVITY: ICD-10-CM

## 2021-10-18 DIAGNOSIS — R80.9 NON-NEPHROTIC RANGE PROTEINURIA: ICD-10-CM

## 2021-10-18 DIAGNOSIS — N02.2 MEMBRANOUS NEPHROPATHY DETERMINED BY BIOPSY: Primary | ICD-10-CM

## 2021-10-18 PROCEDURE — 3075F SYST BP GE 130 - 139MM HG: CPT | Performed by: INTERNAL MEDICINE

## 2021-10-18 PROCEDURE — 3079F DIAST BP 80-89 MM HG: CPT | Performed by: INTERNAL MEDICINE

## 2021-10-18 PROCEDURE — 99214 OFFICE O/P EST MOD 30 MIN: CPT | Performed by: INTERNAL MEDICINE

## 2021-10-18 PROCEDURE — 3008F BODY MASS INDEX DOCD: CPT | Performed by: INTERNAL MEDICINE

## 2021-10-18 NOTE — PROGRESS NOTES
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 who presented today for follow up    Per patient she is known to have proteinuria since 2002 and Central Pubic Area      Past Surgical History:   Procedure Laterality Date   • OTHER      KIDNEY BIOPSY   • SHOULDER ARTHROSCOPY Right 2018    Rotator cuff repair           Medications (Active prior to today's visit):  Current Outpatient Medications   M hematuria  Endocrine: Negative for abnormal sleep patterns, increased activity  Hema/Lymph: Negative for easy bleeding and easy bruising  Integumentary: Negative for pruritus and rash  Musculoskeletal: Negative for bone/joint symptoms  Neurological: Tor Stamp given proteinuria and currently at goal  - off of enalapril for cough and on losartan 50 mg   - cont. current treatment     Follow up in 9 months and if remain stable will follow up as needed    Lab test in next 1-2 weeks     Orders This Visit:  Orders Plac

## 2021-10-19 RX ORDER — ALBUTEROL SULFATE 90 UG/1
AEROSOL, METERED RESPIRATORY (INHALATION)
Qty: 8.5 G | Refills: 1 | Status: SHIPPED | OUTPATIENT
Start: 2021-10-19

## 2021-10-19 RX ORDER — DEXTROAMPHETAMINE SACCHARATE, AMPHETAMINE ASPARTATE MONOHYDRATE, DEXTROAMPHETAMINE SULFATE AND AMPHETAMINE SULFATE 7.5; 7.5; 7.5; 7.5 MG/1; MG/1; MG/1; MG/1
30 CAPSULE, EXTENDED RELEASE ORAL DAILY
Qty: 30 CAPSULE | Refills: 0 | Status: SHIPPED | OUTPATIENT
Start: 2021-10-19 | End: 2021-11-18

## 2021-11-15 ENCOUNTER — TELEPHONE (OUTPATIENT)
Dept: FAMILY MEDICINE CLINIC | Facility: CLINIC | Age: 41
End: 2021-11-15

## 2021-11-15 DIAGNOSIS — F98.8 ATTENTION DEFICIT DISORDER (ADD) WITHOUT HYPERACTIVITY: ICD-10-CM

## 2021-11-15 RX ORDER — DEXTROAMPHETAMINE SACCHARATE, AMPHETAMINE ASPARTATE MONOHYDRATE, DEXTROAMPHETAMINE SULFATE AND AMPHETAMINE SULFATE 7.5; 7.5; 7.5; 7.5 MG/1; MG/1; MG/1; MG/1
30 CAPSULE, EXTENDED RELEASE ORAL DAILY
Qty: 30 CAPSULE | Refills: 0 | Status: SHIPPED | OUTPATIENT
Start: 2021-11-15 | End: 2021-12-15

## 2021-11-15 RX ORDER — DEXTROAMPHETAMINE SACCHARATE, AMPHETAMINE ASPARTATE MONOHYDRATE, DEXTROAMPHETAMINE SULFATE AND AMPHETAMINE SULFATE 7.5; 7.5; 7.5; 7.5 MG/1; MG/1; MG/1; MG/1
30 CAPSULE, EXTENDED RELEASE ORAL DAILY
Qty: 30 CAPSULE | Refills: 0 | Status: SHIPPED | OUTPATIENT
Start: 2021-12-16 | End: 2022-01-15

## 2021-11-15 RX ORDER — DEXTROAMPHETAMINE SACCHARATE, AMPHETAMINE ASPARTATE MONOHYDRATE, DEXTROAMPHETAMINE SULFATE AND AMPHETAMINE SULFATE 7.5; 7.5; 7.5; 7.5 MG/1; MG/1; MG/1; MG/1
30 CAPSULE, EXTENDED RELEASE ORAL DAILY
Qty: 30 CAPSULE | Refills: 0 | Status: SHIPPED | OUTPATIENT
Start: 2022-01-16 | End: 2022-02-15

## 2021-11-15 NOTE — TELEPHONE ENCOUNTER
Patient requesting a call to see if she needs to schedule an appointment for any future refills on her medication.

## 2021-11-16 RX ORDER — DEXTROAMPHETAMINE SACCHARATE, AMPHETAMINE ASPARTATE MONOHYDRATE, DEXTROAMPHETAMINE SULFATE AND AMPHETAMINE SULFATE 7.5; 7.5; 7.5; 7.5 MG/1; MG/1; MG/1; MG/1
30 CAPSULE, EXTENDED RELEASE ORAL DAILY
Qty: 30 CAPSULE | Refills: 0 | OUTPATIENT
Start: 2021-11-16 | End: 2021-12-16

## 2021-11-22 ENCOUNTER — OFFICE VISIT (OUTPATIENT)
Dept: DERMATOLOGY CLINIC | Facility: CLINIC | Age: 41
End: 2021-11-22
Payer: COMMERCIAL

## 2021-11-22 DIAGNOSIS — D48.5 NEOPLASM OF UNCERTAIN BEHAVIOR OF SKIN: Primary | ICD-10-CM

## 2021-11-22 DIAGNOSIS — D23.9 BENIGN NEOPLASM OF SKIN, UNSPECIFIED LOCATION: ICD-10-CM

## 2021-11-22 PROCEDURE — 88305 TISSUE EXAM BY PATHOLOGIST: CPT | Performed by: DERMATOLOGY

## 2021-11-22 PROCEDURE — 11102 TANGNTL BX SKIN SINGLE LES: CPT | Performed by: DERMATOLOGY

## 2021-11-22 PROCEDURE — 99213 OFFICE O/P EST LOW 20 MIN: CPT | Performed by: DERMATOLOGY

## 2021-11-22 RX ORDER — FLUTICASONE PROPIONATE 50 MCG
2 SPRAY, SUSPENSION (ML) NASAL DAILY
COMMUNITY
Start: 2021-11-16

## 2021-11-22 RX ORDER — IMIQUIMOD 12.5 MG/.25G
CREAM TOPICAL
Qty: 12 EACH | Refills: 0 | Status: SHIPPED | OUTPATIENT
Start: 2021-11-22

## 2021-11-27 NOTE — PROGRESS NOTES
The pathology report from last visit showed central lower abdomen Verruca vulgaris. Please log in test results, send biopsy results letter. Pt to rtc 1 year or prn.

## 2021-12-05 NOTE — PROGRESS NOTES
Jeffrey Keane is a 47year old female. HPI:     CC:  Patient presents with:  Verruca: LOV 7/29/20. pt presenting today with Verruca f/u to groin area. Denies pain or itching. previously BX'd and patient states new lesions has appeared.  Dneies pain or i once a week. 12 capsule 1   • Collagen 500 MG Oral Cap Take by mouth. • Cholecalciferol (VITAMIN D-3) 5000 UNITS Oral Tab Take 1 tablet by mouth. • fluticasone propionate 50 MCG/ACT Nasal Suspension 2 sprays by Each Nare route daily.  (Patient not t Smoking status: Former Smoker        Packs/day: 1.00        Years: 3.00        Pack years: 3        Types: Cigarettes      Smokeless tobacco: Never Used    Vaping Use      Vaping Use: Never used    Substance and Sexual Activity      Alcohol use:  Yes including pathology and past body maps reviewed. Updated and new information noted in current visit. Patient presents with concerns above. Patient has been in their usual state of health. History, medications, allergies reviewed as noted.       ROS: likely spread from shaving in this location    No recurrence of larger verrucous lesion superior to this  Other rare benign-appearing nevi no suspicious lesions    Please refer to map for specific lesions. See additional diagnoses.   Pros cons of various t

## 2021-12-05 NOTE — PROGRESS NOTES
Operative Report                     Shave/  Tangential biopsy     Clinical diagnosis:    Size of lesion:    Location:pt with new tender lesion near prior wart  Spec 1 Description >>>>>: central lower abdomen  Spec 1 Comment: verruca r/o other 5mm ne

## 2021-12-17 DIAGNOSIS — L20.82 FLEXURAL ECZEMA: ICD-10-CM

## 2021-12-27 ENCOUNTER — OFFICE VISIT (OUTPATIENT)
Dept: FAMILY MEDICINE CLINIC | Facility: CLINIC | Age: 41
End: 2021-12-27
Payer: COMMERCIAL

## 2021-12-27 VITALS
BODY MASS INDEX: 20.39 KG/M2 | SYSTOLIC BLOOD PRESSURE: 128 MMHG | TEMPERATURE: 97 F | WEIGHT: 110.81 LBS | HEIGHT: 62 IN | HEART RATE: 72 BPM | DIASTOLIC BLOOD PRESSURE: 79 MMHG

## 2021-12-27 DIAGNOSIS — G43.109 MIGRAINE WITH AURA AND WITHOUT STATUS MIGRAINOSUS, NOT INTRACTABLE: Primary | ICD-10-CM

## 2021-12-27 DIAGNOSIS — F98.8 ATTENTION DEFICIT DISORDER (ADD) WITHOUT HYPERACTIVITY: ICD-10-CM

## 2021-12-27 PROCEDURE — 99214 OFFICE O/P EST MOD 30 MIN: CPT | Performed by: FAMILY MEDICINE

## 2021-12-27 PROCEDURE — 3074F SYST BP LT 130 MM HG: CPT | Performed by: FAMILY MEDICINE

## 2021-12-27 PROCEDURE — 3008F BODY MASS INDEX DOCD: CPT | Performed by: FAMILY MEDICINE

## 2021-12-27 PROCEDURE — 3078F DIAST BP <80 MM HG: CPT | Performed by: FAMILY MEDICINE

## 2021-12-27 RX ORDER — UBIDECARENONE 75 MG
250 CAPSULE ORAL DAILY
COMMUNITY

## 2021-12-27 NOTE — PROGRESS NOTES
Patient ID: Ilya La is a 46year old female. HPI  Patient presents with:  Medication Follow-Up  Migraine: w/ aura      Last seen by me on 9/13/2021.      Pt c/o migraine with aura which she has about once or twice per month that cause her to le hypertension    • Verruca vulgaris 07/2020    Central Pubic Area       Past Surgical History:   Procedure Laterality Date   • OTHER      KIDNEY BIOPSY   • SHOULDER ARTHROSCOPY Right 2018    Rotator cuff repair          Current Outpatient Medications   Medi taking: No sig reported) 12 each 0     Allergies:No Known Allergies     Physical Exam:       Physical Exam  Blood pressure 128/79, pulse 72, temperature 97.2 °F (36.2 °C), temperature source Temporal, height 5' 2\" (1.575 m), weight 110 lb 12.8 oz, last me presence of Pj Donald DO. Electronically Signed: Eri Gabriel, 12/27/2021, 8:36 AM.     I, Pj Donald DO,  personally performed the services described in this documentation.  All medical record entries made by the scribe were at my direction

## 2022-01-13 DIAGNOSIS — E55.9 VITAMIN D DEFICIENCY: ICD-10-CM

## 2022-01-13 RX ORDER — ERGOCALCIFEROL 1.25 MG/1
50000 CAPSULE ORAL WEEKLY
Qty: 12 CAPSULE | Refills: 1 | Status: SHIPPED | OUTPATIENT
Start: 2022-01-13 | End: 2022-04-13

## 2022-03-09 DIAGNOSIS — I10 ESSENTIAL HYPERTENSION: ICD-10-CM

## 2022-03-09 RX ORDER — LOSARTAN POTASSIUM 50 MG/1
50 TABLET ORAL DAILY
Qty: 90 TABLET | Refills: 1 | Status: SHIPPED | OUTPATIENT
Start: 2022-03-09 | End: 2022-08-01

## 2022-03-09 NOTE — TELEPHONE ENCOUNTER
Refill passed per [address] protocol. Requested Prescriptions   Pending Prescriptions Disp Refills    LOSARTAN 50 MG Oral Tab [Pharmacy Med Name: LOSARTAN POTASSIUM 50 MG ORAL TABLET] 90 tablet 1     Sig: Take 1 tablet (50 mg total) by mouth daily.         Hypertensive Medications Protocol Passed - 3/9/2022 10:28 AM        Passed - CMP or BMP in past 12 months        Passed - Appointment in past 6 or next 3 months        Passed - GFR Non- > 50     Lab Results   Component Value Date    GFRNAA 98 09/13/2021                       Recent Outpatient Visits              2 months ago Migraine with aura and without status migrainosus, not intractable    [address], [place], [place]    Office Visit    3 months ago Neoplasm of uncertain behavior of skin    [facility] Elly Martinez MD    Office Visit    4 months ago Membranous nephropathy 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    Maddie Marion, Amira Coles MD    Office Visit    5 months ago Encounter for gynecological examination    [address], [address] MD    Office Visit    5 months ago Adult general medical exam    [address], [address], [address], [place], DO    Office Visit

## 2022-03-16 NOTE — TELEPHONE ENCOUNTER
Please review. Protocol failed / No protocol. Requested Prescriptions   Pending Prescriptions Disp Refills    TRIAMCINOLONE 0.1 % External Ointment [Pharmacy Med Name: TRIAMCINOLONE ACETONIDE 0.1 % EXTERNAL OINTMENT] 30 g 0     Sig: APPLY 1 APPLICATION TOPICALLY 2 (TWO) TIMES DAILY.         There is no refill protocol information for this order          Recent Outpatient Visits              2 months ago Migraine with aura and without status migrainosus, not intractable    [address], [address], [facility]    Office Visit    3 months ago Neoplasm of uncertain behavior of skin    [facility] Kyler Dawson MD    Office Visit    4 months ago Membranous nephropathy 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    Maddie Smart Jesslyn Ny, MD    Office Visit    5 months ago Encounter for gynecological examination    Danitza Tejeda, Rosamaria Emmanuel MD    Office Visit    6 months ago Adult general medical exam    [address], [address], [place], [place]    Office Visit

## 2022-04-12 ENCOUNTER — TELEPHONE (OUTPATIENT)
Dept: FAMILY MEDICINE CLINIC | Facility: CLINIC | Age: 42
End: 2022-04-12

## 2022-04-12 NOTE — TELEPHONE ENCOUNTER
Pt stated her pharmacy advised her Adderall XR 30 mg is on back order asking if she can take the regular Adderall  30 mg   Stated she will be out in a week

## 2022-04-13 RX ORDER — DEXTROAMPHETAMINE SACCHARATE, AMPHETAMINE ASPARTATE, DEXTROAMPHETAMINE SULFATE AND AMPHETAMINE SULFATE 7.5; 7.5; 7.5; 7.5 MG/1; MG/1; MG/1; MG/1
30 TABLET ORAL DAILY
Qty: 30 TABLET | Refills: 0 | Status: CANCELLED | OUTPATIENT
Start: 2022-04-13

## 2022-04-13 RX ORDER — DEXTROAMPHETAMINE SACCHARATE, AMPHETAMINE ASPARTATE MONOHYDRATE, DEXTROAMPHETAMINE SULFATE AND AMPHETAMINE SULFATE 7.5; 7.5; 7.5; 7.5 MG/1; MG/1; MG/1; MG/1
30 CAPSULE, EXTENDED RELEASE ORAL DAILY
Qty: 30 CAPSULE | Refills: 0 | Status: SHIPPED | OUTPATIENT
Start: 2022-04-13 | End: 2022-05-13

## 2022-04-13 NOTE — TELEPHONE ENCOUNTER
Adderall XR sent to the pharmacy but because it is a controlled substance I do need to do a video visit with her as its been more than 3 months now. If she wants to do an office visit that is fine as well for follow-up for the Adderall.

## 2022-04-13 NOTE — TELEPHONE ENCOUNTER
Non urgent if patient still has a week supply. May way for response of Dr Ying Porter who will be back in the office tomorrow.

## 2022-04-13 NOTE — TELEPHONE ENCOUNTER
Patient called to follow up on message below. Patent requesting return call today 4/13 to discuss medication.        amphetamine-dextroamphetamine (ADDERALL XR) 30 MG Oral Capsule SR 24 Hr

## 2022-04-14 NOTE — TELEPHONE ENCOUNTER
Spoke with patient, (  verified ) informed of 's  instructions below    Has appt for f/u     Future Appointments   Date Time Provider Mackenzie Chatterjeei   2022 10:45 AM Matilde Bauer,  [facility]     Patient scheduled for video visit. Patient advised to complete the e-check in in [employer], if active. Understands to follow the prompts and links to complete the visit. Patient advised that there may be a co-pay involved in this type of visit. Patient agreed to proceed, they understand the provider may be calling from a blocked, or unknown phone number on their caller ID and they know to answer the phone.     Best call back:  [phone]

## 2022-04-14 NOTE — TELEPHONE ENCOUNTER
Dr. Naomi Galvez - patient upset, and out of medication. Wants IR due to XR on backorder. RN - Patient requesting call back when completed. Patient called office. Patient's date of birth and full name both confirmed. She's very upset. States she needs this for work and is out of medication. Asking for Immediate Release Adderall - 30mg IR. [facility]: XR Adderall on Back-order. Patient does not want to use another pharmacy. Asking for Immediate Release instead.      Previously, Appointment made for next week as advised:     Future Appointments   Date Time Provider Mackenzie Stallings   4/25/2022 10:45 AM Neymar Max DO [facility]

## 2022-04-14 NOTE — TELEPHONE ENCOUNTER
Medication refill    Coni Salazra DO 59 minutes ago (1:13 PM)           I need a new prescription for the Adderall 30mg NOT EXTENDED RELEASE. CAUSE THE EXTENDED RELEASE IS ON BACK ORDER PER [facility]. GRABIEL BEEN CALLING SINCE TUESDAY APRIL12, 2022 I JUST NEED ADERRAll without the extend release. The nurses took notes I explained the situation. [facility] does carry regular aderrall 30mg. Can you ok the prescription for 30mg adderall without the extend release. And do I have to wait a hole day again to hear from anyone since this has been going on since Tuesday?  My number [phone]

## 2022-04-15 RX ORDER — DEXTROAMPHETAMINE SACCHARATE, AMPHETAMINE ASPARTATE, DEXTROAMPHETAMINE SULFATE AND AMPHETAMINE SULFATE 7.5; 7.5; 7.5; 7.5 MG/1; MG/1; MG/1; MG/1
30 TABLET ORAL DAILY
Qty: 30 TABLET | Refills: 0 | Status: SHIPPED | OUTPATIENT
Start: 2022-04-15 | End: 2022-05-15

## 2022-04-15 RX ORDER — DEXTROAMPHETAMINE SACCHARATE, AMPHETAMINE ASPARTATE, DEXTROAMPHETAMINE SULFATE AND AMPHETAMINE SULFATE 7.5; 7.5; 7.5; 7.5 MG/1; MG/1; MG/1; MG/1
30 TABLET ORAL DAILY
Qty: 30 TABLET | Refills: 0 | Status: SHIPPED | OUTPATIENT
Start: 2022-05-15 | End: 2022-06-14

## 2022-04-15 RX ORDER — DEXTROAMPHETAMINE SACCHARATE, AMPHETAMINE ASPARTATE, DEXTROAMPHETAMINE SULFATE AND AMPHETAMINE SULFATE 7.5; 7.5; 7.5; 7.5 MG/1; MG/1; MG/1; MG/1
30 TABLET ORAL DAILY
Qty: 30 TABLET | Refills: 0 | Status: SHIPPED | OUTPATIENT
Start: 2022-06-15 | End: 2022-07-15

## 2022-05-31 DIAGNOSIS — F98.8 ATTENTION DEFICIT DISORDER (ADD) WITHOUT HYPERACTIVITY: ICD-10-CM

## 2022-05-31 RX ORDER — DEXTROAMPHETAMINE SACCHARATE, AMPHETAMINE ASPARTATE MONOHYDRATE, DEXTROAMPHETAMINE SULFATE AND AMPHETAMINE SULFATE 7.5; 7.5; 7.5; 7.5 MG/1; MG/1; MG/1; MG/1
30 CAPSULE, EXTENDED RELEASE ORAL DAILY
Qty: 30 CAPSULE | Refills: 0 | Status: SHIPPED | OUTPATIENT
Start: 2022-05-31 | End: 2022-06-30

## 2022-06-29 DIAGNOSIS — L20.82 FLEXURAL ECZEMA: ICD-10-CM

## 2022-06-29 DIAGNOSIS — F98.8 ATTENTION DEFICIT DISORDER (ADD) WITHOUT HYPERACTIVITY: ICD-10-CM

## 2022-06-29 RX ORDER — DEXTROAMPHETAMINE SACCHARATE, AMPHETAMINE ASPARTATE MONOHYDRATE, DEXTROAMPHETAMINE SULFATE AND AMPHETAMINE SULFATE 7.5; 7.5; 7.5; 7.5 MG/1; MG/1; MG/1; MG/1
30 CAPSULE, EXTENDED RELEASE ORAL DAILY
Qty: 30 CAPSULE | Refills: 0 | Status: SHIPPED | OUTPATIENT
Start: 2022-06-29 | End: 2022-07-29

## 2022-07-30 DIAGNOSIS — I10 ESSENTIAL HYPERTENSION: ICD-10-CM

## 2022-07-30 DIAGNOSIS — F98.8 ATTENTION DEFICIT DISORDER (ADD) WITHOUT HYPERACTIVITY: ICD-10-CM

## 2022-07-31 RX ORDER — DEXTROAMPHETAMINE SACCHARATE, AMPHETAMINE ASPARTATE MONOHYDRATE, DEXTROAMPHETAMINE SULFATE AND AMPHETAMINE SULFATE 7.5; 7.5; 7.5; 7.5 MG/1; MG/1; MG/1; MG/1
30 CAPSULE, EXTENDED RELEASE ORAL DAILY
Qty: 30 CAPSULE | Refills: 0 | Status: SHIPPED | OUTPATIENT
Start: 2022-07-31 | End: 2022-08-30

## 2022-08-01 RX ORDER — LOSARTAN POTASSIUM 50 MG/1
50 TABLET ORAL DAILY
Qty: 90 TABLET | Refills: 0 | Status: SHIPPED | OUTPATIENT
Start: 2022-08-01

## 2022-08-01 NOTE — TELEPHONE ENCOUNTER
Pt calling to f/up on getting refills for Adderrall and Losartan med. Pt. Did sched video visit appt. For 8/19/2022, which is 1st avail appt. For Dr Shane Teresa. Pharm only received refill for Adderall. Please send refill for Losartan med.

## 2022-08-01 NOTE — TELEPHONE ENCOUNTER
Refilled times 1 but needs appointment before the next prescription will be filled. Please call patient and set up an office visit as overdue. Make video visit for ADD follow-up.

## 2022-08-01 NOTE — TELEPHONE ENCOUNTER
DR Max=see Kristi's note below, pended losartan for approval,thanks       Future Appointments   Date Time Provider Mackenzie Stallings   8/19/2022 10:45 AM [facility]

## 2022-08-26 DIAGNOSIS — F98.8 ATTENTION DEFICIT DISORDER (ADD) WITHOUT HYPERACTIVITY: ICD-10-CM

## 2022-08-27 ENCOUNTER — TELEPHONE (OUTPATIENT)
Dept: FAMILY MEDICINE CLINIC | Facility: CLINIC | Age: 42
End: 2022-08-27

## 2022-08-27 RX ORDER — DEXTROAMPHETAMINE SACCHARATE, AMPHETAMINE ASPARTATE MONOHYDRATE, DEXTROAMPHETAMINE SULFATE AND AMPHETAMINE SULFATE 7.5; 7.5; 7.5; 7.5 MG/1; MG/1; MG/1; MG/1
30 CAPSULE, EXTENDED RELEASE ORAL DAILY
Qty: 30 CAPSULE | Refills: 0 | OUTPATIENT
Start: 2022-08-27 | End: 2022-09-26

## 2022-08-27 RX ORDER — DEXTROAMPHETAMINE SACCHARATE, AMPHETAMINE ASPARTATE MONOHYDRATE, DEXTROAMPHETAMINE SULFATE AND AMPHETAMINE SULFATE 7.5; 7.5; 7.5; 7.5 MG/1; MG/1; MG/1; MG/1
30 CAPSULE, EXTENDED RELEASE ORAL DAILY
Qty: 30 CAPSULE | Refills: 0 | Status: SHIPPED | OUTPATIENT
Start: 2022-10-28 | End: 2022-11-23

## 2022-08-27 RX ORDER — DEXTROAMPHETAMINE SACCHARATE, AMPHETAMINE ASPARTATE MONOHYDRATE, DEXTROAMPHETAMINE SULFATE AND AMPHETAMINE SULFATE 7.5; 7.5; 7.5; 7.5 MG/1; MG/1; MG/1; MG/1
30 CAPSULE, EXTENDED RELEASE ORAL DAILY
Qty: 30 CAPSULE | Refills: 0 | Status: SHIPPED | OUTPATIENT
Start: 2022-08-27 | End: 2022-09-26

## 2022-08-27 RX ORDER — DEXTROAMPHETAMINE SACCHARATE, AMPHETAMINE ASPARTATE MONOHYDRATE, DEXTROAMPHETAMINE SULFATE AND AMPHETAMINE SULFATE 7.5; 7.5; 7.5; 7.5 MG/1; MG/1; MG/1; MG/1
30 CAPSULE, EXTENDED RELEASE ORAL DAILY
Qty: 30 CAPSULE | Refills: 0 | Status: SHIPPED | OUTPATIENT
Start: 2022-09-27 | End: 2022-10-27

## 2022-08-27 NOTE — TELEPHONE ENCOUNTER
Please review. Protocol failed / No Protocol. She still has about a week left and when she has 4 to 5 days left of medication she will send me a [employer] message and we will send in 3 months worth. She should then see me in about 3 months for a full physical exam.    Requested Prescriptions   Pending Prescriptions Disp Refills    AMPHETAMINE-DEXTROAMPHETAMINE ER 30 MG Oral Capsule SR 24 Hr [Pharmacy Med Name: AMPHETAMINE-DEXTROAMPHET ER 30 MG ORAL CAPSULE EXTENDED RELEASE 24 HOUR] 30 capsule 0     Sig: Take 1 capsule (30 mg total) by mouth daily.         There is no refill protocol information for this order            Recent Outpatient Visits              1 week ago Attention deficit disorder (ADD) without hyperactivity    [facility], [address], [facility],     Telemedicine    4 months ago Attention deficit disorder (ADD) without hyperactivity    [facility], [address], [facility],     Telemedicine    8 months ago Migraine with aura and without status migrainosus, not intractable    [facility], [address], [address], [place], DO    Office Visit    9 months ago Neoplasm of uncertain behavior of skin    [facility] Marybelle Najjar, MD    Office Visit    10 months ago Membranous nephropathy 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    Maddie Wall, Angle Barajas MD    Office Visit

## 2022-10-30 RX ORDER — ALBUTEROL SULFATE 90 UG/1
AEROSOL, METERED RESPIRATORY (INHALATION)
Qty: 8.5 G | Refills: 1 | Status: SHIPPED | OUTPATIENT
Start: 2022-10-30

## 2022-11-21 NOTE — TELEPHONE ENCOUNTER
Telehealth visit 8/19. Please advise on refill controlled substance    Requested Prescriptions     Pending Prescriptions Disp Refills    AMPHETAMINE-DEXTROAMPHETAMINE ER 30 MG Oral Capsule SR 24 Hr [Pharmacy Med Name: AMPHETAMINE-DEXTROAMPHET ER 30 MG ORAL CAPSULE EXTENDED RELEASE 24 HOUR] 30 capsule 0     Sig: TAKE 1 CAPSULE (30 MG TOTAL) BY MOUTH DAILY. (10/27/22)      Recent Visits  Date Type Provider Dept   12/27/21 Office Visit Felton Dumont DO [facility]   09/13/21 Office Visit Felton Dumont DO [facility]   Showing recent visits within past 540 days with a meds authorizing provider and meeting all other requirements  Future Appointments  No visits were found meeting these conditions.   Showing future appointments within next 150 days with a meds authorizing provider and meeting all other requirements     Requested Prescriptions   Pending Prescriptions Disp Refills    AMPHETAMINE-DEXTROAMPHETAMINE ER 30 MG Oral Capsule SR 24 Hr [Pharmacy Med Name: AMPHETAMINE-DEXTROAMPHET ER 30 MG ORAL CAPSULE EXTENDED RELEASE 24 HOUR] 30 capsule 0     Sig: TAKE 1 CAPSULE (30 MG TOTAL) BY MOUTH DAILY. (10/27/22)       There is no refill protocol information for this order            Recent Outpatient Visits              3 months ago Attention deficit disorder (ADD) without hyperactivity    [address], [facility]    Telemedicine    7 months ago Attention deficit disorder (ADD) without hyperactivity    [address], [facility]    Telemedicine    10 months ago Migraine with aura and without status migrainosus, not intractable    [address], [address], [place],     Office Visit    12 months ago Neoplasm of uncertain behavior of skin    [facility] [place] [facility] Will Hill MD    Office Visit    1 year ago Membranous nephropathy 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    [address], [address] [facility], [facility], [place], [place]    Office Visit

## 2022-11-23 RX ORDER — DEXTROAMPHETAMINE SACCHARATE, AMPHETAMINE ASPARTATE MONOHYDRATE, DEXTROAMPHETAMINE SULFATE AND AMPHETAMINE SULFATE 7.5; 7.5; 7.5; 7.5 MG/1; MG/1; MG/1; MG/1
CAPSULE, EXTENDED RELEASE ORAL
Qty: 30 CAPSULE | Refills: 0 | Status: SHIPPED | OUTPATIENT
Start: 2022-11-23

## 2023-01-19 RX ORDER — ALBUTEROL SULFATE 90 UG/1
AEROSOL, METERED RESPIRATORY (INHALATION)
Qty: 8.5 G | Refills: 3 | Status: SHIPPED | OUTPATIENT
Start: 2023-01-19

## 2023-01-19 NOTE — TELEPHONE ENCOUNTER
Refill passed per [place] [facility], [facility] protocol.       Requested Prescriptions   Pending Prescriptions Disp Refills    ALBUTEROL 108 (90 Base) MCG/ACT Inhalation Aero Soln [Pharmacy Med Name: ALBUTEROL SULFATE  (90 BASE) MCG/ACT INHALATION AEROSOL SOLUTION] 8.5 g 1     Sig: INHALE TWO PUFFS INTO THE LUNGS EVERY 4 (FOUR) HOURS AS NEEDED FOR WHEEZING OR SHORTNESS OF BREATH       Asthma & COPD Medication Protocol Passed - 1/19/2023  9:11 AM        Passed - In person appointment or virtual visit in the past 6 mos or appointment in next 3 mos     Recent Outpatient Visits              1 month ago Attention deficit disorder (ADD) without hyperactivity    [facility] [place], DO    Office Visit    5 months ago Attention deficit disorder (ADD) without hyperactivity    [facility], [place] [address], [place] [facility]    Telemedicine    8 months ago Attention deficit disorder (ADD) without hyperactivity    [facility], [place] [address], [place] [facility]    Telemedicine    1 year ago Migraine with aura and without status migrainosus, not intractable    [facility], [place] [address], Brock Pagan,     Office Visit    1 year ago Neoplasm of uncertain behavior of skin    Corinnesa Lis Meeks [facility], MD    Office Visit                                Recent Outpatient Visits              1 month ago Attention deficit disorder (ADD) without hyperactivity    [facility], [facility] [place] P[facility] DO    Office Visit    5 months ago Attention deficit disorder (ADD) without hyperactivity    [facility]    Telemedicine    8 months ago Attention deficit disorder (ADD) without hyperactivity    [facility], [facility] [place], DO    Telemedicine    1 year ago Migraine with aura and without status migrainosus, not intractable    [facility], [address], Brock [place]zo,     Office Visit    1 year ago Neoplasm of uncertain behavior of skin    Nelma Eisenmenger, [place] Grac[place], MD    Office Visit

## 2023-02-17 DIAGNOSIS — J30.89 OTHER ALLERGIC RHINITIS: ICD-10-CM

## 2023-02-17 DIAGNOSIS — J45.30 MILD PERSISTENT ASTHMA, UNSPECIFIED WHETHER COMPLICATED: ICD-10-CM

## 2023-02-17 DIAGNOSIS — L20.82 FLEXURAL ECZEMA: ICD-10-CM

## 2023-02-18 RX ORDER — MONTELUKAST SODIUM 10 MG/1
10 TABLET ORAL NIGHTLY
Qty: 90 TABLET | Refills: 1 | Status: SHIPPED | OUTPATIENT
Start: 2023-02-18 | End: 2023-08-17

## 2023-02-18 NOTE — TELEPHONE ENCOUNTER
Refill passed per [facility], [facility] protocol. Requested Prescriptions   Pending Prescriptions Disp Refills    TRIAMCINOLONE 0.1 % External Ointment [Pharmacy Med Name: TRIAMCINOLONE ACETONIDE 0.1 % EXTERNAL OINTMENT] 30 g 0     Sig: Apply 1 Application. topically 2 (two) times daily. There is no refill protocol information for this order       MONTELUKAST 10 MG Oral Tab [Pharmacy Med Name: MONTELUKAST SODIUM 10 MG ORAL TABLET] 90 tablet 1     Sig: Take 1 tablet (10 mg total) by mouth nightly.        Asthma & COPD Medication Protocol Passed - 2/17/2023  1:33 PM        Passed - In person appointment or virtual visit in the past 6 mos or appointment in next 3 mos     Recent Outpatient Visits              2 months ago Attention deficit disorder (ADD) without hyperactivity    [facility], [place] [address], Brock Bauer,     Office Visit    6 months ago Attention deficit disorder (ADD) without hyperactivity    [facility], [place] [address], [place] [facility], [facility]    Telemedicine    9 months ago Attention deficit disorder (ADD) without hyperactivity    [facility], [place] [address], [facility], [facility]    Telemedicine    1 year ago Migraine with aura and without status migrainosus, not intractable    [facility], [place] [address], Brock Bauer,     Office Visit    1 year ago Neoplasm of uncertain behavior of skin    [address] [facility] Marybelle Najjar, MD    Office Visit                            Recent Outpatient Visits              2 months ago Attention deficit disorder (ADD) without hyperactivity    [facility], [place] [address], Brock Bauer,     Office Visit    6 months ago Attention deficit disorder (ADD) without hyperactivity    [facility] Evans, [facility], [facility]    Telemedicine    9 months ago Attention deficit disorder (ADD) without hyperactivity    [address] [facility], Rupal Kabaawa, DO    Telemedicine    1 year ago Migraine with aura and without status migrainosus, not intractable    [facility], [address], Brock Partida, DO    Office Visit    1 year ago Neoplasm of uncertain behavior of skin    [address] [facility], [place] David Dickey MD    Office Visit

## 2023-02-18 NOTE — TELEPHONE ENCOUNTER
Per OV note 12/15/22 =   Patient has been using Triamcinolone cream for her eczema with relief; she states they worsen during the winter. Usually happens in the elbow. Please review; Protocol Failed / No protocol. Requested Prescriptions   Pending Prescriptions Disp Refills    TRIAMCINOLONE 0.1 % External Ointment [Pharmacy Med Name: TRIAMCINOLONE ACETONIDE 0.1 % EXTERNAL OINTMENT] 30 g 0     Sig: APPLY 1 APPLICATION. TOPICALLY 2 (TWO) TIMES DAILY. There is no refill protocol information for this order      Signed Prescriptions Disp Refills    MONTELUKAST 10 MG Oral Tab 90 tablet 1     Sig: TAKE 1 TABLET (10 MG TOTAL) BY MOUTH NIGHTLY.        Asthma & COPD Medication Protocol Passed - 2/17/2023  1:33 PM        Passed - In person appointment or virtual visit in the past 6 mos or appointment in next 3 mos     Recent Outpatient Visits              2 months ago Attention deficit disorder (ADD) without hyperactivity    [facility], [place] [address], Brock Partida DO    Office Visit    6 months ago Attention deficit disorder (ADD) without hyperactivity    [facility], [place] [address], [facility],     Telemedicine    9 months ago Attention deficit disorder (ADD) without hyperactivity    [facility], [place] [address], [facility],     Tele[facility]icine    1 year ago Migraine with aura and without status migrainosus, not intractable    [facility], [place] [address], Brock Partida DO    Office Visit    1 year ago Neoplasm of uncertain behavior of skin    [address] [facility] David Dickey MD    Office Visit                            Recent Outpatient Visits              2 months ago Attention deficit disorder (ADD) without hyperactivity    [facility], [place] [address], Brock Partida DO    Office Visit    6 months ago Attention deficit disorder (ADD) without mason [place], [place], DO    Telemedicine    9 months ago Attention deficit disorder (ADD) without hyperactivity    [facility], Rupal [place], DO    Telemedicine    1 year ago Migraine with aura and without status migrainosus, not intractable    [facility], [address], Brock Prater DO    Office Visit    1 year ago Neoplasm of uncertain behavior of skin    Fonnie Fercho Aragonmhvania Ramos MD    Office Visit

## 2023-03-20 ENCOUNTER — HOSPITAL ENCOUNTER (OUTPATIENT)
Dept: MAMMOGRAPHY | Age: 43
Discharge: HOME | End: 2023-03-20
Attending: FAMILY MEDICINE
Payer: COMMERCIAL

## 2023-03-20 DIAGNOSIS — Z12.31 VISIT FOR SCREENING MAMMOGRAM: ICD-10-CM

## 2023-03-20 PROCEDURE — 77063 BREAST TOMOSYNTHESIS BI: CPT | Performed by: FAMILY MEDICINE

## 2023-03-20 PROCEDURE — 77067 SCR MAMMO BI INCL CAD: CPT | Performed by: FAMILY MEDICINE

## 2023-03-30 ENCOUNTER — HOSPITAL ENCOUNTER (OUTPATIENT)
Dept: ULTRASOUND IMAGING | Facility: HOSPITAL | Age: 43
Discharge: HOME | End: 2023-03-30
Attending: FAMILY MEDICINE
Payer: COMMERCIAL

## 2023-03-30 ENCOUNTER — HOSPITAL ENCOUNTER (OUTPATIENT)
Dept: MAMMOGRAPHY | Facility: HOSPITAL | Age: 43
Discharge: HOME | End: 2023-03-30
Attending: FAMILY MEDICINE
Payer: COMMERCIAL

## 2023-03-30 DIAGNOSIS — R92.8 ABNORMAL MAMMOGRAM: ICD-10-CM

## 2023-03-30 PROCEDURE — 77065 DX MAMMO INCL CAD UNI: CPT | Performed by: FAMILY MEDICINE

## 2023-03-30 PROCEDURE — 76642 ULTRASOUND BREAST LIMITED: CPT | Performed by: FAMILY MEDICINE

## 2023-03-30 PROCEDURE — 77061 BREAST TOMOSYNTHESIS UNI: CPT | Performed by: FAMILY MEDICINE

## 2023-04-04 DIAGNOSIS — R92.8 ABNORMAL MAMMOGRAM OF RIGHT BREAST: Primary | ICD-10-CM

## 2023-04-06 ENCOUNTER — OFFICE VISIT (OUTPATIENT)
Dept: FAMILY MEDICINE CLINIC | Facility: CLINIC | Age: 43
End: 2023-04-06

## 2023-04-06 VITALS
HEART RATE: 89 BPM | BODY MASS INDEX: 20.06 KG/M2 | WEIGHT: 109 LBS | SYSTOLIC BLOOD PRESSURE: 136 MMHG | DIASTOLIC BLOOD PRESSURE: 84 MMHG | HEIGHT: 62 IN

## 2023-04-06 DIAGNOSIS — E55.9 VITAMIN D DEFICIENCY: ICD-10-CM

## 2023-04-06 DIAGNOSIS — Z01.419 ENCOUNTER FOR GYNECOLOGICAL EXAMINATION: ICD-10-CM

## 2023-04-06 DIAGNOSIS — G43.109 MIGRAINE WITH AURA AND WITHOUT STATUS MIGRAINOSUS, NOT INTRACTABLE: ICD-10-CM

## 2023-04-06 DIAGNOSIS — I10 ESSENTIAL HYPERTENSION: ICD-10-CM

## 2023-04-06 DIAGNOSIS — N94.9 GENITAL LESION, FEMALE: ICD-10-CM

## 2023-04-06 DIAGNOSIS — L30.9 ECZEMA, UNSPECIFIED TYPE: ICD-10-CM

## 2023-04-06 DIAGNOSIS — Z00.00 WELL ADULT EXAM: Primary | ICD-10-CM

## 2023-04-06 DIAGNOSIS — J45.30 MILD PERSISTENT ASTHMA, UNSPECIFIED WHETHER COMPLICATED: ICD-10-CM

## 2023-04-06 DIAGNOSIS — R92.2 DENSE BREAST TISSUE ON MAMMOGRAM: ICD-10-CM

## 2023-04-06 DIAGNOSIS — R80.9 NON-NEPHROTIC RANGE PROTEINURIA: ICD-10-CM

## 2023-04-06 DIAGNOSIS — N02.2 MEMBRANOUS NEPHROPATHY DETERMINED BY BIOPSY: ICD-10-CM

## 2023-04-06 PROBLEM — R92.30 DENSE BREAST TISSUE ON MAMMOGRAM: Status: ACTIVE | Noted: 2023-04-06

## 2023-04-06 PROCEDURE — 3079F DIAST BP 80-89 MM HG: CPT | Performed by: FAMILY MEDICINE

## 2023-04-06 PROCEDURE — 3008F BODY MASS INDEX DOCD: CPT | Performed by: FAMILY MEDICINE

## 2023-04-06 PROCEDURE — 3075F SYST BP GE 130 - 139MM HG: CPT | Performed by: FAMILY MEDICINE

## 2023-04-06 PROCEDURE — 99396 PREV VISIT EST AGE 40-64: CPT | Performed by: FAMILY MEDICINE

## 2023-04-10 ENCOUNTER — LAB ENCOUNTER (OUTPATIENT)
Dept: LAB | Facility: HOSPITAL | Age: 43
End: 2023-04-10
Attending: FAMILY MEDICINE
Payer: COMMERCIAL

## 2023-04-10 ENCOUNTER — OFFICE VISIT (OUTPATIENT)
Dept: DERMATOLOGY CLINIC | Facility: CLINIC | Age: 43
End: 2023-04-10

## 2023-04-10 DIAGNOSIS — L20.84 INTRINSIC ECZEMA: Primary | ICD-10-CM

## 2023-04-10 DIAGNOSIS — F98.8 ATTENTION DEFICIT DISORDER (ADD) WITHOUT HYPERACTIVITY: ICD-10-CM

## 2023-04-10 DIAGNOSIS — R80.9 NON-NEPHROTIC RANGE PROTEINURIA: ICD-10-CM

## 2023-04-10 DIAGNOSIS — Z00.00 WELL ADULT EXAM: ICD-10-CM

## 2023-04-10 DIAGNOSIS — R73.9 HYPERGLYCEMIA: ICD-10-CM

## 2023-04-10 DIAGNOSIS — N02.2 MEMBRANOUS NEPHROPATHY DETERMINED BY BIOPSY: ICD-10-CM

## 2023-04-10 LAB
ALBUMIN SERPL-MCNC: 4.1 G/DL (ref 3.4–5)
ALBUMIN/GLOB SERPL: 1.4 {RATIO} (ref 1–2)
ALP LIVER SERPL-CCNC: 63 U/L
ALT SERPL-CCNC: 21 U/L
ANION GAP SERPL CALC-SCNC: 6 MMOL/L (ref 0–18)
AST SERPL-CCNC: 14 U/L (ref 15–37)
BASOPHILS # BLD AUTO: 0.11 X10(3) UL (ref 0–0.2)
BASOPHILS NFR BLD AUTO: 1 %
BILIRUB SERPL-MCNC: 0.4 MG/DL (ref 0.1–2)
BILIRUB UR QL: NEGATIVE
BUN BLD-MCNC: 13 MG/DL (ref 7–18)
BUN/CREAT SERPL: 14.9 (ref 10–20)
CALCIUM BLD-MCNC: 9 MG/DL (ref 8.5–10.1)
CHLORIDE SERPL-SCNC: 109 MMOL/L (ref 98–112)
CHOLEST SERPL-MCNC: 164 MG/DL (ref ?–200)
CLARITY UR: CLEAR
CO2 SERPL-SCNC: 23 MMOL/L (ref 21–32)
CREAT BLD-MCNC: 0.87 MG/DL
CREAT UR-SCNC: 206 MG/DL
DEPRECATED RDW RBC AUTO: 45.5 FL (ref 35.1–46.3)
EOSINOPHIL # BLD AUTO: 0.82 X10(3) UL (ref 0–0.7)
EOSINOPHIL NFR BLD AUTO: 7.5 %
ERYTHROCYTE [DISTWIDTH] IN BLOOD BY AUTOMATED COUNT: 13.4 % (ref 11–15)
FASTING PATIENT LIPID ANSWER: YES
FASTING STATUS PATIENT QL REPORTED: YES
GFR SERPLBLD BASED ON 1.73 SQ M-ARVRAT: 86 ML/MIN/1.73M2 (ref 60–?)
GLOBULIN PLAS-MCNC: 2.9 G/DL (ref 2.8–4.4)
GLUCOSE BLD-MCNC: 102 MG/DL (ref 70–99)
GLUCOSE UR-MCNC: NORMAL MG/DL
HCT VFR BLD AUTO: 44.6 %
HDLC SERPL-MCNC: 87 MG/DL (ref 40–59)
HGB BLD-MCNC: 14.1 G/DL
HGB UR QL STRIP.AUTO: NEGATIVE
IMM GRANULOCYTES # BLD AUTO: 0.05 X10(3) UL (ref 0–1)
IMM GRANULOCYTES NFR BLD: 0.5 %
KETONES UR-MCNC: NEGATIVE MG/DL
LDLC SERPL CALC-MCNC: 67 MG/DL (ref ?–100)
LEUKOCYTE ESTERASE UR QL STRIP.AUTO: NEGATIVE
LYMPHOCYTES # BLD AUTO: 3.1 X10(3) UL (ref 1–4)
LYMPHOCYTES NFR BLD AUTO: 28.4 %
MCH RBC QN AUTO: 29.2 PG (ref 26–34)
MCHC RBC AUTO-ENTMCNC: 31.6 G/DL (ref 31–37)
MCV RBC AUTO: 92.3 FL
MICROALBUMIN UR-MCNC: 13.8 MG/DL
MICROALBUMIN/CREAT 24H UR-RTO: 67 UG/MG (ref ?–30)
MONOCYTES # BLD AUTO: 0.66 X10(3) UL (ref 0.1–1)
MONOCYTES NFR BLD AUTO: 6.1 %
NEUTROPHILS # BLD AUTO: 6.16 X10 (3) UL (ref 1.5–7.7)
NEUTROPHILS # BLD AUTO: 6.16 X10(3) UL (ref 1.5–7.7)
NEUTROPHILS NFR BLD AUTO: 56.5 %
NITRITE UR QL STRIP.AUTO: NEGATIVE
NONHDLC SERPL-MCNC: 77 MG/DL (ref ?–130)
OSMOLALITY SERPL CALC.SUM OF ELEC: 286 MOSM/KG (ref 275–295)
PH UR: 5.5 [PH] (ref 5–8)
PLATELET # BLD AUTO: 283 10(3)UL (ref 150–450)
POTASSIUM SERPL-SCNC: 4.1 MMOL/L (ref 3.5–5.1)
PROT SERPL-MCNC: 7 G/DL (ref 6.4–8.2)
PROT UR-MCNC: 30 MG/DL
RBC # BLD AUTO: 4.83 X10(6)UL
SODIUM SERPL-SCNC: 138 MMOL/L (ref 136–145)
SP GR UR STRIP: 1.02 (ref 1–1.03)
TRIGL SERPL-MCNC: 48 MG/DL (ref 30–149)
TSI SER-ACNC: 1.48 MIU/ML (ref 0.36–3.74)
UROBILINOGEN UR STRIP-ACNC: NORMAL
VIT D+METAB SERPL-MCNC: 22.2 NG/ML (ref 30–100)
VLDLC SERPL CALC-MCNC: 7 MG/DL (ref 0–30)
WBC # BLD AUTO: 10.9 X10(3) UL (ref 4–11)

## 2023-04-10 PROCEDURE — 83036 HEMOGLOBIN GLYCOSYLATED A1C: CPT

## 2023-04-10 PROCEDURE — 82306 VITAMIN D 25 HYDROXY: CPT | Performed by: FAMILY MEDICINE

## 2023-04-10 PROCEDURE — 80053 COMPREHEN METABOLIC PANEL: CPT

## 2023-04-10 PROCEDURE — 36415 COLL VENOUS BLD VENIPUNCTURE: CPT

## 2023-04-10 PROCEDURE — 81001 URINALYSIS AUTO W/SCOPE: CPT

## 2023-04-10 PROCEDURE — 99213 OFFICE O/P EST LOW 20 MIN: CPT | Performed by: PHYSICIAN ASSISTANT

## 2023-04-10 PROCEDURE — 84443 ASSAY THYROID STIM HORMONE: CPT

## 2023-04-10 PROCEDURE — 82570 ASSAY OF URINE CREATININE: CPT

## 2023-04-10 PROCEDURE — 82043 UR ALBUMIN QUANTITATIVE: CPT

## 2023-04-10 PROCEDURE — 85025 COMPLETE CBC W/AUTO DIFF WBC: CPT

## 2023-04-10 PROCEDURE — 80061 LIPID PANEL: CPT

## 2023-04-10 RX ORDER — FLUOCINONIDE 0.5 MG/G
1 OINTMENT TOPICAL 2 TIMES DAILY
Qty: 60 G | Refills: 2 | Status: SHIPPED | OUTPATIENT
Start: 2023-04-10

## 2023-04-10 RX ORDER — MOMETASONE FUROATE 1 MG/G
1 OINTMENT TOPICAL DAILY
Qty: 30 G | Refills: 3 | Status: SHIPPED | OUTPATIENT
Start: 2023-04-10

## 2023-04-11 DIAGNOSIS — R73.9 HYPERGLYCEMIA: Primary | ICD-10-CM

## 2023-04-11 DIAGNOSIS — E55.9 VITAMIN D DEFICIENCY: ICD-10-CM

## 2023-04-11 LAB
EST. AVERAGE GLUCOSE BLD GHB EST-MCNC: 100 MG/DL (ref 68–126)
HBA1C MFR BLD: 5.1 % (ref ?–5.7)

## 2023-04-11 RX ORDER — ERGOCALCIFEROL 1.25 MG/1
50000 CAPSULE ORAL WEEKLY
Qty: 12 CAPSULE | Refills: 3 | Status: SHIPPED | OUTPATIENT
Start: 2023-04-11 | End: 2023-05-11

## 2023-04-11 NOTE — TELEPHONE ENCOUNTER
Please review. Protocol failed / No Protocol. Requested Prescriptions   Pending Prescriptions Disp Refills    AMPHETAMINE-DEXTROAMPHETAMINE ER 30 MG Oral Capsule SR 24 Hr [Pharmacy Med Name: AMPHETAMINE-DEXTROAMPHET ER 30 MG ORAL CAPSULE EXTENDED RELEASE 24 HOUR] 30 capsule 0     Sig: Take 1 capsule (30 mg total) by mouth daily.        There is no refill protocol information for this order

## 2023-04-12 RX ORDER — DEXTROAMPHETAMINE SACCHARATE, AMPHETAMINE ASPARTATE MONOHYDRATE, DEXTROAMPHETAMINE SULFATE AND AMPHETAMINE SULFATE 7.5; 7.5; 7.5; 7.5 MG/1; MG/1; MG/1; MG/1
30 CAPSULE, EXTENDED RELEASE ORAL DAILY
Qty: 30 CAPSULE | Refills: 0 | Status: SHIPPED | OUTPATIENT
Start: 2023-04-12 | End: 2023-05-12

## 2023-04-25 LAB
HPV I/H RISK 1 DNA SPEC QL NAA+PROBE: NEGATIVE
LAST PAP RESULT: NORMAL

## 2023-05-01 ENCOUNTER — TELEPHONE (OUTPATIENT)
Dept: OBGYN CLINIC | Facility: CLINIC | Age: 43
End: 2023-05-01

## 2023-05-01 ENCOUNTER — OFFICE VISIT (OUTPATIENT)
Dept: OBGYN CLINIC | Facility: CLINIC | Age: 43
End: 2023-05-01

## 2023-05-01 VITALS — BODY MASS INDEX: 19 KG/M2 | WEIGHT: 106.19 LBS | SYSTOLIC BLOOD PRESSURE: 158 MMHG | DIASTOLIC BLOOD PRESSURE: 106 MMHG

## 2023-05-01 DIAGNOSIS — N90.89 VULVAR LESION: Primary | ICD-10-CM

## 2023-05-01 DIAGNOSIS — R03.0 ELEVATED BLOOD PRESSURE READING: Primary | ICD-10-CM

## 2023-05-01 DIAGNOSIS — A63.0 CONDYLOMA: ICD-10-CM

## 2023-05-01 PROCEDURE — 88305 TISSUE EXAM BY PATHOLOGIST: CPT | Performed by: OBSTETRICS & GYNECOLOGY

## 2023-05-01 NOTE — TELEPHONE ENCOUNTER
Left voicemail notifying pt that [employer] [employer] message sent. Pt to call office if she has any questions.

## 2023-05-08 NOTE — PROGRESS NOTES
Dear Dr Aysha Barragan,    Thank you for referring Gabriel Corbett for her vulvar lesion. At the time of her consultation, Enma Gonsales requested excision of her lesion, noted anterior/superior to her clitoral area. Final pathology showed LE 1. I have called her and left a message to call me back, and I am advising she schedule a consultation and f/u with [facility] oncology Dr Christoph Johnston or Dr Aleksander Eastman.       Thank you very much for your kind referral.     Sincerely,     Yenni Watkins

## 2023-05-09 ENCOUNTER — TELEPHONE (OUTPATIENT)
Dept: OBGYN CLINIC | Facility: CLINIC | Age: 43
End: 2023-05-09

## 2023-05-09 NOTE — TELEPHONE ENCOUNTER
Patient called back, would like to discuss results and does not get out of work until 6:30Pm .  Okay to leave a detailed message at mobile number [phone]

## 2023-05-15 DIAGNOSIS — F98.8 ATTENTION DEFICIT DISORDER (ADD) WITHOUT HYPERACTIVITY: ICD-10-CM

## 2023-05-17 DIAGNOSIS — F98.8 ATTENTION DEFICIT DISORDER (ADD) WITHOUT HYPERACTIVITY: ICD-10-CM

## 2023-05-17 RX ORDER — DEXTROAMPHETAMINE SACCHARATE, AMPHETAMINE ASPARTATE MONOHYDRATE, DEXTROAMPHETAMINE SULFATE AND AMPHETAMINE SULFATE 7.5; 7.5; 7.5; 7.5 MG/1; MG/1; MG/1; MG/1
30 CAPSULE, EXTENDED RELEASE ORAL DAILY
Qty: 30 CAPSULE | Refills: 0 | Status: SHIPPED | OUTPATIENT
Start: 2023-05-17 | End: 2023-06-16

## 2023-05-18 RX ORDER — DEXTROAMPHETAMINE SACCHARATE, AMPHETAMINE ASPARTATE MONOHYDRATE, DEXTROAMPHETAMINE SULFATE AND AMPHETAMINE SULFATE 7.5; 7.5; 7.5; 7.5 MG/1; MG/1; MG/1; MG/1
30 CAPSULE, EXTENDED RELEASE ORAL DAILY
Qty: 30 CAPSULE | Refills: 0 | OUTPATIENT
Start: 2023-05-18 | End: 2023-06-17

## 2023-05-19 NOTE — TELEPHONE ENCOUNTER
Please inform,  pt has LE 1/vulvar intraepithelial neoplasia,  advise consult with dr Clara Adamson or dr Jorge Suazo

## 2023-05-22 NOTE — TELEPHONE ENCOUNTER
Patient name and  verified. Patient informed of ASJ result note and recommendations. Clinicals faxed to Dr. Heard Beyer office. Contact number for gyne onc given to patient.

## 2023-05-31 PROCEDURE — 87624 HPV HI-RISK TYP POOLED RSLT: CPT | Performed by: CLINICAL MEDICAL LABORATORY

## 2023-06-01 ENCOUNTER — LAB REQUISITION (OUTPATIENT)
Dept: LAB | Age: 43
End: 2023-06-01

## 2023-06-01 DIAGNOSIS — N90.0 MILD VULVAR DYSPLASIA: ICD-10-CM

## 2023-06-01 LAB
HPV16+18+45 E6+E7MRNA CVX NAA+PROBE: NEGATIVE
Lab: NORMAL

## 2023-06-06 LAB
ASR DISCLAIMER: NORMAL
ASR DISCLAIMER: NORMAL
CASE RPRT: NORMAL
CASE RPRT: NORMAL
CLINICAL INFO: NORMAL
CLINICAL INFO: NORMAL
PATH REPORT.FINAL DX SPEC: NORMAL
PATH REPORT.FINAL DX SPEC: NORMAL
PATH REPORT.GROSS SPEC: NORMAL
PATH REPORT.GROSS SPEC: NORMAL

## 2023-06-28 ENCOUNTER — MED REC SCAN ONLY (OUTPATIENT)
Dept: OBGYN CLINIC | Facility: CLINIC | Age: 43
End: 2023-06-28

## 2023-07-10 ENCOUNTER — OFFICE VISIT (OUTPATIENT)
Dept: FAMILY MEDICINE CLINIC | Facility: CLINIC | Age: 43
End: 2023-07-10

## 2023-07-10 VITALS
TEMPERATURE: 98 F | WEIGHT: 111.63 LBS | DIASTOLIC BLOOD PRESSURE: 90 MMHG | HEART RATE: 56 BPM | BODY MASS INDEX: 20.54 KG/M2 | HEIGHT: 62 IN | SYSTOLIC BLOOD PRESSURE: 122 MMHG

## 2023-07-10 DIAGNOSIS — F98.8 ATTENTION DEFICIT DISORDER (ADD) WITHOUT HYPERACTIVITY: ICD-10-CM

## 2023-07-10 DIAGNOSIS — E55.9 VITAMIN D DEFICIENCY: ICD-10-CM

## 2023-07-10 DIAGNOSIS — J45.30 MILD PERSISTENT ASTHMA, UNSPECIFIED WHETHER COMPLICATED: Primary | ICD-10-CM

## 2023-07-10 PROCEDURE — 3008F BODY MASS INDEX DOCD: CPT | Performed by: FAMILY MEDICINE

## 2023-07-10 PROCEDURE — 99214 OFFICE O/P EST MOD 30 MIN: CPT | Performed by: FAMILY MEDICINE

## 2023-07-10 PROCEDURE — 3074F SYST BP LT 130 MM HG: CPT | Performed by: FAMILY MEDICINE

## 2023-07-10 PROCEDURE — 3080F DIAST BP >= 90 MM HG: CPT | Performed by: FAMILY MEDICINE

## 2023-07-10 RX ORDER — DEXTROAMPHETAMINE SACCHARATE, AMPHETAMINE ASPARTATE MONOHYDRATE, DEXTROAMPHETAMINE SULFATE AND AMPHETAMINE SULFATE 7.5; 7.5; 7.5; 7.5 MG/1; MG/1; MG/1; MG/1
30 CAPSULE, EXTENDED RELEASE ORAL DAILY
Qty: 30 CAPSULE | Refills: 0 | Status: SHIPPED | OUTPATIENT
Start: 2023-07-10 | End: 2023-08-09

## 2023-07-10 RX ORDER — ERGOCALCIFEROL 1.25 MG/1
50000 CAPSULE ORAL WEEKLY
COMMUNITY
Start: 2023-06-09 | End: 2023-07-10

## 2023-07-10 RX ORDER — DEXTROAMPHETAMINE SACCHARATE, AMPHETAMINE ASPARTATE MONOHYDRATE, DEXTROAMPHETAMINE SULFATE AND AMPHETAMINE SULFATE 7.5; 7.5; 7.5; 7.5 MG/1; MG/1; MG/1; MG/1
30 CAPSULE, EXTENDED RELEASE ORAL DAILY
Qty: 30 CAPSULE | Refills: 0 | Status: CANCELLED | OUTPATIENT
Start: 2023-07-10 | End: 2023-08-09

## 2023-07-10 RX ORDER — ERGOCALCIFEROL 1.25 MG/1
50000 CAPSULE ORAL WEEKLY
Qty: 12 CAPSULE | Refills: 1 | Status: SHIPPED | OUTPATIENT
Start: 2023-07-10

## 2023-07-10 RX ORDER — ALBUTEROL SULFATE 90 UG/1
AEROSOL, METERED RESPIRATORY (INHALATION)
Qty: 8.5 G | Refills: 2 | Status: SHIPPED | OUTPATIENT
Start: 2023-07-10

## 2023-07-10 RX ORDER — DEXTROAMPHETAMINE SACCHARATE, AMPHETAMINE ASPARTATE MONOHYDRATE, DEXTROAMPHETAMINE SULFATE AND AMPHETAMINE SULFATE 7.5; 7.5; 7.5; 7.5 MG/1; MG/1; MG/1; MG/1
30 CAPSULE, EXTENDED RELEASE ORAL DAILY
Qty: 30 CAPSULE | Refills: 0 | Status: SHIPPED | OUTPATIENT
Start: 2023-08-10 | End: 2023-09-09

## 2023-07-10 RX ORDER — DEXTROAMPHETAMINE SACCHARATE, AMPHETAMINE ASPARTATE MONOHYDRATE, DEXTROAMPHETAMINE SULFATE AND AMPHETAMINE SULFATE 7.5; 7.5; 7.5; 7.5 MG/1; MG/1; MG/1; MG/1
30 CAPSULE, EXTENDED RELEASE ORAL DAILY
Qty: 30 CAPSULE | Refills: 0 | Status: SHIPPED | OUTPATIENT
Start: 2023-09-10 | End: 2023-10-10

## 2023-08-21 ENCOUNTER — OFFICE VISIT (OUTPATIENT)
Dept: NEPHROLOGY | Facility: CLINIC | Age: 43
End: 2023-08-21

## 2023-08-21 VITALS
HEIGHT: 62 IN | DIASTOLIC BLOOD PRESSURE: 87 MMHG | WEIGHT: 112 LBS | SYSTOLIC BLOOD PRESSURE: 127 MMHG | BODY MASS INDEX: 20.61 KG/M2 | HEART RATE: 84 BPM

## 2023-08-21 DIAGNOSIS — N02.2 MEMBRANOUS NEPHROPATHY DETERMINED BY BIOPSY: Primary | ICD-10-CM

## 2023-08-21 DIAGNOSIS — R80.9 NON-NEPHROTIC RANGE PROTEINURIA: ICD-10-CM

## 2023-08-21 PROCEDURE — 99214 OFFICE O/P EST MOD 30 MIN: CPT | Performed by: INTERNAL MEDICINE

## 2023-08-21 PROCEDURE — 3079F DIAST BP 80-89 MM HG: CPT | Performed by: INTERNAL MEDICINE

## 2023-08-21 PROCEDURE — 3008F BODY MASS INDEX DOCD: CPT | Performed by: INTERNAL MEDICINE

## 2023-08-21 PROCEDURE — 3074F SYST BP LT 130 MM HG: CPT | Performed by: INTERNAL MEDICINE

## 2023-08-21 NOTE — PROGRESS NOTES
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 She had a kidney biopsy there ( Dr. Mac Miller ) and was started on enalapril. She was followed by Nephrologist till 2007 when she quit job there and didn't followed up. She doesn't recall the results of kidney biopsy, doesn't recall getting any steroid or immunosuppressant medication. We contacted Dr. Althea Cheema office but the records regarding her visit and kidney biopsy have been discarded. Denies any rash, joint aches, abdominal pain, NVD, no hematochezia, cough, sob, leg swelling, fever, chills. + smoking - a pack will last for a week. Consume alcohol socially. No OTC herbal medication, NSAIDs. denies any leg swelling, frothy urine or gross hematuria     Her lab results showed normal RF, ESR, CRP, C3, C4, SPEP and UPEP. She had negative HIV, hepatitis B and C, ANCA. Had approx 2.3 grams proteinuria on 24hr urine test. She had kidney biopsy done on 4/13/15 which showed membranous nephropathy with IgG staining (2+/4+) and focal global and focal segmental glomerular scarring. \"Compared to the previous kidney biopsy (see [id number]) from 2002, the extent of tubulointerstitial scarring is mildly increased in the current biopsy. The presence of mesangial immune deposits and particularly the segmental distribution of subepithelial deposits is consistent with a secondary form of membranous nephropathy\"     Quit smoking. No urinary complaints or leg swelling. Weight stable.  working as veternary technician    HISTORY:  Past Medical History:   Diagnosis Date    Asthma     Attention deficit disorder     Extrinsic asthma, unspecified     Kidney disease     Unspecified essential hypertension     Verruca vulgaris 07/2020    [place] [facility]      Past Surgical History:   Procedure Laterality Date    OTHER      KIDNEY BIOPSY    SHOULDER ARTHROSCOPY Right 2018    Rotator cuff repair           Medications (Active prior to today's visit):  Current Outpatient Medications   Medication Sig Dispense Refill    albuterol 108 (90 Base) MCG/ACT Inhalation Aero Soln INHALE TWO PUFFS INTO THE LUNGS EVERY 4 (FOUR) HOURS AS NEEDED FOR WHEEZING OR SHORTNESS OF BREATH 8.5 g 2    ergocalciferol 1.25 MG (20018 UT) Oral Cap Take 1 capsule (50,000 Units total) by mouth once a week. 12 capsule 1    amphetamine-dextroamphetamine ER (ADDERALL XR) 30 MG Oral Capsule SR 24 Hr Take 1 capsule (30 mg total) by mouth daily. 30 capsule 0    mometasone 0.1 % External Ointment Apply 1 Application topically daily. 30 g 3    fluocinonide 0.05 % External Ointment Apply 1 Application topically 2 (two) times daily. 60 g 2    fluticasone propionate 50 MCG/ACT Nasal Suspension 2 sprays by Each Nare route daily. 3 each 2    triamcinolone 0.1 % External Ointment Apply 1 Application. topically 2 (two) times daily. 30 g 0    losartan 50 MG Oral Tab Take 1 tablet (50 mg total) by mouth daily. 90 tablet 2    montelukast 10 MG Oral Tab Take 1 tablet (10 mg total) by mouth nightly. 90 tablet 2    cyanocobalamin 100 MCG Oral Tab Take 2.5 tablets (250 mcg total) by mouth daily. Collagen 500 MG Oral Cap Take by mouth.          Allergies:  No Known Allergies      ROS:     Constitutional: Negative for decreased activity, fever, irritability and lethargy  ENMT: Negative for ear drainage, hearing loss and nasal drainage  Eyes: Negative for eye discharge and vision loss  Cardiovascular: Negative for chest pain, sob, irregular heartbeat/palpitations  Respiratory: Negative for cough, dyspnea and wheezing  Gastrointestinal: Negative for abdominal pain, constipation, decreased appetite  Genitourinary: Negative for dysuria and hematuria  Endocrine: Negative for abnormal sleep patterns, increased activity  Hema/Lymph: Negative for easy bleeding and easy bruising  Integumentary: Negative for pruritus and rash  Musculoskeletal: Negative for bone/joint symptoms  Neurological: Negative for gait disturbance  Psychiatric: Negative for inappropriate interaction        08/21/23  0902   BP: 127/87   Pulse: 84     Wt Readings from Last 6 Encounters:  08/21/23 : 112 lb (50.8 kg)  07/10/23 : 111 lb 9.6 oz (50.6 kg)  05/01/23 : 106 lb 3.2 oz (48.2 kg)  04/06/23 : 109 lb (49.4 kg)  03/30/23 : 108 lb (49 kg)  12/15/22 : 103 lb 6.4 oz (46.9 kg)      PHYSICAL EXAM:   Constitutional: appears well hydrated alert and responsive no acute distress noted  Head/Face: normocephalic  Eyes/Vision: normal extraocular motion is intact  Nose/Mouth/Throat:mucous membranes are moist  Neck/Thyroid: neck is supple   Skin/Hair: no abnormal bruising noted  Back/Spine: no abnormalities noted  Extremities: no edema  Neurological: Grossly normal       ASSESSMENT/PLAN:     1. Biopsy proven Membranous nephropathy with preserved kidney function  - UA + microscopic hematuria and proteinuria with 24hrs urine protein around 2.3 grams -> 1.5gm -> <100 mg  - repeat UA mild protein with no RBC  - BUN/Cr 11/0.84 mg/dl with an eGFR 90 ml/min - stable  - hepatitis B, C and HIV negative and normal RF, ESR, CRP, C3, C4, SPEP and UPEP, ENEDINA, ANCA  - though biopsy showed concern for secondary membranous all her hayward so far has been negative  - had recent pap smear. CXR negative for any lung pathology  - goal is to minimize proteinuria for renoprotective purpose and to preserve kidney function   - on losartan 50 mg.   - no indication for treatment with immunosuppressive agent unless worsening proteinuria or renal function   - stable kidney function with creatinine 0.85 mg/dl with an eGFR 86 ml/min  - PLA2R negative and anti GBM negative     2.  Hypertension: BP stable  - Goal BP is < 130/80 given proteinuria and currently at goal  - off of enalapril for cough and on losartan 50 mg   - cont. current treatment     Follow up in 9 months    Call to order lab test prior to next visit     Orders This Visit:  No orders of the defined types were placed in this encounter.       Meds This Visit:  Requested Prescriptions      No prescriptions requested or ordered in this encounter     Imaging & Referrals:  None     8/21/2023  Gallito Bower MD

## 2023-10-09 ENCOUNTER — HOSPITAL ENCOUNTER (OUTPATIENT)
Dept: ULTRASOUND IMAGING | Facility: HOSPITAL | Age: 43
Discharge: HOME | End: 2023-10-09
Attending: FAMILY MEDICINE
Payer: COMMERCIAL

## 2023-10-09 ENCOUNTER — HOSPITAL ENCOUNTER (OUTPATIENT)
Dept: MAMMOGRAPHY | Facility: HOSPITAL | Age: 43
Discharge: HOME | End: 2023-10-09
Attending: FAMILY MEDICINE
Payer: COMMERCIAL

## 2023-10-09 ENCOUNTER — LAB ENCOUNTER (OUTPATIENT)
Dept: LAB | Facility: HOSPITAL | Age: 43
End: 2023-10-09
Attending: FAMILY MEDICINE
Payer: COMMERCIAL

## 2023-10-09 DIAGNOSIS — Z01.818 PREOP TESTING: ICD-10-CM

## 2023-10-09 DIAGNOSIS — R92.8 ABNORMAL MAMMOGRAM OF RIGHT BREAST: ICD-10-CM

## 2023-10-09 DIAGNOSIS — F98.8 ATTENTION DEFICIT DISORDER (ADD) WITHOUT HYPERACTIVITY: ICD-10-CM

## 2023-10-09 DIAGNOSIS — N90.0 VIN I (VULVAR INTRAEPITHELIAL NEOPLASIA I): Primary | ICD-10-CM

## 2023-10-09 DIAGNOSIS — B08.8 OTHER SPECIFIED VIRAL INFECTIONS CHARACTERIZED BY SKIN AND MUCOUS MEMBRANE LESIONS: ICD-10-CM

## 2023-10-09 LAB
ALBUMIN SERPL-MCNC: 3.9 G/DL (ref 3.4–5)
ALBUMIN/GLOB SERPL: 1.2 {RATIO} (ref 1–2)
ALP LIVER SERPL-CCNC: 64 U/L
ALT SERPL-CCNC: 20 U/L
ANION GAP SERPL CALC-SCNC: 4 MMOL/L (ref 0–18)
APTT PPP: 27.8 SECONDS (ref 23.3–35.6)
AST SERPL-CCNC: 14 U/L (ref 15–37)
BASOPHILS # BLD AUTO: 0.11 X10(3) UL (ref 0–0.2)
BASOPHILS NFR BLD AUTO: 0.9 %
BILIRUB SERPL-MCNC: 0.3 MG/DL (ref 0.1–2)
BUN BLD-MCNC: 8 MG/DL (ref 7–18)
BUN/CREAT SERPL: 10.3 (ref 10–20)
CALCIUM BLD-MCNC: 9.4 MG/DL (ref 8.5–10.1)
CHLORIDE SERPL-SCNC: 106 MMOL/L (ref 98–112)
CO2 SERPL-SCNC: 28 MMOL/L (ref 21–32)
CREAT BLD-MCNC: 0.78 MG/DL
DEPRECATED RDW RBC AUTO: 45.4 FL (ref 35.1–46.3)
EGFRCR SERPLBLD CKD-EPI 2021: 98 ML/MIN/1.73M2 (ref 60–?)
EOSINOPHIL # BLD AUTO: 0.93 X10(3) UL (ref 0–0.7)
EOSINOPHIL NFR BLD AUTO: 7.9 %
ERYTHROCYTE [DISTWIDTH] IN BLOOD BY AUTOMATED COUNT: 13.3 % (ref 11–15)
FASTING STATUS PATIENT QL REPORTED: NO
GLOBULIN PLAS-MCNC: 3.3 G/DL (ref 2.8–4.4)
GLUCOSE BLD-MCNC: 97 MG/DL (ref 70–99)
HCT VFR BLD AUTO: 43 %
HGB BLD-MCNC: 13.5 G/DL
IMM GRANULOCYTES # BLD AUTO: 0.03 X10(3) UL (ref 0–1)
IMM GRANULOCYTES NFR BLD: 0.3 %
INR BLD: 0.94 (ref 0.8–1.2)
LYMPHOCYTES # BLD AUTO: 3.11 X10(3) UL (ref 1–4)
LYMPHOCYTES NFR BLD AUTO: 26.3 %
MCH RBC QN AUTO: 29.2 PG (ref 26–34)
MCHC RBC AUTO-ENTMCNC: 31.4 G/DL (ref 31–37)
MCV RBC AUTO: 93.1 FL
MONOCYTES # BLD AUTO: 0.99 X10(3) UL (ref 0.1–1)
MONOCYTES NFR BLD AUTO: 8.4 %
NEUTROPHILS # BLD AUTO: 6.66 X10 (3) UL (ref 1.5–7.7)
NEUTROPHILS # BLD AUTO: 6.66 X10(3) UL (ref 1.5–7.7)
NEUTROPHILS NFR BLD AUTO: 56.2 %
OSMOLALITY SERPL CALC.SUM OF ELEC: 284 MOSM/KG (ref 275–295)
PLATELET # BLD AUTO: 317 10(3)UL (ref 150–450)
POTASSIUM SERPL-SCNC: 4.1 MMOL/L (ref 3.5–5.1)
PROT SERPL-MCNC: 7.2 G/DL (ref 6.4–8.2)
PROTHROMBIN TIME: 13.1 SECONDS (ref 11.6–14.8)
RBC # BLD AUTO: 4.62 X10(6)UL
SODIUM SERPL-SCNC: 138 MMOL/L (ref 136–145)
WBC # BLD AUTO: 11.8 X10(3) UL (ref 4–11)

## 2023-10-09 PROCEDURE — 36415 COLL VENOUS BLD VENIPUNCTURE: CPT

## 2023-10-09 PROCEDURE — 85610 PROTHROMBIN TIME: CPT

## 2023-10-09 PROCEDURE — 77065 DX MAMMO INCL CAD UNI: CPT | Performed by: FAMILY MEDICINE

## 2023-10-09 PROCEDURE — 76642 ULTRASOUND BREAST LIMITED: CPT | Performed by: FAMILY MEDICINE

## 2023-10-09 PROCEDURE — 85025 COMPLETE CBC W/AUTO DIFF WBC: CPT

## 2023-10-09 PROCEDURE — 85730 THROMBOPLASTIN TIME PARTIAL: CPT

## 2023-10-09 PROCEDURE — 77061 BREAST TOMOSYNTHESIS UNI: CPT | Performed by: FAMILY MEDICINE

## 2023-10-09 PROCEDURE — 80053 COMPREHEN METABOLIC PANEL: CPT

## 2023-10-10 DIAGNOSIS — N63.10 MASS OF RIGHT BREAST, UNSPECIFIED QUADRANT: ICD-10-CM

## 2023-10-10 DIAGNOSIS — R92.333 HETEROGENEOUSLY DENSE TISSUE OF BOTH BREASTS ON MAMMOGRAPHY: Primary | ICD-10-CM

## 2023-10-10 RX ORDER — ZINC GLUCONATE 50 MG
1 TABLET ORAL DAILY
COMMUNITY

## 2023-10-10 RX ORDER — ASCORBIC ACID 500 MG
500 TABLET ORAL DAILY
COMMUNITY

## 2023-10-10 RX ORDER — MULTIVITAMIN WITH IRON
250 TABLET ORAL DAILY
COMMUNITY

## 2023-10-10 NOTE — TELEPHONE ENCOUNTER
Please review. Protocol failed / Has no protocol. Requested Prescriptions   Pending Prescriptions Disp Refills    AMPHETAMINE-DEXTROAMPHETAMINE ER 30 MG Oral Capsule SR 24 Hr [Pharmacy Med Name: AMPHETAMINE-DEXTROAMPHET ER 30 MG ORAL CAPSULE EXTENDED RELEASE 24 HOUR] 30 capsule 0     Sig: Take 1 capsule (30 mg total) by mouth daily.        There is no refill protocol information for this order           Recent Outpatient Visits              1 month ago Membranous nephropathy 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    Maddie Ackerman Lula Locket, MD    Office Visit    3 months ago Mild persistent asthma, unspecified whether complicated    Brock Rose Si, DO    Office Visit    5 months ago Vulvar lesion    Courtney Mckenzie, [address] [facility], [address] [place] [address] Milo Franklin MD    Office Visit    6 months ago [address], Cordova Hecker Son, PA-C    Office Visit    6 months ago Well adult exam    Keanu Coronado MD    Office Visit

## 2023-10-11 DIAGNOSIS — I10 ESSENTIAL HYPERTENSION: ICD-10-CM

## 2023-10-11 DIAGNOSIS — E55.9 VITAMIN D DEFICIENCY: ICD-10-CM

## 2023-10-12 RX ORDER — DEXTROAMPHETAMINE SACCHARATE, AMPHETAMINE ASPARTATE MONOHYDRATE, DEXTROAMPHETAMINE SULFATE AND AMPHETAMINE SULFATE 7.5; 7.5; 7.5; 7.5 MG/1; MG/1; MG/1; MG/1
30 CAPSULE, EXTENDED RELEASE ORAL DAILY
Qty: 30 CAPSULE | Refills: 0 | Status: SHIPPED | OUTPATIENT
Start: 2023-10-13 | End: 2023-11-12

## 2023-10-12 RX ORDER — ERGOCALCIFEROL 1.25 MG/1
50000 CAPSULE ORAL WEEKLY
Qty: 12 CAPSULE | Refills: 1 | OUTPATIENT
Start: 2023-10-12

## 2023-10-12 RX ORDER — LOSARTAN POTASSIUM 50 MG/1
50 TABLET ORAL DAILY
Qty: 90 TABLET | Refills: 2 | OUTPATIENT
Start: 2023-10-12

## 2023-10-13 ENCOUNTER — HOSPITAL ENCOUNTER (OUTPATIENT)
Facility: HOSPITAL | Age: 43
Setting detail: HOSPITAL OUTPATIENT SURGERY
Discharge: HOME | End: 2023-10-13
Attending: OBSTETRICS & GYNECOLOGY | Admitting: OBSTETRICS & GYNECOLOGY
Payer: COMMERCIAL

## 2023-10-13 ENCOUNTER — ANESTHESIA (OUTPATIENT)
Dept: SURGERY | Facility: HOSPITAL | Age: 43
End: 2023-10-13
Payer: COMMERCIAL

## 2023-10-13 ENCOUNTER — ANESTHESIA EVENT (OUTPATIENT)
Dept: SURGERY | Facility: HOSPITAL | Age: 43
End: 2023-10-13
Payer: COMMERCIAL

## 2023-10-13 VITALS
BODY MASS INDEX: 20.61 KG/M2 | DIASTOLIC BLOOD PRESSURE: 85 MMHG | WEIGHT: 112 LBS | RESPIRATION RATE: 16 BRPM | SYSTOLIC BLOOD PRESSURE: 145 MMHG | HEIGHT: 62 IN | HEART RATE: 74 BPM | TEMPERATURE: 98 F | OXYGEN SATURATION: 100 %

## 2023-10-13 LAB — B-HCG UR QL: NEGATIVE

## 2023-10-13 PROCEDURE — 81025 URINE PREGNANCY TEST: CPT

## 2023-10-13 PROCEDURE — 0UBC8ZX EXCISION OF CERVIX, VIA NATURAL OR ARTIFICIAL OPENING ENDOSCOPIC, DIAGNOSTIC: ICD-10-PCS | Performed by: OBSTETRICS & GYNECOLOGY

## 2023-10-13 PROCEDURE — 88305 TISSUE EXAM BY PATHOLOGIST: CPT | Performed by: OBSTETRICS & GYNECOLOGY

## 2023-10-13 PROCEDURE — 88307 TISSUE EXAM BY PATHOLOGIST: CPT | Performed by: OBSTETRICS & GYNECOLOGY

## 2023-10-13 RX ORDER — HYDRALAZINE HYDROCHLORIDE 20 MG/ML
10 INJECTION INTRAMUSCULAR; INTRAVENOUS ONCE
Status: COMPLETED | OUTPATIENT
Start: 2023-10-13 | End: 2023-10-13

## 2023-10-13 RX ORDER — KETOROLAC TROMETHAMINE 30 MG/ML
INJECTION, SOLUTION INTRAMUSCULAR; INTRAVENOUS AS NEEDED
Status: DISCONTINUED | OUTPATIENT
Start: 2023-10-13 | End: 2023-10-13 | Stop reason: SURG

## 2023-10-13 RX ORDER — LIDOCAINE 50 MG/G
1 OINTMENT TOPICAL AS NEEDED
Qty: 60 G | Refills: 0 | Status: SHIPPED | OUTPATIENT
Start: 2023-10-13

## 2023-10-13 RX ORDER — ONDANSETRON 2 MG/ML
4 INJECTION INTRAMUSCULAR; INTRAVENOUS EVERY 6 HOURS PRN
Status: DISCONTINUED | OUTPATIENT
Start: 2023-10-13 | End: 2023-10-13

## 2023-10-13 RX ORDER — HYDROMORPHONE HYDROCHLORIDE 1 MG/ML
0.6 INJECTION, SOLUTION INTRAMUSCULAR; INTRAVENOUS; SUBCUTANEOUS EVERY 5 MIN PRN
Status: DISCONTINUED | OUTPATIENT
Start: 2023-10-13 | End: 2023-10-13

## 2023-10-13 RX ORDER — MIDAZOLAM HYDROCHLORIDE 1 MG/ML
INJECTION INTRAMUSCULAR; INTRAVENOUS AS NEEDED
Status: DISCONTINUED | OUTPATIENT
Start: 2023-10-13 | End: 2023-10-13 | Stop reason: SURG

## 2023-10-13 RX ORDER — ACETAMINOPHEN 500 MG
1000 TABLET ORAL ONCE
Status: COMPLETED | OUTPATIENT
Start: 2023-10-13 | End: 2023-10-13

## 2023-10-13 RX ORDER — HYDROCODONE BITARTRATE AND ACETAMINOPHEN 5; 325 MG/1; MG/1
2 TABLET ORAL EVERY 6 HOURS PRN
Status: DISCONTINUED | OUTPATIENT
Start: 2023-10-13 | End: 2023-10-13

## 2023-10-13 RX ORDER — HYDROMORPHONE HYDROCHLORIDE 1 MG/ML
0.2 INJECTION, SOLUTION INTRAMUSCULAR; INTRAVENOUS; SUBCUTANEOUS EVERY 5 MIN PRN
Status: DISCONTINUED | OUTPATIENT
Start: 2023-10-13 | End: 2023-10-13

## 2023-10-13 RX ORDER — DEXAMETHASONE SODIUM PHOSPHATE 4 MG/ML
VIAL (ML) INJECTION AS NEEDED
Status: DISCONTINUED | OUTPATIENT
Start: 2023-10-13 | End: 2023-10-13 | Stop reason: SURG

## 2023-10-13 RX ORDER — IBUPROFEN 600 MG/1
600 TABLET ORAL EVERY 8 HOURS PRN
Qty: 60 TABLET | Refills: 0 | Status: SHIPPED | OUTPATIENT
Start: 2023-10-13

## 2023-10-13 RX ORDER — ACETIC ACID 0.25 G/100ML
IRRIGANT IRRIGATION AS NEEDED
Status: DISCONTINUED | OUTPATIENT
Start: 2023-10-13 | End: 2023-10-13 | Stop reason: HOSPADM

## 2023-10-13 RX ORDER — SODIUM CHLORIDE, SODIUM LACTATE, POTASSIUM CHLORIDE, CALCIUM CHLORIDE 600; 310; 30; 20 MG/100ML; MG/100ML; MG/100ML; MG/100ML
INJECTION, SOLUTION INTRAVENOUS CONTINUOUS
Status: DISCONTINUED | OUTPATIENT
Start: 2023-10-13 | End: 2023-10-13

## 2023-10-13 RX ORDER — HYDROCODONE BITARTRATE AND ACETAMINOPHEN 5; 325 MG/1; MG/1
1 TABLET ORAL EVERY 6 HOURS PRN
Status: DISCONTINUED | OUTPATIENT
Start: 2023-10-13 | End: 2023-10-13

## 2023-10-13 RX ORDER — CEFAZOLIN SODIUM/WATER 2 G/20 ML
2 SYRINGE (ML) INTRAVENOUS ONCE
Status: COMPLETED | OUTPATIENT
Start: 2023-10-13 | End: 2023-10-13

## 2023-10-13 RX ORDER — BUPIVACAINE HYDROCHLORIDE AND EPINEPHRINE 2.5; 5 MG/ML; UG/ML
INJECTION, SOLUTION INFILTRATION; PERINEURAL AS NEEDED
Status: DISCONTINUED | OUTPATIENT
Start: 2023-10-13 | End: 2023-10-13 | Stop reason: HOSPADM

## 2023-10-13 RX ORDER — HYDROMORPHONE HYDROCHLORIDE 1 MG/ML
0.4 INJECTION, SOLUTION INTRAMUSCULAR; INTRAVENOUS; SUBCUTANEOUS EVERY 5 MIN PRN
Status: DISCONTINUED | OUTPATIENT
Start: 2023-10-13 | End: 2023-10-13

## 2023-10-13 RX ORDER — NALOXONE HYDROCHLORIDE 0.4 MG/ML
80 INJECTION, SOLUTION INTRAMUSCULAR; INTRAVENOUS; SUBCUTANEOUS AS NEEDED
Status: DISCONTINUED | OUTPATIENT
Start: 2023-10-13 | End: 2023-10-13

## 2023-10-13 RX ORDER — ONDANSETRON 2 MG/ML
INJECTION INTRAMUSCULAR; INTRAVENOUS AS NEEDED
Status: DISCONTINUED | OUTPATIENT
Start: 2023-10-13 | End: 2023-10-13 | Stop reason: SURG

## 2023-10-13 RX ORDER — DOCUSATE SODIUM 100 MG/1
100 CAPSULE, LIQUID FILLED ORAL 2 TIMES DAILY PRN
Qty: 60 CAPSULE | Refills: 0 | Status: SHIPPED | OUTPATIENT
Start: 2023-10-13

## 2023-10-13 RX ORDER — MORPHINE SULFATE 4 MG/ML
4 INJECTION, SOLUTION INTRAMUSCULAR; INTRAVENOUS EVERY 10 MIN PRN
Status: DISCONTINUED | OUTPATIENT
Start: 2023-10-13 | End: 2023-10-13

## 2023-10-13 RX ORDER — ONDANSETRON 4 MG/1
4 TABLET, FILM COATED ORAL EVERY 8 HOURS PRN
Status: DISCONTINUED | OUTPATIENT
Start: 2023-10-13 | End: 2023-10-13

## 2023-10-13 RX ORDER — ACETAMINOPHEN 325 MG/1
650 TABLET ORAL EVERY 6 HOURS PRN
Status: DISCONTINUED | OUTPATIENT
Start: 2023-10-13 | End: 2023-10-13

## 2023-10-13 RX ORDER — PROCHLORPERAZINE EDISYLATE 5 MG/ML
5 INJECTION INTRAMUSCULAR; INTRAVENOUS EVERY 8 HOURS PRN
Status: DISCONTINUED | OUTPATIENT
Start: 2023-10-13 | End: 2023-10-13

## 2023-10-13 RX ORDER — ONDANSETRON 2 MG/ML
4 INJECTION INTRAMUSCULAR; INTRAVENOUS EVERY 8 HOURS PRN
Status: DISCONTINUED | OUTPATIENT
Start: 2023-10-13 | End: 2023-10-13

## 2023-10-13 RX ORDER — BUPIVACAINE HYDROCHLORIDE 5 MG/ML
INJECTION, SOLUTION EPIDURAL; INTRACAUDAL AS NEEDED
Status: DISCONTINUED | OUTPATIENT
Start: 2023-10-13 | End: 2023-10-13 | Stop reason: HOSPADM

## 2023-10-13 RX ORDER — MORPHINE SULFATE 10 MG/ML
6 INJECTION, SOLUTION INTRAMUSCULAR; INTRAVENOUS EVERY 10 MIN PRN
Status: DISCONTINUED | OUTPATIENT
Start: 2023-10-13 | End: 2023-10-13

## 2023-10-13 RX ORDER — HYDROCODONE BITARTRATE AND ACETAMINOPHEN 5; 325 MG/1; MG/1
1 TABLET ORAL EVERY 6 HOURS PRN
Qty: 20 TABLET | Refills: 0 | Status: SHIPPED | OUTPATIENT
Start: 2023-10-13

## 2023-10-13 RX ORDER — MORPHINE SULFATE 4 MG/ML
2 INJECTION, SOLUTION INTRAMUSCULAR; INTRAVENOUS EVERY 10 MIN PRN
Status: DISCONTINUED | OUTPATIENT
Start: 2023-10-13 | End: 2023-10-13

## 2023-10-13 RX ADMIN — DEXAMETHASONE SODIUM PHOSPHATE 4 MG: 4 MG/ML VIAL (ML) INJECTION at 17:11:00

## 2023-10-13 RX ADMIN — MIDAZOLAM HYDROCHLORIDE 2 MG: 1 INJECTION INTRAMUSCULAR; INTRAVENOUS at 17:11:00

## 2023-10-13 RX ADMIN — KETOROLAC TROMETHAMINE 30 MG: 30 INJECTION, SOLUTION INTRAMUSCULAR; INTRAVENOUS at 18:04:00

## 2023-10-13 RX ADMIN — CEFAZOLIN SODIUM/WATER 2 G: 2 G/20 ML SYRINGE (ML) INTRAVENOUS at 17:15:00

## 2023-10-13 RX ADMIN — ONDANSETRON 4 MG: 2 INJECTION INTRAMUSCULAR; INTRAVENOUS at 17:11:00

## 2023-10-13 RX ADMIN — SODIUM CHLORIDE, SODIUM LACTATE, POTASSIUM CHLORIDE, CALCIUM CHLORIDE: 600; 310; 30; 20 INJECTION, SOLUTION INTRAVENOUS at 18:04:00

## 2023-10-13 NOTE — ANESTHESIA POSTPROCEDURE EVALUATION
Patient: Delaney Jimenez    Procedure Summary       Date: 10/13/23 Room / Location: [address] [place] [address] MAIN OR 06 / [address] [place] [address] MAIN OR    Anesthesia Start: 8139 Anesthesia Stop:     Procedures:       Colposcopy of vulva, vagina and cervix, CO2 laser ablation of vulva (Vagina )      Cervical ablation with laser (Vulva) Diagnosis: (Vulvar dysplasia)    Surgeons: Kaia Julio DO Anesthesiologist: Toney Armenta MD    Anesthesia Type: general ASA Status: 2            Anesthesia Type: general    Vitals Value Taken Time   /117 10/13/23 1820   Temp 98.7 10/13/23 1822   Pulse 73 10/13/23 1821   Resp 21 10/13/23 1821   SpO2 99 % 10/13/23 1821   Vitals shown include unfiled device data. [address] [place] [address] AN Post Evaluation:   Patient Evaluated in PACU  Patient Participation: complete - patient participated  Level of Consciousness: awake  Pain Score: 0  Pain Management: adequate  Airway Patency:patent  Dental exam unchanged from preop  Yes    Cardiovascular Status: acceptable  Respiratory Status: acceptable  Postoperative Hydration acceptable  Comments:  Will treat BP with hydralazine       Michelle Hardy MD  10/13/2023 6:22 PM

## 2023-10-13 NOTE — OPERATIVE REPORT
[facility]  [facility] Note     Hi Velázquez Location: OR   CSN [id number] MRN [id number]   Admission Date 10/13/2023 Operation Date 10/13/2023   Attending Physician Willi Giles DO Operating Physician Senia Bustillos DO      Preoperative Diagnosis: Vulvar dysplasia     Postoperative Diagnosis: Vulvar dysplasia, cervical lesion, perianal lesion     Procedure Performed:   Colposcopy of vulva, vagina and cervix, CO2 laser ablation of vulva     Primary Surgeon: Senia Bustillos DO      Assistant: none     Surgical Findings: When the patient was seen in the office, she was on her period and her colposcopy of the cervix was somewhat limited. I did have a discussion with the patient in the preop holding as well as in the clinic that if I found something on her cervix I would biopsy, laser, or LEEP her cervix. Patient fully understood the risks benefits and agreed to this. Intra-Op I did identify a cervical lesion that appears to be at least mild to moderate dysplasia encompassing most of her transformation zone. This was most seen using acetic acid. Furthermore the patient had extensive amount of at least low to moderate grade dysplasia throughout the vagina, vaginal introitus, and vulva. In addition it was on the posterior aspect of the clitoris, and the tissue between the clitoris and the urethra. Lastly there was also acetowhite changes noted on the perianal region and therefore I did perform a small biopsy at the 4 o'clock position to confirm it. I did end up performing a small and shallow LEEP with an ECC. Almost the entire cervix is still present.      Anesthesia: General     Complications: none     Implants: * No implants in log *     Specimen: LEEP cone biopsy, ECC, perianal biopsy at the 4 o'clock position     Drains: none     Condition: Stable     Estimated Blood Loss: 5 cc    Urine Output[de-identified] Not performed    Fluids: 700 cc     Summary of Case:  Patient was taken back to the operating room and placed in a lithotomy position. A colposcopy was performed of the vulva, vaginal introitus, cervix, and anus. Findings were as noted. Patient was then prepared and draped in normal sterile fashion dorsolithotomy position. Next a speculum was then placed in the vagina and the cervix was then identified and acetic acid was then placed on the cervix again. The cervical lesion was reidentified showing extensive amount of acetowhite changes. I injected the cervix at the 3, 6, 9, 12 o'clock position of half percent Marcaine with epi approximately a total of 4 cc. I then used the LEEP to cauterize and transect the cervical lesion. I then used the Kevorkian to perform the ECC. I then used a ball-tipped cautery to cauterize the base and the outer edges of the cervical basin. Next I Put Monsel's on the cervix and observed it for approximately 5 minutes with excellent hemostasis. At this time I then placed vinegar and acetic acid again on the vulva and vagina and vulva and we identified all the lesions. I then began fulgurating using the laser at 8 W continuous throughout the entire vaginal introitus and external vulva. This included the perineum. I then did a biopsy using the [facility] at the [facility] anal region 4 o'clock position and lightly fulgurated the base. I then injected the entire region using half percent Marcaine throughout. I then placed Silvadene on her skin. Patient did very well and went to recovery in stable condition.        [address], DO  10/13/2023  6:08 PM

## 2023-10-13 NOTE — H&P
DIAGNOSIS:  LE I  HISTORY OF PRESENT ILLNESS:    Angela Pinedo is a very pleasant 38year old  female who initially presented for consultation secondary to newly diagnosed LE 1. Patient was seen 23, biopsies performed, negative, but due to findings on exam, Jose Luis advised. She presents today for follow up. Patient presented to PCP Dr. Arnaud Jackson on 2023 for annual exam with complaints of vulvar/vaginal lesion. Pap was collected at that time, results NILM/HPV negative. Patient was referred to gynecology for further evaluation. She was then seen by ob gyn Dr. Velez Sessions on 2023. On exam, pedunculated lesion noted at introitus, most consistent with wart. Patient requested excision of lesion, final pathology was consistent with LE 1/condyloma acuminatum. Patient was referred to gyn onc for further management. Jose Luis advised. Today, patient reports that she is doing well and has overall tolerated Aldara well. However, she does report that area that was previously biopsied does appear to have recurred. When she noted area was growing about 1 month ago, she began to use Aldara with q tip to this area. No other new lesions noted. Denies nausea/vomiting/fever/chills, no abdominal or pelvic pain, no abnormal vaginal bleeding or discharge. Menses are regular, no issues with intercourse. Bowel movements are regular, denies constipation/diarrhea. No urinary complaints. Appetite is stable. She is currently sexually active without complaints, has been in a monogamous relationship for 10 years. Patient denies any history of anal intertercourse. Patient denies a previous history of abnormal paps. She has not had the Gardasil vaccine. She is UTD on mammo, says that she needs to have a follow up [address] in 2023 due to abnormal finding in left breast. However, this was patient's first mammogram. She is a former smoker, stating that she quit 5 years ago.      PAST CANCER HISTORY:         PAST MEDICAL HISTORY:  LE I      PAST SURGICAL HISTORY:  Procedure: Rotator cuff repair, Note: Right shoulder  2023, Procedure: Biopsy (procedure), Note: Vaginal introitus bx @ 4:00 and 6:00      PAST OB-GYN HISTORY:  Menses Details: Last Period: 2023;  Pregnancy Details: : 0; Para: 0; #Living Children: 0;  OB/GYN Medication Hx: IUD: No; Other Contraceptive Hx: No;      PAST SOCIAL HISTORY:    (Reviewed - no changes required)  Marital: SINGLE  Smoking Status: Smoking Tobacco : Former smoker; Smokeless Tobacco : none found; Vaping : none found   Alcohol Consumption: None  Substance Abuse: None   Work History:            PAST FAMILY HISTORY:    Father: ; Mother:     CURRENT MEDICATIONS:      Montelukast Oral 10 mg tablet  Imiquimod Topical Packet 5 % 5 % cream in packet Apply to the affected area every Monday, Wednesday, and Friday night x16 weeks. Rinse off after 6-8 horus. Amphetamine-Dextroamphetamine Oral 30 mg tablet  Albuterol HFA Inhaler 90 mcg/actuation  Losartan Oral 50 mg tablet      ALLERGIES:    No known medication allergies    REVIEW OF SYSTEMS:    A 14 point review of systems was performed with any pertinent positives noted in the HPI and otherwise negative. VITAL SIGNS:    Blood pressure: 122/78, Sitting, R arm, Regular, Pulse: 122, Temperature: 97 F, Respirations: , O2 sat: 99%, At Rest, Room Air, Pain Scale: 0, Height: 61 in, Weight: 110 lb, BSA: 1.47, BMI: 20.78 kg/m2    PHYSICAL EXAM:    GENERAL: Alert and oriented x's 3. Well appearing female in NAD. HEENT: Pupils are equal and reactive without scleral icterus. Normal cephalic, atraumatic,   PERRLA. EOM's intact bilaterally. NECK: Trachea is midline. Supple, no LAD, no thyromegaly. BACK: No CVA or spinous tenderness. LUNGS: CTAB, no wheezing. CARDIAC: RRR, no murmurs. ABDOMEN: Soft NTND, BS x's 4. No guarding or rebound tenderness. :    External Genitalia: Colposcopy performed.  Site of previous biopsy noted above the clitoral mary on the right. In this area today, patient noted to have 2 small warts. Furthermore, acetowhite changes noted at the vaginal introitus extending up the medial aspect of the labia minora bilaterally. Appears consistent with low/moderate grade dysplasia, overall persistent from previous visit, despite compliance with Aldara. No evidence of high grade lesions. Urethral Meatus: No lesions. Urethra: No masses no nodularity. Bladder: Fullness, no masses, no tenderness, no scarring. Vagina: Smooth vaginal mucosa. Cervix: Normal appearing cervix. Cervix significantly tilted posteriorly. Patient on menses. Uterus: Uterus is normal size, mobile. Adnexa: Do not appreciate any pelvic masses or nodularity. Parametria: No nodularity  RECTAL: Deferred. EXTREMITIES: No clubbing, cyanosis, or edema bilaterally. NEUROLOGIC: Cranial nerves are grossly intact bilaterally. PSYCHIATRIC: Appropriate mood and affect. ECOG:          LABS/PATHOLOGY:      Pap smear 4/6/23: NILM/HPV negative    5/1/23:  Vulva, biopsy:  - Low-grade squamous intraepithelial lesion (LE 1 / condyloma acuminatum). 5/31/23:  Vulvar, biopsy:  A. Vaginal introitus, 4:00:  -Squamous mucosa negative for dysplasia (See comment). B.  Vaginal introitus, 6:00:  -Squamous mucosa negative for dysplasia (See comment)        DIAGNOSTIC STUDIES REVIEWED:    None available for review today        HEALTH MAINTENANCE:    Immunizations:      Screening: Mammogram - Screening (bilateral) on 04/2023; Pap Smear on 04/06/2023; Pap Smear on 05/31/2023     ASSESSMENT AND PLAN:  LE I: Patient is a very pleasant 38year old [de-identified] female who presents for consultation secondary to newly diagnosed LE 1. Patient presented to PCP Dr. Codie Rankin on 4/6/2023 for annual exam with complaints of vulvar/vaginal lesion. Pap was collected at that time, results NILM/HPV negative.  Patient was referred to gynecology for further evaluation. She was then seen by ob gyn Dr. Ayse Washington on 5/1/2023. On exam, pedunculated lesion noted at introitus, most consistent with wart. Patient requested excision of lesion, final pathology was consistent with LE 1/condyloma acuminatum. Patient was referred to gyn onc for further management. At time of consult, colposcopy performed. Findings do appear consistent with mild/moderate dysplasia. Vulvar pap and biopsies x2 collected today, all negative. Despite negative results, based on exam findings, patient was advised to use Aldara 3 x weekly. Patient has been compliant with this. Today, on exam, findings are essentially the same as previous exam, and 2 new warts present at area of previous biopsy. Discussed with patient findings, and no evidence of high grade lesions, however, as she failed to respond to Aldara, would advise laser to area. Did discuss will also plan for colposcopy, possible biopsies at that time. Reviewed patient details of procedure, along with risks associated with it. Will plan to proceed in near future, and follow up post operatively. patient in full agreement. HPV: I did review the fact that there are multiple subtypes within the HPV family including low risk, intermediate risk and high risk viruses. I did explain that low risk viruses tend to be associated with genital warts while high risk viruses can predispose her to a malignancy. I did state that most but not all infections occur sexually. I did explain the association of this virus with head and neck cancers, vaginal cancers, cervical cancers, vulvar cancers, as well as anal cancers. I did explain that of course in the ideal world our goal is to go for the cure, however this goal may not always be achievable especially after the patient is in her mid to late 25s and beyond. Instead I explained that my goal is to prevent the progression of the dysplasia from becoming an invasive malignancy.   I did explain unequivocally that this is a chronic condition that we will need long-term surveillance. Patient has been authorized, plan to proceed at post op evaluation.

## 2023-10-13 NOTE — ANESTHESIA PROCEDURE NOTES
Airway  Date/Time: 10/13/2023 5:12 PM  Urgency: Elective      General Information and Staff    Patient location during procedure: OR  Anesthesiologist: Glenda Sanz MD  Performed: anesthesiologist   Performed by: Glenda Sanz MD  Authorized by: Glenda Sanz MD      Indications and Patient Condition  Indications for airway management: anesthesia  Spontaneous ventilation: present  Sedation level: deep  Preoxygenated: yes  Patient position: sniffing  Mask difficulty assessment: 1 - vent by mask    Final Airway Details  Final airway type: supraglottic airway      Successful airway: classic  Size 4       Number of attempts at approach: 1

## 2023-11-03 ENCOUNTER — MED REC SCAN ONLY (OUTPATIENT)
Dept: OBGYN CLINIC | Facility: CLINIC | Age: 43
End: 2023-11-03

## 2023-11-05 ENCOUNTER — MED REC SCAN ONLY (OUTPATIENT)
Dept: OBGYN CLINIC | Facility: CLINIC | Age: 43
End: 2023-11-05

## 2023-11-06 ENCOUNTER — TELEPHONE (OUTPATIENT)
Dept: OBGYN CLINIC | Facility: CLINIC | Age: 43
End: 2023-11-06

## 2023-11-06 NOTE — TELEPHONE ENCOUNTER
Incoming fax with medical records received from [facility]. Records placed in [address] [place] [address] for review.

## 2023-11-08 DIAGNOSIS — F98.8 ATTENTION DEFICIT DISORDER (ADD) WITHOUT HYPERACTIVITY: ICD-10-CM

## 2023-11-08 NOTE — TELEPHONE ENCOUNTER
Please review. Protocol failed/has no protocol. Requested Prescriptions   Pending Prescriptions Disp Refills    AMPHETAMINE-DEXTROAMPHETAMINE ER 30 MG Oral Capsule SR 24 Hr [Pharmacy Med Name: AMPHETAMINE-DEXTROAMPHET ER 30 MG ORAL CAPSULE EXTENDED RELEASE 24 HOUR] 30 capsule 0     Sig: Take 1 capsule (30 mg total) by mouth daily.        There is no refill protocol information for this order                Recent Outpatient Visits              2 months ago Membranous nephropathy 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    Tyrel Webb MD    Office Visit    4 months ago Mild persistent asthma, unspecified whether complicated    [facility] Brock Gongora DO    Office Visit    6 months ago Vulvar lesion    [address] [facility],[address], [facility], [facility] Josie Will MD    Office Visit    7 months ago [address], Norrisvania Dawson PA-C    Office Visit    7 months ago Well adult exam    Jennifer Bullock, Eric Rivas MD    Office Visit

## 2023-11-10 RX ORDER — DEXTROAMPHETAMINE SACCHARATE, AMPHETAMINE ASPARTATE MONOHYDRATE, DEXTROAMPHETAMINE SULFATE AND AMPHETAMINE SULFATE 7.5; 7.5; 7.5; 7.5 MG/1; MG/1; MG/1; MG/1
30 CAPSULE, EXTENDED RELEASE ORAL DAILY
Qty: 30 CAPSULE | Refills: 0 | Status: SHIPPED | OUTPATIENT
Start: 2023-11-10 | End: 2023-12-10

## 2023-12-07 NOTE — PROGRESS NOTES
12/07/23 1006   OTHER   Discipline speech language pathologist   Rehab Time/Intention   Session Not Performed other (see comments)  (Discussed with RN. Patient not appropriate for swallow re-evaluation this date. Recommend alternate means of nutrition. SLP to follow for re-eval as overall status improves.RN in agreement.)        Patient ID: Brayan Rust is a 41year old female. HPI  Patient presents with:  Hypertension: follow up/medication refills    The patient works the  at a veterinary clinic. The patient is on losartan 50 mg daily.  She saw nephrologist  Cardiovascular: Negative for chest pain. Gastrointestinal: Negative for abdominal pain. Skin: Positive for rash. Negative for color change. Neurological: Negative for speech difficulty. Psychiatric/Behavioral: The patient is not nervous/anxious. the back of the neck under the hair line; macular papular rash of the bilateral antecubital fossa and bilateral popliteal fossa   Psychiatric: Patient has a normal mood and affect. Lower legs: No edema of the legs bilaterally.   Nursing note and vitals re Capsule SR 24 Hr; Take 1 capsule (30 mg total) by mouth daily.         Referrals (if applicable)  Orders Placed This [address] [place] [address] (Dr VAZQUEZ or Dr Randall Meier)          Order Comments:              Please see  Dr. Jaime Esquivel or Dr. eKyla Lugo for [address] [place] [address] presence of James Gong DO. Electronically Signed: Ritchie Flores, 11/4/2019, 9:25 AM.    I, James Gong DO,  personally performed the services described in this documentation.  All medical record entries made by the scribe were at my direction

## 2023-12-08 DIAGNOSIS — I10 ESSENTIAL HYPERTENSION: ICD-10-CM

## 2023-12-08 DIAGNOSIS — F98.8 ATTENTION DEFICIT DISORDER (ADD) WITHOUT HYPERACTIVITY: ICD-10-CM

## 2023-12-09 RX ORDER — DEXTROAMPHETAMINE SACCHARATE, AMPHETAMINE ASPARTATE MONOHYDRATE, DEXTROAMPHETAMINE SULFATE AND AMPHETAMINE SULFATE 7.5; 7.5; 7.5; 7.5 MG/1; MG/1; MG/1; MG/1
30 CAPSULE, EXTENDED RELEASE ORAL DAILY
Qty: 30 CAPSULE | Refills: 0 | Status: SHIPPED | OUTPATIENT
Start: 2023-12-10 | End: 2024-01-08

## 2023-12-09 RX ORDER — LOSARTAN POTASSIUM 50 MG/1
50 TABLET ORAL DAILY
Qty: 90 TABLET | Refills: 0 | Status: SHIPPED | OUTPATIENT
Start: 2023-12-09 | End: 2024-01-08

## 2023-12-09 NOTE — TELEPHONE ENCOUNTER
Refilled times 1 but needs appointment before the next prescription will be filled.  Please call patient and set up an office visit as overdue for ADD as a controlled substance.  She can even do a video visit..

## 2023-12-09 NOTE — TELEPHONE ENCOUNTER
Please review. Protocol failed or has no protocol.    Requested Prescriptions   Pending Prescriptions Disp Refills    amphetamine-dextroamphetamine ER 30 MG Oral Capsule SR 24 Hr [Pharmacy Med Name: AMPHETAMINE-DEXTROAMPHET ER 30 MG ORAL CAPSULE EXTENDED RELEASE 24 HOUR] 30 capsule 0     Sig: Take 1 capsule (30 mg total) by mouth daily.       There is no refill protocol information for this order       LOSARTAN 50 MG Oral Tab [Pharmacy Med Name: LOSARTAN POTASSIUM 50 MG ORAL TABLET] 90 tablet 2     Sig: TAKE 1 TABLET (50 MG TOTAL) BY MOUTH DAILY.       Hypertensive Medications Protocol Failed - 12/8/2023  1:14 PM        Failed - Last BP reading less than 140/90     BP Readings from Last 1 Encounters:   10/13/23 145/85               Passed - In person appointment in the past 12 or next 3 months     Recent Outpatient Visits              3 months ago Membranous nephropathy 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    [facility] Reji Shrestha MD    Office Visit    5 months ago Mild persistent asthma, unspecified whether complicated    [facility], Neymar Vera DO    Office Visit    7 months ago Vulvar lesion    [facility] - OB/GYN Uziel Mcmanus MD    Office Visit    8 months ago Intrinsic eczema    [facility] Subhash Peace PA-C    Office Visit    8 months ago Well adult exam    [facility] Shirley Ochoa MD    Office Visit                      Passed - CMP or BMP in past 6 months     Recent Results (from the past 4392 hour(s))   Comp Metabolic Panel (14)    Collection Time: 10/09/23  4:10 PM   Result Value Ref Range    Glucose 97 70 - 99 mg/dL    Sodium 138 136 - 145 mmol/L    Potassium 4.1 3.5 - 5.1 mmol/L    Chloride 106 98 - 112 mmol/L    CO2 28.0 21.0 - 32.0 mmol/L    Anion Gap 4 0 - 18 mmol/L    BUN 8 7  - 18 mg/dL    Creatinine 0.78 0.55 - 1.02 mg/dL    BUN/CREA Ratio 10.3 10.0 - 20.0    Calcium, Total 9.4 8.5 - 10.1 mg/dL    Calculated Osmolality 284 275 - 295 mOsm/kg    eGFR-Cr 98 >=60 mL/min/1.73m2    ALT 20 13 - 56 U/L    AST 14 (L) 15 - 37 U/L    Alkaline Phosphatase 64 37 - 98 U/L    Bilirubin, Total 0.3 0.1 - 2.0 mg/dL    Total Protein 7.2 6.4 - 8.2 g/dL    Albumin 3.9 3.4 - 5.0 g/dL    Globulin  3.3 2.8 - 4.4 g/dL    A/G Ratio 1.2 1.0 - 2.0    Patient Fasting for CMP? No      *Note: Due to a large number of results and/or encounters for the requested time period, some results have not been displayed. A complete set of results can be found in Results Review.               Passed - In person appointment or virtual visit in the past 6 months     Recent Outpatient Visits              3 months ago Membranous nephropathy 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    [facility] Reji Shrestha MD    Office Visit    5 months ago Mild persistent asthma, unspecified whether complicated    [facility] Neymar Max DO    Office Visit    7 months ago Vulvar lesion    [facility] - OB/GYN Uziel Mcmanus MD    Office Visit    8 months ago [facility]insic eczema    [facility]    Office Visit    8 months ago Well adult exam    [facility] Shirley Ochoa MD    Office Visit                      Passed - EGFRCR or GFRNAA > 50     GFR Evaluation  EGFRCR: 98 , resulted on 10/9/2023               Recent Outpatient Visits              3 months ago Membranous nephropathy 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    [facility] Reji Shrestha MD    Office Visit    5 months ago Mild persistent asthma, unspecified whether complicated    [facility]  Neymar Max DO    Office Visit    7 months ago Vulvar lesion    [facility] - OB[facility]GYN Uziel Mcmanus MD    Office Visit    8 months ago Intrinsic eczema    [facility] Subhash Peace PA-C    Office Visit    8 months ago Well adult exam    [facility], [facility] Shirley Ochoa MD    Office Visit

## 2023-12-12 NOTE — TELEPHONE ENCOUNTER
1st attempt/MyChart message was sent to the patient to schedule an appointment per the below request.

## 2024-01-08 ENCOUNTER — OFFICE VISIT (OUTPATIENT)
Dept: FAMILY MEDICINE CLINIC | Facility: CLINIC | Age: 44
End: 2024-01-08

## 2024-01-08 VITALS
WEIGHT: 107 LBS | HEIGHT: 62 IN | HEART RATE: 85 BPM | DIASTOLIC BLOOD PRESSURE: 87 MMHG | SYSTOLIC BLOOD PRESSURE: 136 MMHG | BODY MASS INDEX: 19.69 KG/M2 | TEMPERATURE: 97 F

## 2024-01-08 DIAGNOSIS — N02.2 MEMBRANOUS NEPHROPATHY DETERMINED BY BIOPSY: ICD-10-CM

## 2024-01-08 DIAGNOSIS — L20.82 FLEXURAL ECZEMA: ICD-10-CM

## 2024-01-08 DIAGNOSIS — R80.9 NON-NEPHROTIC RANGE PROTEINURIA: ICD-10-CM

## 2024-01-08 DIAGNOSIS — E55.9 VITAMIN D DEFICIENCY: ICD-10-CM

## 2024-01-08 DIAGNOSIS — L30.9 DERMATITIS: ICD-10-CM

## 2024-01-08 DIAGNOSIS — I10 ESSENTIAL HYPERTENSION: Primary | ICD-10-CM

## 2024-01-08 DIAGNOSIS — F98.8 ATTENTION DEFICIT DISORDER (ADD) WITHOUT HYPERACTIVITY: ICD-10-CM

## 2024-01-08 PROCEDURE — 3008F BODY MASS INDEX DOCD: CPT | Performed by: FAMILY MEDICINE

## 2024-01-08 PROCEDURE — 99214 OFFICE O/P EST MOD 30 MIN: CPT | Performed by: FAMILY MEDICINE

## 2024-01-08 PROCEDURE — 3075F SYST BP GE 130 - 139MM HG: CPT | Performed by: FAMILY MEDICINE

## 2024-01-08 PROCEDURE — 3079F DIAST BP 80-89 MM HG: CPT | Performed by: FAMILY MEDICINE

## 2024-01-08 RX ORDER — TRIAMCINOLONE ACETONIDE 1 MG/G
1 OINTMENT TOPICAL 2 TIMES DAILY
Qty: 30 G | Refills: 0 | Status: SHIPPED | OUTPATIENT
Start: 2024-01-08

## 2024-01-08 RX ORDER — ERGOCALCIFEROL 1.25 MG/1
50000 CAPSULE ORAL WEEKLY
Qty: 12 CAPSULE | Refills: 1 | Status: SHIPPED | OUTPATIENT
Start: 2024-01-08

## 2024-01-08 RX ORDER — LOSARTAN POTASSIUM 50 MG/1
50 TABLET ORAL DAILY
Qty: 90 TABLET | Refills: 2 | Status: SHIPPED | OUTPATIENT
Start: 2024-01-08

## 2024-01-08 RX ORDER — DEXTROAMPHETAMINE SACCHARATE, AMPHETAMINE ASPARTATE MONOHYDRATE, DEXTROAMPHETAMINE SULFATE AND AMPHETAMINE SULFATE 7.5; 7.5; 7.5; 7.5 MG/1; MG/1; MG/1; MG/1
30 CAPSULE, EXTENDED RELEASE ORAL DAILY
Qty: 30 CAPSULE | Refills: 0 | Status: SHIPPED | OUTPATIENT
Start: 2024-01-08 | End: 2024-02-07

## 2024-01-08 NOTE — PROGRESS NOTES
Patient ID: Khushboo Milan is a 43 year old female.    Medication Follow-Up  Pertinent negatives include no chest pain.     Chief Complaint   Patient presents with    Medication Follow-Up     Last seen by me on 07/10/2023.    Pt sees Dr. Damon Linares, nephrologist; hx proteinuria and nephropathy. I provided a referral to her. She is taking losartan 50 mg for BP and amphetamine-dextroamphetamine 30 mg for ADD. Pt uses triamcinolone 0.1 % cream as needed for eczema. I reviewed her labs; she had a normal CMP in October 2023. I discussed monitoring BP at home before she sees Dr. Damon Linares, nephrologist. States that her asthma has been okay; she uses albuterol as needed. Her ACT is 20.     States she saw Subhash Peace PA-C for possible eczema last year and was given a steroid ointment. She used it without relief. I provided a referral to a dermatologist as she states that the rash on her legs is worse.  At times it can itch.  It is quite discolored.  There is no pain..       Wt Readings from Last 6 Encounters:   01/08/24 107 lb   10/10/23 112 lb   08/21/23 112 lb   07/10/23 111 lb 9.6 oz   05/01/23 106 lb 3.2 oz   04/06/23 109 lb       BMI Readings from Last 6 Encounters:   01/08/24 19.57 kg/m²   10/10/23 20.49 kg/m²   08/21/23 20.49 kg/m²   07/10/23 20.41 kg/m²   05/01/23 19.42 kg/m²   04/06/23 19.94 kg/m²       BP Readings from Last 6 Encounters:   01/08/24 136/87   10/13/23 145/85   08/21/23 127/87   07/10/23 122/90   05/01/23 (!) 158/106   04/06/23 136/84         Review of Systems   Respiratory:  Negative for shortness of breath.    Cardiovascular:  Negative for chest pain.           Medical History:      Past Medical History:   Diagnosis Date    Asthma     Attention deficit disorder     Extrinsic asthma, unspecified     High blood pressure     Kidney disease     Renal disorder     protein in urine    Unspecified essential hypertension     Verruca vulgaris 07/2020    Central Pubic Area       Past Surgical History:    Procedure Laterality Date    OTHER      KIDNEY BIOPSY    SHOULDER ARTHROSCOPY Right 2018    Rotator cuff repair          Current Outpatient Medications   Medication Sig Dispense Refill    amphetamine-dextroamphetamine ER 30 MG Oral Capsule SR 24 Hr Take 1 capsule (30 mg total) by mouth daily. 30 capsule 0    losartan 50 MG Oral Tab Take 1 tablet (50 mg total) by mouth daily. 90 tablet 0    docusate sodium 100 MG Oral Cap Take 1 capsule (100 mg total) by mouth 2 (two) times daily as needed for constipation. 60 capsule 0    ibuprofen 600 MG Oral Tab Take 1 tablet (600 mg total) by mouth every 8 (eight) hours as needed for Pain. 60 tablet 0    Vitamin C 500 MG Oral Tab Take 1 tablet (500 mg total) by mouth daily.      Zinc 50 MG Oral Tab Take 1 tablet by mouth daily.      magnesium 250 MG Oral Tab Take 1 tablet (250 mg total) by mouth daily.      BIOTIN OR Take 1 tablet by mouth daily.      albuterol 108 (90 Base) MCG/ACT Inhalation Aero Soln INHALE TWO PUFFS INTO THE LUNGS EVERY 4 (FOUR) HOURS AS NEEDED FOR WHEEZING OR SHORTNESS OF BREATH 8.5 g 2    ergocalciferol 1.25 MG (56109 UT) Oral Cap Take 1 capsule (50,000 Units total) by mouth once a week. 12 capsule 1    mometasone 0.1 % External Ointment Apply 1 Application topically daily. 30 g 3    fluocinonide 0.05 % External Ointment Apply 1 Application topically 2 (two) times daily. 60 g 2    fluticasone propionate 50 MCG/ACT Nasal Suspension 2 sprays by Each Nare route daily. 3 each 2    triamcinolone 0.1 % External Ointment Apply 1 Application. topically 2 (two) times daily. 30 g 0    cyanocobalamin 100 MCG Oral Tab Take 2.5 tablets (250 mcg total) by mouth daily.      Collagen 500 MG Oral Cap Take by mouth.      HYDROcodone-acetaminophen 5-325 MG Oral Tab Take 1 tablet by mouth every 6 (six) hours as needed for Pain. (Patient not taking: Reported on 1/8/2024) 20 tablet 0    lidocaine 5 % External Ointment Apply 1 Application topically as needed. (Patient not  taking: Reported on 1/8/2024) 60 g 0     Allergies:No Known Allergies     Physical Exam:       Physical Exam  Blood pressure (!) 153/92, pulse 85, temperature 97.1 °F (36.2 °C), height 5' 2\" (1.575 m), weight 107 lb, last menstrual period 10/03/2023, not currently breastfeeding.    Vitals:    01/08/24 0940 01/08/24 0950   BP: (!) 153/92 136/87   Pulse: 85    Temp: 97.1 °F (36.2 °C)    Weight: 107 lb    Height: 5' 2\" (1.575 m)        Physical Exam   Constitutional: Patient is oriented to person, place, and time. Patient appears well-developed and well-nourished. No distress.   Head: Normocephalic.   Eyes: Conjunctivae and EOM are normal.   Neck: Normal range of motion. No thyromegaly present.   Cardiovascular: Normal rate, regular rhythm and normal heart sounds.    Pulmonary/Chest: Effort normal and breath sounds normal. No respiratory distress.   Lymphadenopathy: Patient has no cervical adenopathy.  Neurological: Patient is alert and oriented to person, place, and time.   Skin: Skin is warm. Slightly raised, tan cayenne pepper spots all over the shins SMITH, possibly Schamberg's disease.  Nonblanching.  Psychiatry: Normal mood and affect.  Lower legs: No edema of the legs bilaterally.    Vitals reviewed.           Assessment/Plan:      Diagnoses and all orders for this visit:    Essential hypertension  -     losartan 50 MG Oral Tab; Take 1 tablet (50 mg total) by mouth daily.  Blood pressure much better.  Do home blood pressures as well and continue present management.  Vitamin D deficiency  -     ergocalciferol 1.25 MG (26604 UT) Oral Cap; Take 1 capsule (50,000 Units total) by mouth once a week.    Flexural eczema  -     triamcinolone 0.1 % External Ointment; Apply 1 Application  topically 2 (two) times daily.  She does have areas of eczema periodically and triamcinolone helps.  Attention deficit disorder (ADD) without hyperactivity  -     amphetamine-dextroamphetamine ER 30 MG Oral Capsule SR 24 Hr; Take 1 capsule  (30 mg total) by mouth daily.  No chest pain, shortness of breath or palpitations  Non-nephrotic range proteinuria  -     NEPHROLOGY - INTERNAL    Membranous nephropathy 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  -     NEPHROLOGY - INTERNAL    Dermatitis  -     DERM - INTERNAL  I wonder if this is not Schamberg's disease on the legs.  She should see the a physician in the dermatology department.      Referrals (if applicable)  Orders Placed This Encounter   Procedures    NEPHROLOGY - INTERNAL     Referral Priority:   Routine     Referral Type:   OFFICE VISIT     Referred to Provider:   Reji Shrestha MD     Requested Specialty:   NEPHROLOGY     Number of Visits Requested:   3    DERM - INTERNAL     If he is busy, you may see one of his partners.    She has cayenne pepper hyperpigmented rash on both shins that is quite large.  It is actually slightly raised.  The steroid cream that was given in the past did not help.  Please further evaluate.  Is this Schamberg's disease?     Referral Priority:   Routine     Referral Type:   OFFICE VISIT     Referred to Provider:   Ian Esparza MD     Requested Specialty:   DERMATOLOGY     Number of Visits Requested:   3       Follow up if symptoms persist.  Take medicine (if given) as prescribed.  Approach to treatment discussed and patient/family member understands and agrees to plan.     Return in about 3 months (around 4/8/2024) for Physical Exam.    There are no Patient Instructions on file for this visit.    Geoffrey Reza    1/8/2024    By signing my name below, IGeoffrey,  attest that this documentation has been prepared under the direction and in the presence of Neymar Max DO.   Electronically Signed: Geoffrey Reza, 1/8/2024, 9:43 AM.    I, Neymar Max DO,  personally performed the services described in this documentation. All medical record entries made by the scribe were at my direction and in my presence.  I have reviewed the chart and discharge instructions (if  applicable) and agree that the record reflects my personal performance and is accurate and complete.  Neymar Max DO, 1/8/2024, 10:37 AM

## 2024-02-26 ENCOUNTER — OFFICE VISIT (OUTPATIENT)
Dept: NEPHROLOGY | Facility: CLINIC | Age: 44
End: 2024-02-26

## 2024-02-26 ENCOUNTER — LAB ENCOUNTER (OUTPATIENT)
Dept: LAB | Facility: HOSPITAL | Age: 44
End: 2024-02-26
Attending: INTERNAL MEDICINE
Payer: COMMERCIAL

## 2024-02-26 VITALS
BODY MASS INDEX: 20.06 KG/M2 | WEIGHT: 109 LBS | DIASTOLIC BLOOD PRESSURE: 82 MMHG | SYSTOLIC BLOOD PRESSURE: 139 MMHG | HEART RATE: 66 BPM | HEIGHT: 62 IN

## 2024-02-26 DIAGNOSIS — R80.9 NON-NEPHROTIC RANGE PROTEINURIA: ICD-10-CM

## 2024-02-26 DIAGNOSIS — N02.2 MEMBRANOUS NEPHROPATHY DETERMINED BY BIOPSY: Primary | ICD-10-CM

## 2024-02-26 DIAGNOSIS — N02.2 MEMBRANOUS NEPHROPATHY DETERMINED BY BIOPSY: ICD-10-CM

## 2024-02-26 LAB
ANION GAP SERPL CALC-SCNC: 6 MMOL/L (ref 0–18)
BILIRUB UR QL: NEGATIVE
BUN BLD-MCNC: 14 MG/DL (ref 9–23)
BUN/CREAT SERPL: 12.6 (ref 10–20)
CALCIUM BLD-MCNC: 9.3 MG/DL (ref 8.7–10.4)
CHLORIDE SERPL-SCNC: 108 MMOL/L (ref 98–112)
CLARITY UR: CLEAR
CO2 SERPL-SCNC: 27 MMOL/L (ref 21–32)
COLOR UR: YELLOW
CREAT BLD-MCNC: 1.11 MG/DL
CREAT UR-SCNC: 182.6 MG/DL
EGFRCR SERPLBLD CKD-EPI 2021: 64 ML/MIN/1.73M2 (ref 60–?)
FASTING STATUS PATIENT QL REPORTED: NO
GLUCOSE BLD-MCNC: 80 MG/DL (ref 70–99)
GLUCOSE UR-MCNC: NORMAL MG/DL
HGB UR QL STRIP.AUTO: NEGATIVE
KETONES UR-MCNC: NEGATIVE MG/DL
LEUKOCYTE ESTERASE UR QL STRIP.AUTO: NEGATIVE
MICROALBUMIN UR-MCNC: 4.8 MG/DL
MICROALBUMIN/CREAT 24H UR-RTO: 26.3 UG/MG (ref ?–30)
NITRITE UR QL STRIP.AUTO: NEGATIVE
OSMOLALITY SERPL CALC.SUM OF ELEC: 291 MOSM/KG (ref 275–295)
PH UR: 5.5 [PH] (ref 5–8)
POTASSIUM SERPL-SCNC: 4.2 MMOL/L (ref 3.5–5.1)
SODIUM SERPL-SCNC: 141 MMOL/L (ref 136–145)
SP GR UR STRIP: 1.02 (ref 1–1.03)
UROBILINOGEN UR STRIP-ACNC: NORMAL

## 2024-02-26 PROCEDURE — 80048 BASIC METABOLIC PNL TOTAL CA: CPT

## 2024-02-26 PROCEDURE — 36415 COLL VENOUS BLD VENIPUNCTURE: CPT

## 2024-02-26 PROCEDURE — 81003 URINALYSIS AUTO W/O SCOPE: CPT

## 2024-02-26 PROCEDURE — 3079F DIAST BP 80-89 MM HG: CPT | Performed by: INTERNAL MEDICINE

## 2024-02-26 PROCEDURE — 3075F SYST BP GE 130 - 139MM HG: CPT | Performed by: INTERNAL MEDICINE

## 2024-02-26 PROCEDURE — 3008F BODY MASS INDEX DOCD: CPT | Performed by: INTERNAL MEDICINE

## 2024-02-26 PROCEDURE — 82043 UR ALBUMIN QUANTITATIVE: CPT

## 2024-02-26 PROCEDURE — 82570 ASSAY OF URINE CREATININE: CPT

## 2024-02-26 PROCEDURE — 99215 OFFICE O/P EST HI 40 MIN: CPT | Performed by: INTERNAL MEDICINE

## 2024-02-26 NOTE — PROGRESS NOTES
Progress Note     Khushboo Milan is a 43 yrs old female with pmh of Asthma, Hypertension, HL, membranous nephropathy with non nephrotic range proteinuria, s/p colposcopy with laser ablation who presented today for follow up    Per patient she is known to have proteinuria since 2002 and was found out at [facility]. She had a kidney biopsy there ( Dr. Mar, [employer] ) and was started on enalapril. She was followed by Nephrologist till 2007 when she quit job there and didn't followed up. She doesn't recall the results of kidney biopsy, doesn't recall getting any steroid or immunosuppressant medication. We contacted Dr. Mar office but the records regarding her visit and kidney biopsy have been discarded.    Denies any rash, joint aches, abdominal pain, NVD, no hematochezia, cough, sob, leg swelling, fever, chills. + smoking - a pack will last for a week. Consume alcohol socially. No OTC herbal medication, NSAIDs. denies any leg swelling, frothy urine or gross hematuria     Her lab results showed normal RF, ESR, CRP, C3, C4, SPEP and UPEP. She had negative HIV, hepatitis B and C, ANCA. Had approx 2.3 grams proteinuria on 24hr urine test. She had kidney biopsy done on 4/13/15 which showed membranous nephropathy with IgG staining (2+/4+) and focal global and focal segmental glomerular scarring.     \"Compared to the previous kidney biopsy (see [id number]) from 2002, the extent of tubulointerstitial scarring is mildly increased in the current biopsy. The presence of mesangial immune deposits and particularly the segmental distribution of subepithelial deposits is consistent with a secondary form of membranous nephropathy\"     Quit smoking. No urinary complaints or leg swelling. Weight stable. working as veternary technician    HISTORY:  Past Medical History:   Diagnosis Date    Asthma (HCC)     Attention deficit disorder     Extrinsic asthma, unspecified     High blood pressure     Kidney disease     Renal  disorder     protein in urine    Unspecified essential hypertension     Verruca vulgaris 07/2020    Central Pubic Area      Past Surgical History:   Procedure Laterality Date    OTHER      KIDNEY BIOPSY    SHOULDER ARTHROSCOPY Right 2018    Rotator cuff repair           Medications (Active prior to today's visit):  Current Outpatient Medications   Medication Sig Dispense Refill    Amphetamine-Dextroamphet ER (ADDERALL XR) 20 MG Oral Capsule SR 24 Hr Take 1 capsule (20 mg total) by mouth every morning. 30 capsule 0    ergocalciferol 1.25 MG (63483 UT) Oral Cap Take 1 capsule (50,000 Units total) by mouth once a week. 12 capsule 1    losartan 50 MG Oral Tab Take 1 tablet (50 mg total) by mouth daily. 90 tablet 2    triamcinolone 0.1 % External Ointment Apply 1 Application  topically 2 (two) times daily. 30 g 0    Vitamin C 500 MG Oral Tab Take 1 tablet (500 mg total) by mouth daily.      Zinc 50 MG Oral Tab Take 1 tablet by mouth daily.      magnesium 250 MG Oral Tab Take 1 tablet (250 mg total) by mouth daily.      BIOTIN OR Take 1 tablet by mouth daily.      albuterol 108 (90 Base) MCG/ACT Inhalation Aero Soln INHALE TWO PUFFS INTO THE LUNGS EVERY 4 (FOUR) HOURS AS NEEDED FOR WHEEZING OR SHORTNESS OF BREATH 8.5 g 2    fluticasone propionate 50 MCG/ACT Nasal Suspension 2 sprays by Each Nare route daily. 3 each 2    cyanocobalamin 100 MCG Oral Tab Take 2.5 tablets (250 mcg total) by mouth daily.      Collagen 500 MG Oral Cap Take by mouth.         Allergies:  No Known Allergies      ROS:     Constitutional: Negative for decreased activity, fever, irritability and lethargy  ENMT: Negative for ear drainage, hearing loss and nasal drainage  Eyes: Negative for eye discharge and vision loss  Cardiovascular: Negative for chest pain, sob, irregular heartbeat/palpitations  Respiratory: Negative for cough, dyspnea and wheezing  Gastrointestinal: Negative for abdominal pain, constipation, decreased appetite  Genitourinary:  Negative for dysuria and hematuria  Endocrine: Negative for abnormal sleep patterns, increased activity  Hema/Lymph: Negative for easy bleeding and easy bruising  Integumentary: Negative for pruritus and rash  Musculoskeletal: Negative for bone/joint symptoms  Neurological: Negative for gait disturbance  Psychiatric: Negative for inappropriate interaction       Vitals:    02/26/24 1131   BP: 139/82   Pulse: 66     Wt Readings from Last 6 Encounters:   02/26/24 109 lb (49.4 kg)   01/08/24 107 lb (48.5 kg)   10/10/23 112 lb (50.8 kg)   08/21/23 112 lb (50.8 kg)   07/10/23 111 lb 9.6 oz (50.6 kg)   05/01/23 106 lb 3.2 oz (48.2 kg)       PHYSICAL EXAM:   Constitutional: appears well hydrated alert and responsive no acute distress noted  Head/Face: normocephalic  Eyes/Vision: normal extraocular motion is intact  Nose/Mouth/Throat:mucous membranes are moist  Neck/Thyroid: neck is supple   Skin/Hair: no abnormal bruising noted  Back/Spine: no abnormalities noted  Extremities: no edema  Neurological: Grossly normal       ASSESSMENT/PLAN:     1. Biopsy proven Membranous nephropathy with preserved kidney function  - UA + microscopic hematuria and proteinuria with 24hrs urine protein around 2.3 grams -> 1.5gm -> <100 mg and stable  - repeat UA trace protein with no RBC  - BUN/Cr 11/0.84 mg/dl with an eGFR 90 ml/min ->1.1 mg/dl . Increase water intake and repeat in 2 weeks  - hepatitis B, C and HIV negative and normal RF, ESR, CRP, C3, C4, SPEP and UPEP, ENEDINA, ANCA  - though biopsy showed concern for secondary membranous all her hayward so far has been negative  - had recent pap smear. CXR negative for any lung pathology  - goal is to minimize proteinuria for renoprotective purpose and to preserve kidney function   - on losartan 50 mg.   - no indication for treatment with immunosuppressive agent unless worsening proteinuria or renal function   - stable kidney function with creatinine 0.85 mg/dl with an eGFR 86 ml/min  - PLA2R  negative and anti GBM negative     2. Hypertension: BP stable  - Goal BP is < 130/80 given proteinuria and currently at goal  - off of enalapril for cough and on losartan 50 mg   - cont.current treatment     Follow up in 9 months    Call to order lab test prior to next visit     Orders This Visit:  Orders Placed This Encounter   Procedures    Urinalysis, Routine    Microalb/Creat Ratio, Random Urine    Basic Metabolic Panel (8)       Meds This Visit:  Requested Prescriptions      No prescriptions requested or ordered in this encounter     Imaging & Referrals:  None     2/26/2024  Reji Linares MD

## 2024-02-29 ENCOUNTER — PATIENT MESSAGE (OUTPATIENT)
Dept: NEPHROLOGY | Facility: CLINIC | Age: 44
End: 2024-02-29

## 2024-02-29 NOTE — TELEPHONE ENCOUNTER
From: Khushboo Milan  To: Reji Linares  Sent: 2/29/2024 1:39 PM CST  Subject: Test results    Hi Doctor, I will definitely repeat bloodwork and urine in 2 weeks. I did take excedrin for a headache the night before. And didn’t drink enough water. But I’m very about drinking water .

## 2024-02-29 NOTE — TELEPHONE ENCOUNTER
Urine test stable. Decline in kidney function noted. Keep  yourself hydrated. Are you taking any over the counter pain meds like advil, motrin etc - if so pls top     I have ordered repeat lab test - can you please get it done in 2 weeks as ordered - no need to fast   Written by Reji Linares MD on 2/29/2024 10:07 AM CST  Seen by patient Khushboo Milan on 2/29/2024  1:40 PM

## 2024-03-08 DIAGNOSIS — F98.8 ATTENTION DEFICIT DISORDER (ADD) WITHOUT HYPERACTIVITY: ICD-10-CM

## 2024-03-08 DIAGNOSIS — L20.82 FLEXURAL ECZEMA: ICD-10-CM

## 2024-03-09 RX ORDER — DEXTROAMPHETAMINE SACCHARATE, AMPHETAMINE ASPARTATE MONOHYDRATE, DEXTROAMPHETAMINE SULFATE AND AMPHETAMINE SULFATE 7.5; 7.5; 7.5; 7.5 MG/1; MG/1; MG/1; MG/1
30 CAPSULE, EXTENDED RELEASE ORAL DAILY
Qty: 30 CAPSULE | Refills: 0 | Status: SHIPPED | OUTPATIENT
Start: 2024-03-09 | End: 2024-04-08

## 2024-03-09 RX ORDER — TRIAMCINOLONE ACETONIDE 1 MG/G
1 OINTMENT TOPICAL 2 TIMES DAILY
Qty: 30 G | Refills: 0 | Status: SHIPPED | OUTPATIENT
Start: 2024-03-09

## 2024-03-11 ENCOUNTER — LAB ENCOUNTER (OUTPATIENT)
Dept: LAB | Facility: HOSPITAL | Age: 44
End: 2024-03-11
Attending: INTERNAL MEDICINE
Payer: COMMERCIAL

## 2024-03-11 DIAGNOSIS — N02.2 MEMBRANOUS NEPHROPATHY DETERMINED BY BIOPSY: ICD-10-CM

## 2024-03-11 LAB
CREAT BLD-MCNC: 0.93 MG/DL
EGFRCR SERPLBLD CKD-EPI 2021: 79 ML/MIN/1.73M2 (ref 60–?)

## 2024-03-11 PROCEDURE — 36415 COLL VENOUS BLD VENIPUNCTURE: CPT

## 2024-03-11 PROCEDURE — 82565 ASSAY OF CREATININE: CPT

## 2024-03-18 ENCOUNTER — OFFICE VISIT (OUTPATIENT)
Dept: DERMATOLOGY CLINIC | Facility: CLINIC | Age: 44
End: 2024-03-18

## 2024-03-18 DIAGNOSIS — E85.4 LICHEN AMYLOIDOSIS (HCC): Primary | ICD-10-CM

## 2024-03-18 DIAGNOSIS — L99 LICHEN AMYLOIDOSIS (HCC): Primary | ICD-10-CM

## 2024-03-18 PROCEDURE — 99214 OFFICE O/P EST MOD 30 MIN: CPT | Performed by: STUDENT IN AN ORGANIZED HEALTH CARE EDUCATION/TRAINING PROGRAM

## 2024-03-18 RX ORDER — AMMONIUM LACTATE 12 G/100G
LOTION TOPICAL
Qty: 225 G | Refills: 11 | Status: SHIPPED | OUTPATIENT
Start: 2024-03-18

## 2024-03-18 RX ORDER — CLOBETASOL PROPIONATE 0.5 MG/G
OINTMENT TOPICAL
Qty: 60 G | Refills: 3 | Status: SHIPPED | OUTPATIENT
Start: 2024-03-18

## 2024-03-18 NOTE — PROGRESS NOTES
New Patient    Referred by: No referring provider defined for this encounter.    CHIEF COMPLAINT: Lesion of concern     HISTORY OF PRESENT ILLNESS: Khushboo Milan is a 43 year old female here for evaluation of lesion of concern.    1. Growth   Location: ***  Duration: ***  Signs and symptoms: ***  Current treatment: ***  Past treatments: ***    2. Growth   Location: ***  Duration: ***  Signs and symptoms: ***  Current treatment: ***  Past treatments: ***    Personal Dermatologic History  History of skin cancer: {YES/NO:5531}  History of  atypical moles: {YES/NO:5531}    FAMILY HISTORY:  History of melanoma: {YES/NO:5531}    Past Medical History  Past Medical History:   Diagnosis Date    Asthma (HCC)     Attention deficit disorder     Extrinsic asthma, unspecified     High blood pressure     Kidney disease     Renal disorder     protein in urine    Unspecified essential hypertension     Verruca vulgaris 07/2020    Central Pubic Area       REVIEW OF SYSTEMS:  Constitutional: Denies fever, chills, unintentional weight loss.   Skin as per HPI    Medications  Current Outpatient Medications   Medication Sig Dispense Refill    amphetamine-dextroamphetamine ER 30 MG Oral Capsule SR 24 Hr Take 1 capsule (30 mg total) by mouth daily. 30 capsule 0    triamcinolone 0.1 % External Ointment Apply 1 Application  topically 2 (two) times daily. 30 g 0    Amphetamine-Dextroamphet ER (ADDERALL XR) 20 MG Oral Capsule SR 24 Hr Take 1 capsule (20 mg total) by mouth every morning. 30 capsule 0    ergocalciferol 1.25 MG (10223 UT) Oral Cap Take 1 capsule (50,000 Units total) by mouth once a week. 12 capsule 1    losartan 50 MG Oral Tab Take 1 tablet (50 mg total) by mouth daily. 90 tablet 2    Vitamin C 500 MG Oral Tab Take 1 tablet (500 mg total) by mouth daily.      Zinc 50 MG Oral Tab Take 1 tablet by mouth daily.      magnesium 250 MG Oral Tab Take 1 tablet (250 mg total) by mouth daily.      BIOTIN OR Take 1 tablet by mouth daily.       albuterol 108 (90 Base) MCG/ACT Inhalation Aero Soln INHALE TWO PUFFS INTO THE LUNGS EVERY 4 (FOUR) HOURS AS NEEDED FOR WHEEZING OR SHORTNESS OF BREATH 8.5 g 2    fluticasone propionate 50 MCG/ACT Nasal Suspension 2 sprays by Each Nare route daily. 3 each 2    cyanocobalamin 100 MCG Oral Tab Take 2.5 tablets (250 mcg total) by mouth daily.      Collagen 500 MG Oral Cap Take by mouth.         PHYSICAL EXAM:  General: awake, alert, no acute distress  Neuropsych: appropriate mood and affect  Eyes: Sclerae anicteric, without conjunctival injection, eyelids unremarkable  Skin: Skin exam was performed today including the following: ***. Pertinent findings include:   - ***    ASSESSMENT & PLAN:  Pathophysiology of diagnoses discussed with patient.  Therapeutic options reviewed. Risks, benefits, and alternatives discussed with patient. Instructions reviewed at length.      Return to clinic: *** or sooner if something concerning arises     Ian Esparza MD

## 2024-03-18 NOTE — PROGRESS NOTES
March 18, 2024    New patient     CHIEF COMPLAINT: Rash     HISTORY OF PRESENT ILLNESS: .    1. Rash   Location: B/L Lower legs   Duration: Years  Signs and symptoms: Itchy, raised, discoloration   Current treatment: None   Past treatments: Fluocinonide 0.05%      DERM HISTORY:  History of skin cancer: No  History of chronic skin disease/condition: No    FAMILY HISTORY:  History of melanoma: No  History of chronic skin disease/condition: No    History/Other:    REVIEW OF SYSTEMS:  Constitutional: Denies fever, chills, unintentional weight loss.   Skin as per HPI    PAST MEDICAL HISTORY:  Past Medical History:   Diagnosis Date    Asthma (HCC)     Attention deficit disorder     Extrinsic asthma, unspecified     High blood pressure     Kidney disease     Renal disorder     protein in urine    Unspecified essential hypertension     Verruca vulgaris 07/2020    Central Pubic Area       Medications  Current Outpatient Medications   Medication Sig Dispense Refill    amphetamine-dextroamphetamine ER 30 MG Oral Capsule SR 24 Hr Take 1 capsule (30 mg total) by mouth daily. 30 capsule 0    triamcinolone 0.1 % External Ointment Apply 1 Application  topically 2 (two) times daily. 30 g 0    Amphetamine-Dextroamphet ER (ADDERALL XR) 20 MG Oral Capsule SR 24 Hr Take 1 capsule (20 mg total) by mouth every morning. 30 capsule 0    ergocalciferol 1.25 MG (21341 UT) Oral Cap Take 1 capsule (50,000 Units total) by mouth once a week. 12 capsule 1    losartan 50 MG Oral Tab Take 1 tablet (50 mg total) by mouth daily. 90 tablet 2    Vitamin C 500 MG Oral Tab Take 1 tablet (500 mg total) by mouth daily.      Zinc 50 MG Oral Tab Take 1 tablet by mouth daily.      magnesium 250 MG Oral Tab Take 1 tablet (250 mg total) by mouth daily.      BIOTIN OR Take 1 tablet by mouth daily.      albuterol 108 (90 Base) MCG/ACT Inhalation Aero Soln INHALE TWO PUFFS INTO THE LUNGS EVERY 4 (FOUR) HOURS AS NEEDED FOR WHEEZING OR SHORTNESS OF BREATH 8.5 g 2     fluticasone propionate 50 MCG/ACT Nasal Suspension 2 sprays by Each Nare route daily. 3 each 2    cyanocobalamin 100 MCG Oral Tab Take 2.5 tablets (250 mcg total) by mouth daily.      Collagen 500 MG Oral Cap Take by mouth.         Objective:    PHYSICAL EXAM:  General: awake, alert, no acute distress  Skin: Skin exam was performed today including the following: legs. Pertinent findings include:   - with hyerpigmented papules and plaques    ASSESSMENT & PLAN:  Pathophysiology of diagnoses discussed with patient.  Therapeutic options reviewed. Risks, benefits, and alternatives discussed with patient. Instructions reviewed at length.    #Lichen amyloidosis   - Discussed relation to scratching   - clobetasol 0.05% twice daily to affected areas Monday-Friday. Take weekends off. Avoid use on face, breasts, groin, or axillae.  - Mix clobetasol with lac-hydrin and apply twice daily  Monday-Friday     Return to clinic: 3 months or sooner if something concerning arises     Ian Esparza MD

## 2024-04-03 DIAGNOSIS — J45.30 MILD PERSISTENT ASTHMA, UNSPECIFIED WHETHER COMPLICATED (HCC): ICD-10-CM

## 2024-04-03 DIAGNOSIS — F98.8 ATTENTION DEFICIT DISORDER (ADD) WITHOUT HYPERACTIVITY: ICD-10-CM

## 2024-04-03 DIAGNOSIS — I10 ESSENTIAL HYPERTENSION: ICD-10-CM

## 2024-04-03 DIAGNOSIS — J45.20 MILD INTERMITTENT ASTHMA WITHOUT COMPLICATION (HCC): Primary | ICD-10-CM

## 2024-04-03 NOTE — TELEPHONE ENCOUNTER
Pt would like a refill on Rx adderall, losartan, and albuterol. Please advise.     Verified pharmacy - [facility] in [place]       Current Outpatient Medications   Medication Sig Dispense Refill       3       11    amphetamine-dextroamphetamine ER 30 MG Oral Capsule SR 24 Hr Take 1 capsule (30 mg total) by mouth daily. 30 capsule 0       0       1    losartan 50 MG Oral Tab Take 1 tablet (50 mg total) by mouth daily. 90 tablet 2    albuterol 108 (90 Base) MCG/ACT Inhalation Aero Soln INHALE TWO PUFFS INTO THE LUNGS EVERY 4 (FOUR) HOURS AS NEEDED FOR WHEEZING OR SHORTNESS OF BREATH 8.5 g 2

## 2024-04-04 RX ORDER — LOSARTAN POTASSIUM 50 MG/1
50 TABLET ORAL DAILY
Qty: 90 TABLET | Refills: 2 | OUTPATIENT
Start: 2024-04-04

## 2024-04-05 NOTE — TELEPHONE ENCOUNTER
Please review. Protocol failed / No Protocol.    Requested Prescriptions   Pending Prescriptions Disp Refills    amphetamine-dextroamphetamine ER 30 MG Oral Capsule SR 24 Hr 30 capsule 0     Sig: Take 1 capsule (30 mg total) by mouth daily.       Controlled Substance Medication Failed - 4/3/2024  3:12 PM        Failed - This medication is a controlled substance - forward to provider to refill          losartan 50 MG Oral Tab 90 tablet 2     Sig: Take 1 tablet (50 mg total) by mouth daily.       Hypertension Medications Protocol Passed - 4/3/2024  3:12 PM        Passed - CMP or BMP in past 12 months        Passed - Last BP reading less than 140/90     BP Readings from Last 1 Encounters:   02/26/24 139/82               Passed - In person appointment or virtual visit in the past 12 mos or appointment in next 3 mos     Recent Outpatient Visits              2 weeks ago Lichen amyloidosis (HCC)    [facility], [address], [place] Ian Esparza MD    Office Visit    1 month ago Membranous nephropathy 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    [facility] Reji Shrestha MD    Office Visit    2 months ago [facility]    [facility]    7 months ago Membranous nephropathy 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    [facility] Reji Shrestha MD    Office Visit    8 months ago Mild persistent asthma, unspecified whether complicated (HCC)    [facility] Neymar Max,     Office Visit          Future Appointments         Provider Department Appt Notes    In 4 days [address] [facility]     In 3 weeks Neymar Max DO [facility] 3 month f/u - med refill    In 2 months Ian Esparza MD [facility], [address], [place] 3  MONTHS FOLLOW UP               Passed - EGFRCR or GFRNAA > 50     GFR Evaluation  EGFRCR: 79 , resulted on 3/11/2024            albuterol 108 (90 Base) MCG/ACT Inhalation Aero Soln 8.5 g 2     Sig: INHALE TWO PUFFS INTO THE LUNGS EVERY 4 (FOUR) HOURS AS NEEDED FOR WHEEZING OR SHORTNESS OF BREATH       Asthma & COPD Medication Protocol Failed - 4/3/2024  3:12 PM        Failed - AAP/ACT given in last 12 months     No data recorded  No data recorded  No data recorded  21          Passed - Asthma Action Score greater than or equal to 20        Passed - Appointment in past 6 or next 3 months      Recent Outpatient Visits              2 weeks ago Lichen amyloidosis (HCC)    [facility], [address], [place] Ian Esparza MD    Office Visit    1 month ago Membranous nephropathy 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    [facility] Reji Shrestha MD    Office Visit    2 months ago Essential hypertension    [facility] Neymar Max DO    Office Visit    7 months ago Membranous nephropathy 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    [facility] Reji Shrestha MD    Office Visit    8 months ago Mild persistent asthma, unspecified whether complicated (HCC)    [facility] Neymar Max DO    Office Visit          Future Appointments         Provider Department Appt Notes    In 4 days [address] - [facility]     In 3 weeks Neymar Max DO [facility] 3 month f/u - med refill    In 2 months Ian Esparza MD [facility], [address], [place] 3 MONTHS FOLLOW UP

## 2024-04-06 RX ORDER — ALBUTEROL SULFATE 90 UG/1
AEROSOL, METERED RESPIRATORY (INHALATION)
Qty: 8.5 G | Refills: 3 | Status: SHIPPED | OUTPATIENT
Start: 2024-04-06

## 2024-04-06 RX ORDER — DEXTROAMPHETAMINE SACCHARATE, AMPHETAMINE ASPARTATE MONOHYDRATE, DEXTROAMPHETAMINE SULFATE AND AMPHETAMINE SULFATE 7.5; 7.5; 7.5; 7.5 MG/1; MG/1; MG/1; MG/1
30 CAPSULE, EXTENDED RELEASE ORAL DAILY
Qty: 30 CAPSULE | Refills: 0 | Status: SHIPPED | OUTPATIENT
Start: 2024-04-06 | End: 2024-05-06

## 2024-04-08 ENCOUNTER — HOSPITAL ENCOUNTER (OUTPATIENT)
Dept: MAMMOGRAPHY | Facility: HOSPITAL | Age: 44
Discharge: HOME | End: 2024-04-08
Attending: NURSE PRACTITIONER
Payer: COMMERCIAL

## 2024-04-08 ENCOUNTER — HOSPITAL ENCOUNTER (OUTPATIENT)
Dept: ULTRASOUND IMAGING | Facility: HOSPITAL | Age: 44
Discharge: HOME | End: 2024-04-08
Attending: NURSE PRACTITIONER
Payer: COMMERCIAL

## 2024-04-08 DIAGNOSIS — N63.10 MASS OF RIGHT BREAST, UNSPECIFIED QUADRANT: ICD-10-CM

## 2024-04-08 DIAGNOSIS — R92.333 HETEROGENEOUSLY DENSE TISSUE OF BOTH BREASTS ON MAMMOGRAPHY: ICD-10-CM

## 2024-04-08 PROCEDURE — 77066 DX MAMMO INCL CAD BI: CPT | Performed by: NURSE PRACTITIONER

## 2024-04-08 PROCEDURE — 76642 ULTRASOUND BREAST LIMITED: CPT | Performed by: NURSE PRACTITIONER

## 2024-04-08 PROCEDURE — 77062 BREAST TOMOSYNTHESIS BI: CPT | Performed by: NURSE PRACTITIONER

## 2024-04-09 DIAGNOSIS — N63.41 SUBAREOLAR MASS OF RIGHT BREAST: Primary | ICD-10-CM

## 2024-04-29 ENCOUNTER — OFFICE VISIT (OUTPATIENT)
Dept: FAMILY MEDICINE CLINIC | Facility: CLINIC | Age: 44
End: 2024-04-29
Payer: COMMERCIAL

## 2024-04-29 VITALS
SYSTOLIC BLOOD PRESSURE: 142 MMHG | HEIGHT: 62 IN | TEMPERATURE: 97 F | WEIGHT: 108 LBS | BODY MASS INDEX: 19.88 KG/M2 | DIASTOLIC BLOOD PRESSURE: 93 MMHG | HEART RATE: 76 BPM

## 2024-04-29 DIAGNOSIS — R80.9 NON-NEPHROTIC RANGE PROTEINURIA: ICD-10-CM

## 2024-04-29 DIAGNOSIS — J30.89 OTHER ALLERGIC RHINITIS: ICD-10-CM

## 2024-04-29 DIAGNOSIS — J30.1 ALLERGIC RHINITIS DUE TO POLLEN, UNSPECIFIED SEASONALITY: ICD-10-CM

## 2024-04-29 DIAGNOSIS — I10 ESSENTIAL HYPERTENSION: ICD-10-CM

## 2024-04-29 DIAGNOSIS — J45.41 MODERATE PERSISTENT ASTHMA WITH EXACERBATION (HCC): ICD-10-CM

## 2024-04-29 DIAGNOSIS — F98.8 ATTENTION DEFICIT DISORDER (ADD) WITHOUT HYPERACTIVITY: ICD-10-CM

## 2024-04-29 DIAGNOSIS — E55.9 VITAMIN D DEFICIENCY: ICD-10-CM

## 2024-04-29 DIAGNOSIS — L20.82 FLEXURAL ECZEMA: ICD-10-CM

## 2024-04-29 DIAGNOSIS — Z00.00 ADULT GENERAL MEDICAL EXAM: Primary | ICD-10-CM

## 2024-04-29 RX ORDER — DEXTROAMPHETAMINE SACCHARATE, AMPHETAMINE ASPARTATE, DEXTROAMPHETAMINE SULFATE AND AMPHETAMINE SULFATE 7.5; 7.5; 7.5; 7.5 MG/1; MG/1; MG/1; MG/1
30 TABLET ORAL DAILY
Qty: 30 TABLET | Refills: 0 | Status: CANCELLED | OUTPATIENT
Start: 2024-04-29

## 2024-04-29 RX ORDER — ERGOCALCIFEROL 1.25 MG/1
50000 CAPSULE ORAL WEEKLY
Qty: 12 CAPSULE | Refills: 1 | Status: SHIPPED | OUTPATIENT
Start: 2024-04-29

## 2024-04-29 RX ORDER — ALBUTEROL SULFATE 90 UG/1
AEROSOL, METERED RESPIRATORY (INHALATION)
Qty: 8.5 G | Refills: 3 | Status: SHIPPED | OUTPATIENT
Start: 2024-04-29

## 2024-04-29 RX ORDER — FLUTICASONE PROPIONATE 50 MCG
2 SPRAY, SUSPENSION (ML) NASAL DAILY
Qty: 3 EACH | Refills: 2 | Status: SHIPPED | OUTPATIENT
Start: 2024-04-29

## 2024-04-29 RX ORDER — MONTELUKAST SODIUM 10 MG/1
10 TABLET ORAL NIGHTLY
Qty: 90 TABLET | Refills: 2 | Status: SHIPPED | OUTPATIENT
Start: 2024-04-29

## 2024-04-29 RX ORDER — MONTELUKAST SODIUM 10 MG/1
TABLET ORAL
COMMUNITY
End: 2024-04-29

## 2024-04-29 RX ORDER — TRIAMCINOLONE ACETONIDE 1 MG/G
1 OINTMENT TOPICAL 2 TIMES DAILY
Qty: 30 G | Refills: 0 | Status: SHIPPED | OUTPATIENT
Start: 2024-04-29

## 2024-04-29 RX ORDER — LOSARTAN POTASSIUM 100 MG/1
100 TABLET ORAL DAILY
Qty: 90 TABLET | Refills: 1 | Status: SHIPPED | OUTPATIENT
Start: 2024-04-29

## 2024-04-29 NOTE — PROGRESS NOTES
Patient ID: Khushboo Milan is a 43 year old female.    HPI  Chief Complaint   Patient presents with    Medication Follow-Up    Physical     She would like a physical but also discussed her asthma exacerbation and bad allergies.  She is not on a daily inhaler but currently she has been having postnasal drip and nasal congestion which aggravates her asthma.  Her asthma control test was 5. She is waking up coughing and using her nebulizer more often.  She takes Zyrtec over-the-counter but has not been taking the Flonase.  She has been sneezing, has been having stuffy nose and runny nose as well.    She sees the nephrologist.  She feels her blood pressure might be high because she has been taking some of the albuterol and the nebulizer as well due to the asthma exacerbation.  Although when I reviewed her blood pressure numbers they have been always a bit elevated.  She has been on the losartan 50 mg for a very long time and has no problem with increasing the dose.  She is taking her weekly vitamin D.  I did review the nephrology labs that were done recently.    Her ADD medication works for her and she has not had any issues with it.  No chest pain or shortness of breath or palpitations.    Health Maintenance   Topic Date Due    Asthma Action Plan  Never done    Asthma Control Test  07/18/2017    COVID-19 Vaccine (4 - 2023-24 season) 09/01/2023    Annual Physical  04/06/2024    Mammogram  04/08/2025    Pap Smear  04/06/2026    DTaP,Tdap,and Td Vaccines (4 - Td or Tdap) 11/05/2030    Influenza Vaccine  Completed    Annual Depression Screening  Completed    Pneumococcal Vaccine: Birth to 64yrs  Completed       =======================================================    Lab Results   Component Value Date    WBC 11.8 (H) 10/09/2023    RBC 4.62 10/09/2023    HGB 13.5 10/09/2023    HCT 43.0 10/09/2023    .0 10/09/2023    MPV 8.4 05/03/2018    MCV 93.1 10/09/2023    MCH 29.2 10/09/2023    MCHC 31.4 10/09/2023    RDW  13.3 10/09/2023    NEPRELIM 6.66 10/09/2023    NEUT 6.1 04/14/2015    LYMPH 4.6 (H) 04/14/2015    MON 1.1 (H) 04/14/2015    EOS 1.1 (H) 04/14/2015    BASO 0.1 04/14/2015    NEPERCENT 56.2 10/09/2023    LYPERCENT 26.3 10/09/2023    MOPERCENT 8.4 10/09/2023    EOPERCENT 7.9 10/09/2023    BAPERCENT 0.9 10/09/2023    NE 6.66 10/09/2023    LYMABS 3.11 10/09/2023    MOABSO 0.99 10/09/2023    EOABSO 0.93 (H) 10/09/2023    BAABSO 0.11 10/09/2023       Lab Results   Component Value Date    GLU 80 02/26/2024    BUN 14 02/26/2024    BUNCREA 12.6 02/26/2024    CREATSERUM 0.93 03/11/2024    ANIONGAP 6 02/26/2024    GFRNAA 98 09/13/2021    GFRAA 113 09/13/2021    CA 9.3 02/26/2024    OSMOCALC 291 02/26/2024    ALKPHO 64 10/09/2023    AST 14 (L) 10/09/2023    ALT 20 10/09/2023    ALKPHOS 61 03/08/2015    BILT 0.3 10/09/2023    TP 7.2 10/09/2023    ALB 3.9 10/09/2023    GLOBULIN 3.3 10/09/2023    AGRATIO 1.4 03/08/2015     02/26/2024    K 4.2 02/26/2024     02/26/2024    CO2 27.0 02/26/2024       Lab Results   Component Value Date    GLU 80 02/26/2024    BUN 14 02/26/2024    CREATSERUM 0.93 03/11/2024    BUNCREA 12.6 02/26/2024    ANIONGAP 6 02/26/2024    GFRAA 113 09/13/2021    GFRNAA 98 09/13/2021    CA 9.3 02/26/2024     02/26/2024    K 4.2 02/26/2024     02/26/2024    CO2 27.0 02/26/2024    OSMOCALC 291 02/26/2024       Lab Results   Component Value Date    COLORUR Yellow 02/26/2024    CLARITY Clear 02/26/2024    SPECGRAVITY 1.025 02/26/2024    GLUUR Normal 02/26/2024    BILUR Negative 02/26/2024    KETUR Negative 02/26/2024    BLOODURINE Negative 02/26/2024    PHURINE 5.5 02/26/2024    PROUR Trace (A) 02/26/2024    UROBILINOGEN Normal 02/26/2024    NITRITE Negative 02/26/2024    LEUUR Negative 02/26/2024    ASCACIDURINE Negative 03/08/2015    NMIC Microscopic not indicated 02/26/2024    WBCUR 1-5 04/10/2023    RBCUR 0-2 04/10/2023    EPIUR Few (A) 04/10/2023    BACUR None Seen 04/10/2023    HYLUR 1  01/25/2021    MICCOM Completed 03/08/2015     Lab Results   Component Value Date     04/10/2023    A1C 5.1 04/10/2023       Lab Results   Component Value Date    MALBP 4.80 02/26/2024    CREUR 182.60 02/26/2024    CREAURINE 236.1 08/09/2015    MIALBURINE 216.0 (H) 08/09/2015    MCRRATIOUR 914.9 (H) 08/09/2015       Lab Results   Component Value Date    CHOLEST 164 04/10/2023    TRIG 48 04/10/2023    HDL 87 (H) 04/10/2023    LDL 67 04/10/2023    VLDL 7 04/10/2023    NONHDLC 77 04/10/2023    CALCNONHDL 109 03/08/2015     TSH (mIU/mL)   Date Value   04/10/2023 1.480     TSH (S) (uIU/mL)   Date Value   03/08/2015 2.19       No results found for: \"B12\", \"VITB12\"    No results found for: \"IRON\", \"IRONTOT\"    No results found for: \"SAT\"    No results found for: \"IRON\", \"IRONTOT\", \"TIBC\", \"IRONSAT\", \"TRANSFERRIN\", \"TIBCP\", \"IRONBIND\", \"SAT\", \"SATUR\"    No results found for: \"PETER\"    Lab Results   Component Value Date    VITD 22.2 (L) 04/10/2023    WMPP72AH 42.5 03/08/2015     No results found for: \"PSA\", \"QPSA\", \"TOTPSASCREEN\"    =======================================================    Wt Readings from Last 6 Encounters:   04/29/24 108 lb (49 kg)   02/26/24 109 lb (49.4 kg)   01/08/24 107 lb (48.5 kg)   10/10/23 112 lb (50.8 kg)   08/21/23 112 lb (50.8 kg)   07/10/23 111 lb 9.6 oz (50.6 kg)               BMI Readings from Last 6 Encounters:   04/29/24 19.75 kg/m²   02/26/24 19.94 kg/m²   01/08/24 19.57 kg/m²   10/10/23 20.49 kg/m²   08/21/23 20.49 kg/m²   07/10/23 20.41 kg/m²       BP Readings from Last 6 Encounters:   04/29/24 149/87   02/26/24 139/82   01/08/24 136/87   10/13/23 145/85   08/21/23 127/87   07/10/23 122/90         Review of Systems  See HPI above    Past Medical History:    Asthma (HCC)    Attention deficit disorder    Extrinsic asthma, unspecified    High blood pressure    Kidney disease    Renal disorder    protein in urine    Unspecified essential hypertension    Verruca vulgaris    Central  Pubic Area       Past Surgical History:   Procedure Laterality Date    Other      KIDNEY BIOPSY    Shoulder arthroscopy Right 2018    Rotator cuff repair       Social History     Socioeconomic History    Marital status: Single     Spouse name: Not on file    Number of children: Not on file    Years of education: Not on file    Highest education level: Not on file   Occupational History    Not on file   Tobacco Use    Smoking status: Former     Current packs/day: 1.00     Average packs/day: 1 pack/day for 3.0 years (3.0 ttl pk-yrs)     Types: Cigarettes    Smokeless tobacco: Never   Vaping Use    Vaping status: Never Used   Substance and Sexual Activity    Alcohol use: Not Currently     Comment: occ    Drug use: No    Sexual activity: Not on file   Other Topics Concern     Service Not Asked    Blood Transfusions Not Asked    Caffeine Concern Yes     Comment: coffee,    Occupational Exposure Not Asked    Hobby Hazards Not Asked    Sleep Concern Not Asked    Stress Concern Not Asked    Weight Concern Not Asked    Special Diet Not Asked    Back Care Not Asked    Exercise Not Asked    Bike Helmet Not Asked    Seat Belt Not Asked    Self-Exams Not Asked    Grew up on a farm Not Asked    History of tanning No    Outdoor occupation Not Asked    Breast feeding No    Reaction to local anesthetic No    Pt has a pacemaker No    Pt has a defibrillator No   Social History Narrative    Not on file     Social Determinants of Health     Financial Resource Strain: Not on file   Food Insecurity: Not on file   Transportation Needs: Not on file   Physical Activity: Not on file   Stress: Not on file   Social Connections: Not on file   Housing Stability: Not on file          Current Outpatient Medications   Medication Sig Dispense Refill    montelukast 10 MG Oral Tab Montelukast Oral        active      amphetamine-dextroamphetamine (ADDERALL) 30 MG Oral Tab Take 1 tablet (30 mg total) by mouth daily. 30 tablet 0    albuterol 108  (90 Base) MCG/ACT Inhalation Aero Soln INHALE TWO PUFFS INTO THE LUNGS EVERY 4 (FOUR) HOURS AS NEEDED FOR WHEEZING OR SHORTNESS OF BREATH 8.5 g 3    clobetasol 0.05 % External Ointment Apply to the affected areas twice daily Monday-Friday. Take weekends off. Advised to AVOID on face, under breasts, underarms and groin. 60 g 3    ammonium lactate 12 % External Lotion Mix with clobetasol and apply twice daily  Monday-Friday to affected areas on legs 225 g 11    triamcinolone 0.1 % External Ointment Apply 1 Application  topically 2 (two) times daily. 30 g 0    ergocalciferol 1.25 MG (87138 UT) Oral Cap Take 1 capsule (50,000 Units total) by mouth once a week. 12 capsule 1    losartan 50 MG Oral Tab Take 1 tablet (50 mg total) by mouth daily. 90 tablet 2    Vitamin C 500 MG Oral Tab Take 1 tablet (500 mg total) by mouth daily.      Zinc 50 MG Oral Tab Take 1 tablet by mouth daily.      magnesium 250 MG Oral Tab Take 1 tablet (250 mg total) by mouth daily.      BIOTIN OR Take 1 tablet by mouth daily.      fluticasone propionate 50 MCG/ACT Nasal Suspension 2 sprays by Each Nare route daily. 3 each 2    cyanocobalamin 100 MCG Oral Tab Take 2.5 tablets (250 mcg total) by mouth daily.      Collagen 500 MG Oral Cap Take by mouth.       Allergies:No Known Allergies   PHYSICAL EXAM:   Physical Exam  Physical Exam   Constitutional: . She appears well-developed and well-nourished. No distress.   HENT:   Head: Normocephalic.   Nasal tissue with slight swelling but nothing significant.  She does have paleness of the nasal tissue though.  Right Ear: Tympanic membrane and ear canal normal.   Left Ear: Tympanic membrane and ear canal normal.   Mouth/Throat: Oropharynx is clear and moist and mucous membranes are normal.   Eyes: Conjunctivae and EOM are normal. Pupils are equal, round, and reactive to light.   Neck: Normal range of motion. Neck supple. No thyromegaly present.   Cardiovascular: Normal rate, regular rhythm and no murmur  heard.  Pulmonary/Chest: Effort normal and breath sounds normal. No respiratory distress.  She is not winded when she speaks.  There is no wheezing or coughing at this time.  She does admit to taking her albuterol inhaler as well as doing the nebulizer this morning.  Abdominal: Soft. Bowel sounds are normal. There is no hepatosplenomegaly. There is no tenderness.   Lymphadenopathy:     She has no  cervical adenopathy.   Neurological: She is alert and oriented to person, place, and time . She has normal reflexes. No cranial nerve deficit.   Skin: Skin is warm and dry. No rash noted.   Psychiatric: She has a normal mood and affect   Vitals reviewed.    Blood pressure 149/87, pulse 76, temperature 97.4 °F (36.3 °C), height 5' 2\" (1.575 m), weight 108 lb (49 kg), last menstrual period 03/25/2024, not currently breastfeeding.  Vitals:    04/29/24 1146 04/29/24 1156   BP: 149/87 (!) 142/93   Pulse: 76    Temp: 97.4 °F (36.3 °C)    Weight: 108 lb (49 kg)    Height: 5' 2\" (1.575 m)             ASSESSMENT/PLAN:     Diagnoses and all orders for this visit:    Adult general medical exam  -     CBC With Differential With Platelet; Future  -     Comp Metabolic Panel (14); Future  -     Lipid Panel; Future  -     Assay, Thyroid Stim Hormone; Future    Essential hypertension  -     losartan 100 MG Oral Tab; Take 1 tablet (100 mg total) by mouth daily.  Increase losartan to 100 mg.  Attention deficit disorder (ADD) without hyperactivity  She still has enough of her medication and will let me know when she needs a refill.  Vitamin D deficiency  -     ergocalciferol 1.25 MG (07073 UT) Oral Cap; Take 1 capsule (50,000 Units total) by mouth once a week.  -     Vitamin D [E]; Future    Flexural eczema  -     triamcinolone 0.1 % External Ointment; Apply 1 Application  topically 2 (two) times daily.  Uses periodically for exacerbation of eczema  Non-nephrotic range proteinuria  This was much better per urinalysis from nephrology  Allergic  rhinitis due to pollen, unspecified seasonality  -     montelukast 10 MG Oral Tab; Take 1 tablet (10 mg total) by mouth nightly. Helps with allergies and asthma.  Continue the Zyrtec and Flonase in the morning and then the montelukast at bedtime.  If in 2 weeks you feel like her asthma is still not better we are going to add a daily inhaler with steroid.  Other allergic rhinitis  -     fluticasone propionate 50 MCG/ACT Nasal Suspension; 2 sprays by Each Nare route daily.  -     montelukast 10 MG Oral Tab; Take 1 tablet (10 mg total) by mouth nightly. Helps with allergies and asthma.    Moderate persistent asthma with exacerbation (HCC)  -     albuterol 108 (90 Base) MCG/ACT Inhalation Aero Soln; INHALE TWO PUFFS INTO THE LUNGS EVERY 4 (FOUR) HOURS AS NEEDED FOR WHEEZING OR SHORTNESS OF BREATH  -     montelukast 10 MG Oral Tab; Take 1 tablet (10 mg total) by mouth nightly. Helps with allergies and asthma.  As above      Referrals (if applicable)  No orders of the defined types were placed in this encounter.        Follow up if symptoms persist.  Take medicine (if given) as prescribed.  Approach to treatment discussed and patient/family member understands and agrees to plan.     Will decide on follow-up after I get her labs.      Neymar Max DO  4/29/2024

## 2024-05-09 DIAGNOSIS — F98.8 ATTENTION DEFICIT DISORDER (ADD) WITHOUT HYPERACTIVITY: ICD-10-CM

## 2024-05-10 NOTE — TELEPHONE ENCOUNTER
Please review. Protocol failed/No protocol  Last office visit 04/29/24  Last refill 04/11/24      Requested Prescriptions   Pending Prescriptions Disp Refills    AMPHETAMINE-DEXTROAMPHETAMINE 30 MG Oral Tab [Pharmacy Med Name: AMPHETAMINE-DEXTROAMPHETAMINE 30 MG ORAL TABLET] 30 tablet 0     Sig: Take 1 tablet (30 mg total) by mouth daily.       Controlled Substance Medication Failed - 5/9/2024 12:37 PM        Failed - This medication is a controlled substance - forward to provider to refill             Recent Outpatient Visits              1 week ago Adult general medical exam    [facility] Neymar Max,     Office Visit    1 month ago Lichen amyloidosis (HCC)    [facility], [address], [place] Ian Esparza MD    Office Visit    2 months ago Membranous nephropathy 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    [facility], Reji Crews MD    Office Visit    4 months ago Essential hypertension    [facility] Neymar Max,     Office Visit    8 months ago Membranous nephropathy 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

## 2024-05-13 NOTE — TELEPHONE ENCOUNTER
Patient called checking the status on this refill request. Per patient she is completely out of her medication. She is asking that this is completed ASAP.

## 2024-05-13 NOTE — TELEPHONE ENCOUNTER
Dr Max --- requested Thursday 5/9/24, OUT of medication.     Please advise.     Patient called office. Date of birth and full name both confirmed.   To check status. Advised it has not yet been signed.

## 2024-05-15 RX ORDER — DEXTROAMPHETAMINE SACCHARATE, AMPHETAMINE ASPARTATE, DEXTROAMPHETAMINE SULFATE AND AMPHETAMINE SULFATE 7.5; 7.5; 7.5; 7.5 MG/1; MG/1; MG/1; MG/1
30 TABLET ORAL DAILY
Qty: 30 TABLET | Refills: 0 | OUTPATIENT
Start: 2024-05-15

## 2024-05-15 RX ORDER — DEXTROAMPHETAMINE SACCHARATE, AMPHETAMINE ASPARTATE, DEXTROAMPHETAMINE SULFATE AND AMPHETAMINE SULFATE 7.5; 7.5; 7.5; 7.5 MG/1; MG/1; MG/1; MG/1
30 TABLET ORAL DAILY
Qty: 30 TABLET | Refills: 0 | Status: SHIPPED | OUTPATIENT
Start: 2024-05-15 | End: 2024-06-14

## 2024-05-15 RX ORDER — DEXTROAMPHETAMINE SACCHARATE, AMPHETAMINE ASPARTATE, DEXTROAMPHETAMINE SULFATE AND AMPHETAMINE SULFATE 7.5; 7.5; 7.5; 7.5 MG/1; MG/1; MG/1; MG/1
30 TABLET ORAL DAILY
Qty: 30 TABLET | Refills: 0 | Status: SHIPPED | OUTPATIENT
Start: 2024-07-16 | End: 2024-08-15

## 2024-05-15 RX ORDER — DEXTROAMPHETAMINE SACCHARATE, AMPHETAMINE ASPARTATE, DEXTROAMPHETAMINE SULFATE AND AMPHETAMINE SULFATE 7.5; 7.5; 7.5; 7.5 MG/1; MG/1; MG/1; MG/1
30 TABLET ORAL DAILY
Qty: 30 TABLET | Refills: 0 | Status: SHIPPED | OUTPATIENT
Start: 2024-06-15 | End: 2024-07-15

## 2024-05-15 NOTE — TELEPHONE ENCOUNTER
Patient calling for an update on refill that she requested on 5/9/24    Patient would like this filled today if possible please - Patient asking why there is such a delay in refill.     Routing to podmate Dr. Winter Browning not in clinic today

## 2024-06-10 PROCEDURE — 87624 HPV HI-RISK TYP POOLED RSLT: CPT | Performed by: CLINICAL MEDICAL LABORATORY

## 2024-06-10 PROCEDURE — 88175 CYTOPATH C/V AUTO FLUID REDO: CPT | Performed by: CLINICAL MEDICAL LABORATORY

## 2024-06-11 ENCOUNTER — LAB REQUISITION (OUTPATIENT)
Dept: LAB | Age: 44
End: 2024-06-11

## 2024-06-11 DIAGNOSIS — B34.8 OTHER VIRAL INFECTIONS OF UNSPECIFIED SITE: ICD-10-CM

## 2024-06-11 DIAGNOSIS — N90.0 MILD VULVAR DYSPLASIA: ICD-10-CM

## 2024-06-11 DIAGNOSIS — N87.9 DYSPLASIA OF CERVIX UTERI, UNSPECIFIED: ICD-10-CM

## 2024-06-17 LAB
CASE RPRT: NORMAL
CLINICAL INFO: NORMAL
CYTOLOGY CVX/VAG STUDY: NORMAL
HPV16+18+45 E6+E7MRNA CVX NAA+PROBE: NEGATIVE
Lab: NORMAL
PAP EDUCATIONAL NOTE: NORMAL
SPECIMEN ADEQUACY: NORMAL

## 2024-08-16 DIAGNOSIS — F98.8 ATTENTION DEFICIT DISORDER (ADD) WITHOUT HYPERACTIVITY: ICD-10-CM

## 2024-08-16 DIAGNOSIS — L20.82 FLEXURAL ECZEMA: ICD-10-CM

## 2024-08-16 NOTE — TELEPHONE ENCOUNTER
Please review. Protocol Failed; No Protocol      Recent fills: 5/15/2024, 6/15/2024, 7/16/2024  Last Rx written: 5/15/2024  Last office visit: 4/29/2024    Future Appointments  Date Type Provider Dept   08/26/24 Appointment Neymar Max DO [facility]   Showing future appointments within next 150 days with a meds authorizing provider and meeting all other requirements        Requested Prescriptions   Pending Prescriptions Disp Refills    triamcinolone 0.1 % External Ointment 30 g 0     Sig: Apply 1 Application  topically 2 (two) times daily.       There is no refill protocol information for this order       amphetamine-dextroamphetamine (ADDERALL) 30 MG Oral Tab 30 tablet 0     Sig: Take 1 tablet (30 mg total) by mouth daily.       Controlled Substance Medication Failed - 8/16/2024  1:12 PM        Failed - This medication is a controlled substance - forward to provider to refill               Future Appointments         Provider Department Appt Notes    In 1 week Neymar Max DO [facility] Refill medicine          Recent Outpatient Visits              3 [facility]    Office Visit    5 months ago Lichen amyloidosis (HCC)    [facility], [place] Michalik, Daniel, MD    Office Visit    5 months ago Membranous nephropathy 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

## 2024-08-16 NOTE — TELEPHONE ENCOUNTER
Per patient, she has an appointment on 08/26/2024 but she is out of prescription medication, Losartan, Adderall, Vit D, Albuterol, triamcinolone ,and montelukast please send to her pharmacy on file verified.    Current Outpatient Medications   Medication Sig Dispense Refill    losartan 100 MG Oral Tab Take 1 tablet (100 mg total) by mouth daily. 90 tablet 1    albuterol 108 (90 Base) MCG/ACT Inhalation Aero Soln INHALE TWO PUFFS INTO THE LUNGS EVERY 4 (FOUR) HOURS AS NEEDED FOR WHEEZING OR SHORTNESS OF BREATH 8.5 g 3    ergocalciferol 1.25 MG (04683 UT) Oral Cap Take 1 capsule (50,000 Units total) by mouth once a week. 12 capsule 1    triamcinolone 0.1 % External Ointment Apply 1 Application  topically 2 (two) times daily. 30 g 0           montelukast 10 MG Oral Tab Take 1 tablet (10 mg total) by mouth nightly. Helps with allergies and asthma. 90 tablet 2    amphetamine-dextroamphetamine (ADDERALL) 30 MG Oral Tab Take 1 tablet (30 mg total) by mouth daily. 30 tablet 0

## 2024-08-16 NOTE — TELEPHONE ENCOUNTER
Losartan 100mg, Vitamin D: 6 month supply sent to [facility] on 2024    Albuterol inhaler: 4 inhalers sent to [facility] on 2024-patient has only received 2 inhalers.     Montelukast 10m month supply sent to [facility] on 2024

## 2024-08-18 RX ORDER — DEXTROAMPHETAMINE SACCHARATE, AMPHETAMINE ASPARTATE, DEXTROAMPHETAMINE SULFATE AND AMPHETAMINE SULFATE 7.5; 7.5; 7.5; 7.5 MG/1; MG/1; MG/1; MG/1
30 TABLET ORAL DAILY
Qty: 30 TABLET | Refills: 0 | Status: SHIPPED | OUTPATIENT
Start: 2024-08-18

## 2024-08-18 RX ORDER — TRIAMCINOLONE ACETONIDE 1 MG/G
1 OINTMENT TOPICAL 2 TIMES DAILY
Qty: 30 G | Refills: 0 | Status: SHIPPED | OUTPATIENT
Start: 2024-08-18 | End: 2024-08-26

## 2024-08-19 NOTE — TELEPHONE ENCOUNTER
Refilled times 1 but needs appointment before the next prescription will be filled.  Please call patient and set up an office visit as overdue.

## 2024-08-23 NOTE — TELEPHONE ENCOUNTER
Patient called and inquiring about medications. Informed medications are at [facility]. Call to fill prescriptions sent 4/29/24.

## 2024-08-26 ENCOUNTER — OFFICE VISIT (OUTPATIENT)
Dept: FAMILY MEDICINE CLINIC | Facility: CLINIC | Age: 44
End: 2024-08-26

## 2024-08-26 VITALS
SYSTOLIC BLOOD PRESSURE: 145 MMHG | HEART RATE: 92 BPM | TEMPERATURE: 97 F | HEIGHT: 62 IN | BODY MASS INDEX: 18.33 KG/M2 | WEIGHT: 99.63 LBS | DIASTOLIC BLOOD PRESSURE: 103 MMHG

## 2024-08-26 DIAGNOSIS — F98.8 ATTENTION DEFICIT DISORDER (ADD) WITHOUT HYPERACTIVITY: ICD-10-CM

## 2024-08-26 DIAGNOSIS — E55.9 VITAMIN D DEFICIENCY: ICD-10-CM

## 2024-08-26 DIAGNOSIS — L20.82 FLEXURAL ECZEMA: ICD-10-CM

## 2024-08-26 DIAGNOSIS — R63.4 WEIGHT LOSS, NON-INTENTIONAL: ICD-10-CM

## 2024-08-26 DIAGNOSIS — I10 ESSENTIAL HYPERTENSION: Primary | ICD-10-CM

## 2024-08-26 DIAGNOSIS — J30.89 OTHER ALLERGIC RHINITIS: ICD-10-CM

## 2024-08-26 DIAGNOSIS — J45.20 MILD INTERMITTENT ASTHMA WITHOUT COMPLICATION (HCC): ICD-10-CM

## 2024-08-26 DIAGNOSIS — N05.5 MEMBRANOPROLIFERATIVE NEPHROPATHY: ICD-10-CM

## 2024-08-26 PROCEDURE — 3077F SYST BP >= 140 MM HG: CPT | Performed by: FAMILY MEDICINE

## 2024-08-26 PROCEDURE — 3008F BODY MASS INDEX DOCD: CPT | Performed by: FAMILY MEDICINE

## 2024-08-26 PROCEDURE — 3080F DIAST BP >= 90 MM HG: CPT | Performed by: FAMILY MEDICINE

## 2024-08-26 PROCEDURE — 99214 OFFICE O/P EST MOD 30 MIN: CPT | Performed by: FAMILY MEDICINE

## 2024-08-26 PROCEDURE — G2211 COMPLEX E/M VISIT ADD ON: HCPCS | Performed by: FAMILY MEDICINE

## 2024-08-26 RX ORDER — ERGOCALCIFEROL 1.25 MG/1
50000 CAPSULE, LIQUID FILLED ORAL WEEKLY
Qty: 12 CAPSULE | Refills: 1 | Status: SHIPPED | OUTPATIENT
Start: 2024-08-26

## 2024-08-26 RX ORDER — DEXTROAMPHETAMINE SACCHARATE, AMPHETAMINE ASPARTATE, DEXTROAMPHETAMINE SULFATE AND AMPHETAMINE SULFATE 7.5; 7.5; 7.5; 7.5 MG/1; MG/1; MG/1; MG/1
30 TABLET ORAL DAILY
Qty: 30 TABLET | Refills: 0 | Status: SHIPPED | OUTPATIENT
Start: 2024-09-26 | End: 2024-10-26

## 2024-08-26 RX ORDER — TRIAMCINOLONE ACETONIDE 1 MG/G
1 OINTMENT TOPICAL 2 TIMES DAILY
Qty: 30 G | Refills: 0 | Status: SHIPPED | OUTPATIENT
Start: 2024-08-26

## 2024-08-26 RX ORDER — FLUTICASONE PROPIONATE 50 MCG
2 SPRAY, SUSPENSION (ML) NASAL DAILY
Qty: 3 EACH | Refills: 2 | Status: SHIPPED | OUTPATIENT
Start: 2024-08-26

## 2024-08-26 RX ORDER — DEXTROAMPHETAMINE SACCHARATE, AMPHETAMINE ASPARTATE, DEXTROAMPHETAMINE SULFATE AND AMPHETAMINE SULFATE 7.5; 7.5; 7.5; 7.5 MG/1; MG/1; MG/1; MG/1
30 TABLET ORAL DAILY
Qty: 30 TABLET | Refills: 0 | Status: SHIPPED | OUTPATIENT
Start: 2024-08-26 | End: 2024-09-25

## 2024-08-26 RX ORDER — DEXTROAMPHETAMINE SACCHARATE, AMPHETAMINE ASPARTATE, DEXTROAMPHETAMINE SULFATE AND AMPHETAMINE SULFATE 7.5; 7.5; 7.5; 7.5 MG/1; MG/1; MG/1; MG/1
30 TABLET ORAL DAILY
Qty: 30 TABLET | Refills: 0 | Status: SHIPPED | OUTPATIENT
Start: 2024-10-27 | End: 2024-11-26

## 2024-08-26 RX ORDER — DEXTROAMPHETAMINE SACCHARATE, AMPHETAMINE ASPARTATE, DEXTROAMPHETAMINE SULFATE AND AMPHETAMINE SULFATE 7.5; 7.5; 7.5; 7.5 MG/1; MG/1; MG/1; MG/1
30 TABLET ORAL DAILY
Qty: 30 TABLET | Refills: 2 | Status: CANCELLED | OUTPATIENT
Start: 2024-08-26

## 2024-08-26 RX ORDER — ALBUTEROL SULFATE 90 UG/1
AEROSOL, METERED RESPIRATORY (INHALATION)
Qty: 8.5 G | Refills: 3 | Status: SHIPPED | OUTPATIENT
Start: 2024-08-26

## 2024-08-26 RX ORDER — LOSARTAN POTASSIUM 100 MG/1
100 TABLET ORAL DAILY
Qty: 90 TABLET | Refills: 1 | Status: SHIPPED | OUTPATIENT
Start: 2024-08-26

## 2024-08-26 NOTE — PROGRESS NOTES
Patient ID: Khushboo Milan is a 43 year old female.    HPI  Chief Complaint   Patient presents with    Medication Follow-Up     Patient is on Adderall 30 mg once daily for her ADD.  She tolerates it well and has no chest pain, shortness of breath or palpitations.  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  In April of this year her blood pressure remained high and I increased her losartan to 100 mg from 50 mg daily.  She still needs to complete labs ordered from April.    She has been off the losartan for at least 10 days after the pharmacy that she used to go to close down and moved.  She just found out about this recently.  She needs all her medications moved to a [facility].  She states she has been quite stressed and appetite has been a bit down and she is not trying to lose weight.  She states just life in general has been stressful as she broke up with someone she was with for quite some time about 2 months ago and they moved out of the house they are living in and undergoing their separate ways.    She states her job is stable and she is now 3 minutes away from her job.        Wt Readings from Last 6 Encounters:   08/26/24 99 lb 9.6 oz (45.2 kg)   04/29/24 108 lb (49 kg)   02/26/24 109 lb (49.4 kg)   01/08/24 107 lb (48.5 kg)   10/10/23 112 lb (50.8 kg)   08/21/23 112 lb (50.8 kg)       BMI Readings from Last 6 Encounters:   08/26/24 18.22 kg/m²   04/29/24 19.75 kg/m²   02/26/24 19.94 kg/m²   01/08/24 19.57 kg/m²   10/10/23 20.49 kg/m²   08/21/23 20.49 kg/m²       BP Readings from Last 6 Encounters:   08/26/24 (!) 171/108   04/29/24 (!) 142/93   02/26/24 139/82   01/08/24 136/87   10/13/23 145/85   08/21/23 127/87         Review of Systems  See HPI      Medical History:      Past Medical History:    Asthma (HCC)    Attention deficit disorder    Extrinsic asthma, unspecified    High blood pressure    Kidney disease    Renal disorder    protein in urine    Unspecified essential  hypertension    Verruca vulgaris    Central Pubic Area       Past Surgical History:   Procedure Laterality Date    Other      KIDNEY BIOPSY    Shoulder arthroscopy Right 2018    Rotator cuff repair          Current Outpatient Medications   Medication Sig Dispense Refill    triamcinolone 0.1 % External Ointment Apply 1 Application  topically 2 (two) times daily. 30 g 0    amphetamine-dextroamphetamine (ADDERALL) 30 MG Oral Tab Take 1 tablet (30 mg total) by mouth daily. 30 tablet 0    losartan 100 MG Oral Tab Take 1 tablet (100 mg total) by mouth daily. 90 tablet 1    albuterol 108 (90 Base) MCG/ACT Inhalation Aero Soln INHALE TWO PUFFS INTO THE LUNGS EVERY 4 (FOUR) HOURS AS NEEDED FOR WHEEZING OR SHORTNESS OF BREATH 8.5 g 3    ergocalciferol 1.25 MG (96656 UT) Oral Cap Take 1 capsule (50,000 Units total) by mouth once a week. 12 capsule 1    fluticasone propionate 50 MCG/ACT Nasal Suspension 2 sprays by Each Nare route daily. 3 each 2    montelukast 10 MG Oral Tab Take 1 tablet (10 mg total) by mouth nightly. Helps with allergies and asthma. 90 tablet 2    clobetasol 0.05 % External Ointment Apply to the affected areas twice daily Monday-Friday. Take weekends off. Advised to AVOID on face, under breasts, underarms and groin. 60 g 3    ammonium lactate 12 % External Lotion Mix with clobetasol and apply twice daily  Monday-Friday to affected areas on legs 225 g 11    Vitamin C 500 MG Oral Tab Take 1 tablet (500 mg total) by mouth daily.      Zinc 50 MG Oral Tab Take 1 tablet by mouth daily.      magnesium 250 MG Oral Tab Take 1 tablet (250 mg total) by mouth daily.      BIOTIN OR Take 1 tablet by mouth daily.      cyanocobalamin 100 MCG Oral Tab Take 2.5 tablets (250 mcg total) by mouth daily.      Collagen 500 MG Oral Cap Take by mouth.       Allergies:No Known Allergies     Physical Exam:       Physical Exam  Blood pressure (!) 171/108, pulse 92, temperature 97.3 °F (36.3 °C), temperature source Temporal, height 5'  2\" (1.575 m), weight 99 lb 9.6 oz (45.2 kg), last menstrual period 03/25/2024, not currently breastfeeding.  Vitals:    08/26/24 1038 08/26/24 1050   BP: (!) 171/108 (!) 145/103   Pulse: 92    Temp: 97.3 °F (36.3 °C)    TempSrc: Temporal    Weight: 99 lb 9.6 oz (45.2 kg)    Height: 5' 2\" (1.575 m)      Physical Exam   Constitutional: Patient is oriented to person, place, and time. Patient appears well-developed and well-nourished. No distress.   HENT:   Head: Normocephalic and atraumatic.   Neck: Normal range of motion. Neck supple. No thyromegaly present.   Lymphadenopathy:     Has  no cervical adenopathy.   Cardiovascular: Normal rate, regular rhythm and no murmur heard.  Pulmonary/Chest: Effort normal and breath sounds normal. No respiratory distress.  Speaking clearly.  Not winded when she speaks.  Neurological: Patient is alert and oriented to person, place, and time.   Skin: Skin is warm and dry.   Psychiatric: Patient has a normal mood and affect.  No agitation or anxiety or depression.  Lower legs: No edema of the legs bilaterally.    Nursing note and vitals reviewed.           Assessment/Plan:      Diagnoses and all orders for this visit:    Essential hypertension  -     losartan 100 MG Oral Tab; Take 1 tablet (100 mg total) by mouth daily.  Patient Instructions   Get back on all your medications from the new pharmacy.  After about 3 to 4 weeks.take 7 to 8 days worth of home blood pressures.  Once you get that done go ahead and come in for a fasting labs which are already in the system.  Make sure to not eat for 10 hours but you can of course have your pills with water.    Attention deficit disorder (ADD) without hyperactivity  -     amphetamine-dextroamphetamine (ADDERALL) 30 MG Oral Tab; Take 1 tablet (30 mg total) by mouth daily.  -     amphetamine-dextroamphetamine (ADDERALL) 30 MG Oral Tab; Take 1 tablet (30 mg total) by mouth daily.  -     amphetamine-dextroamphetamine (ADDERALL) 30 MG Oral Tab; Take  1 tablet (30 mg total) by mouth daily.  Doing well on medication and will continue  Flexural eczema  -     triamcinolone 0.1 % External Ointment; Apply 1 Application  topically 2 (two) times daily.  Nicely controlled and uses this for flareups  Vitamin D deficiency  -     ergocalciferol 1.25 MG (55485 UT) Oral Cap; Take 1 capsule (50,000 Units total) by mouth once a week.    Other allergic rhinitis  -     fluticasone propionate 50 MCG/ACT Nasal Suspension; 2 sprays by Each Nare route daily.  Controlled with the Flonase  Membranoproliferative nephropathy  Up-to-date with seeing nephrology  Mild intermittent asthma without complication (HCC)  -     albuterol 108 (90 Base) MCG/ACT Inhalation Aero Soln; INHALE TWO PUFFS INTO THE LUNGS EVERY 4 (FOUR) HOURS AS NEEDED FOR WHEEZING OR SHORTNESS OF BREATH  Albuterol only if needed  Weight loss, non-intentional  Try adding more protein to your diet and increase portions.  I did go over her body mass index and weight loss.      Referrals (if applicable)  No orders of the defined types were placed in this encounter.        Follow up if symptoms persist.  Take medicine (if given) as prescribed.  Approach to treatment discussed and patient/family member understands and agrees to plan.     No follow-ups on file.        Neymar Max DO  8/26/2024

## 2024-08-26 NOTE — PATIENT INSTRUCTIONS
Get back on all your medications from the new pharmacy.  After about 3 to 4 weeks.take 7 to 8 days worth of home blood pressures.  Once you get that done go ahead and come in for a fasting labs which are already in the system.  Make sure to not eat for 10 hours but you can of course have your pills with water.

## 2024-09-15 ENCOUNTER — LAB ENCOUNTER (OUTPATIENT)
Dept: LAB | Facility: HOSPITAL | Age: 44
End: 2024-09-15
Attending: FAMILY MEDICINE
Payer: COMMERCIAL

## 2024-09-15 DIAGNOSIS — Z00.00 ADULT GENERAL MEDICAL EXAM: ICD-10-CM

## 2024-09-15 DIAGNOSIS — E55.9 VITAMIN D DEFICIENCY: ICD-10-CM

## 2024-09-15 LAB
ALBUMIN SERPL-MCNC: 3.9 G/DL (ref 3.2–4.8)
ALBUMIN/GLOB SERPL: 1.8 {RATIO} (ref 1–2)
ALP LIVER SERPL-CCNC: 63 U/L
ALT SERPL-CCNC: 12 U/L
ANION GAP SERPL CALC-SCNC: 5 MMOL/L (ref 0–18)
AST SERPL-CCNC: 14 U/L (ref ?–34)
BASOPHILS # BLD AUTO: 0.09 X10(3) UL (ref 0–0.2)
BASOPHILS NFR BLD AUTO: 1.2 %
BILIRUB SERPL-MCNC: 0.4 MG/DL (ref 0.3–1.2)
BUN BLD-MCNC: 11 MG/DL (ref 9–23)
BUN/CREAT SERPL: 13.6 (ref 10–20)
CALCIUM BLD-MCNC: 8.7 MG/DL (ref 8.7–10.4)
CHLORIDE SERPL-SCNC: 109 MMOL/L (ref 98–112)
CHOLEST SERPL-MCNC: 182 MG/DL (ref ?–200)
CO2 SERPL-SCNC: 26 MMOL/L (ref 21–32)
CREAT BLD-MCNC: 0.81 MG/DL
DEPRECATED RDW RBC AUTO: 46.3 FL (ref 35.1–46.3)
EGFRCR SERPLBLD CKD-EPI 2021: 93 ML/MIN/1.73M2 (ref 60–?)
EOSINOPHIL # BLD AUTO: 0.69 X10(3) UL (ref 0–0.7)
EOSINOPHIL NFR BLD AUTO: 9.2 %
ERYTHROCYTE [DISTWIDTH] IN BLOOD BY AUTOMATED COUNT: 13.5 % (ref 11–15)
FASTING PATIENT LIPID ANSWER: YES
FASTING STATUS PATIENT QL REPORTED: YES
GLOBULIN PLAS-MCNC: 2.2 G/DL (ref 2–3.5)
GLUCOSE BLD-MCNC: 96 MG/DL (ref 70–99)
HCT VFR BLD AUTO: 40 %
HDLC SERPL-MCNC: 76 MG/DL (ref 40–59)
HGB BLD-MCNC: 13 G/DL
IMM GRANULOCYTES # BLD AUTO: 0.02 X10(3) UL (ref 0–1)
IMM GRANULOCYTES NFR BLD: 0.3 %
LDLC SERPL CALC-MCNC: 97 MG/DL (ref ?–100)
LYMPHOCYTES # BLD AUTO: 3.02 X10(3) UL (ref 1–4)
LYMPHOCYTES NFR BLD AUTO: 40.2 %
MCH RBC QN AUTO: 30 PG (ref 26–34)
MCHC RBC AUTO-ENTMCNC: 32.5 G/DL (ref 31–37)
MCV RBC AUTO: 92.2 FL
MONOCYTES # BLD AUTO: 0.68 X10(3) UL (ref 0.1–1)
MONOCYTES NFR BLD AUTO: 9 %
NEUTROPHILS # BLD AUTO: 3.02 X10 (3) UL (ref 1.5–7.7)
NEUTROPHILS # BLD AUTO: 3.02 X10(3) UL (ref 1.5–7.7)
NEUTROPHILS NFR BLD AUTO: 40.1 %
NONHDLC SERPL-MCNC: 106 MG/DL (ref ?–130)
OSMOLALITY SERPL CALC.SUM OF ELEC: 289 MOSM/KG (ref 275–295)
PLATELET # BLD AUTO: 241 10(3)UL (ref 150–450)
POTASSIUM SERPL-SCNC: 3.8 MMOL/L (ref 3.5–5.1)
PROT SERPL-MCNC: 6.1 G/DL (ref 5.7–8.2)
RBC # BLD AUTO: 4.34 X10(6)UL
SODIUM SERPL-SCNC: 140 MMOL/L (ref 136–145)
TRIGL SERPL-MCNC: 42 MG/DL (ref 30–149)
TSI SER-ACNC: 1.24 MIU/ML (ref 0.55–4.78)
VIT D+METAB SERPL-MCNC: 76.5 NG/ML (ref 30–100)
VLDLC SERPL CALC-MCNC: 7 MG/DL (ref 0–30)
WBC # BLD AUTO: 7.5 X10(3) UL (ref 4–11)

## 2024-09-15 PROCEDURE — 80061 LIPID PANEL: CPT

## 2024-09-15 PROCEDURE — 84443 ASSAY THYROID STIM HORMONE: CPT

## 2024-09-15 PROCEDURE — 80053 COMPREHEN METABOLIC PANEL: CPT

## 2024-09-15 PROCEDURE — 82306 VITAMIN D 25 HYDROXY: CPT

## 2024-09-15 PROCEDURE — 85025 COMPLETE CBC W/AUTO DIFF WBC: CPT

## 2024-09-15 PROCEDURE — 36415 COLL VENOUS BLD VENIPUNCTURE: CPT

## 2024-09-25 DIAGNOSIS — L20.82 FLEXURAL ECZEMA: ICD-10-CM

## 2024-10-01 NOTE — TELEPHONE ENCOUNTER
Please review. Protocol Failed; No Protocol    Requested Prescriptions   Pending Prescriptions Disp Refills    TRIAMCINOLONE 0.1 % External Ointment [Pharmacy Med Name: TRIAMCINOLONE 0.1% OINTMENT 30GM] 30 g 0     Sig: APPLY TOPICALLY TO THE AFFECTED AREA TWICE DAILY       There is no refill protocol information for this order              Recent Outpatient Visits              1 month ago Essential hypertension    [facility] [place]     Office Visit    5 months ago [facility]     [facility] Visit    6 months ago Lichen amyloidosis (HCC)    [facility] [place] Ian Esparza MD    Office Visit    7 months ago Membranous nephropathy 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    [facility] Reji Shrestha MD    Office Visit    8 months ago [facility]ential [facility]    [facility] Visit

## 2024-10-05 RX ORDER — TRIAMCINOLONE ACETONIDE 1 MG/G
1 OINTMENT TOPICAL 2 TIMES DAILY
Qty: 30 G | Refills: 0 | Status: SHIPPED | OUTPATIENT
Start: 2024-10-05

## 2024-11-25 ENCOUNTER — OFFICE VISIT (OUTPATIENT)
Dept: FAMILY MEDICINE CLINIC | Facility: CLINIC | Age: 44
End: 2024-11-25

## 2024-11-25 VITALS
BODY MASS INDEX: 19.21 KG/M2 | WEIGHT: 104.38 LBS | DIASTOLIC BLOOD PRESSURE: 80 MMHG | SYSTOLIC BLOOD PRESSURE: 124 MMHG | HEIGHT: 62 IN | HEART RATE: 71 BPM | TEMPERATURE: 97 F

## 2024-11-25 DIAGNOSIS — Z23 NEED FOR VACCINATION: ICD-10-CM

## 2024-11-25 DIAGNOSIS — F98.8 ATTENTION DEFICIT DISORDER (ADD) WITHOUT HYPERACTIVITY: ICD-10-CM

## 2024-11-25 DIAGNOSIS — J30.89 OTHER ALLERGIC RHINITIS: ICD-10-CM

## 2024-11-25 DIAGNOSIS — I10 ESSENTIAL HYPERTENSION: Primary | ICD-10-CM

## 2024-11-25 DIAGNOSIS — J30.1 ALLERGIC RHINITIS DUE TO POLLEN, UNSPECIFIED SEASONALITY: ICD-10-CM

## 2024-11-25 DIAGNOSIS — J45.41 MODERATE PERSISTENT ASTHMA WITH EXACERBATION (HCC): ICD-10-CM

## 2024-11-25 DIAGNOSIS — E55.9 VITAMIN D DEFICIENCY: ICD-10-CM

## 2024-11-25 PROCEDURE — 90656 IIV3 VACC NO PRSV 0.5 ML IM: CPT | Performed by: FAMILY MEDICINE

## 2024-11-25 PROCEDURE — 90471 IMMUNIZATION ADMIN: CPT | Performed by: FAMILY MEDICINE

## 2024-11-25 PROCEDURE — 99214 OFFICE O/P EST MOD 30 MIN: CPT | Performed by: FAMILY MEDICINE

## 2024-11-25 PROCEDURE — 3074F SYST BP LT 130 MM HG: CPT | Performed by: FAMILY MEDICINE

## 2024-11-25 PROCEDURE — 3079F DIAST BP 80-89 MM HG: CPT | Performed by: FAMILY MEDICINE

## 2024-11-25 PROCEDURE — 3008F BODY MASS INDEX DOCD: CPT | Performed by: FAMILY MEDICINE

## 2024-11-25 RX ORDER — DEXTROAMPHETAMINE SACCHARATE, AMPHETAMINE ASPARTATE, DEXTROAMPHETAMINE SULFATE AND AMPHETAMINE SULFATE 7.5; 7.5; 7.5; 7.5 MG/1; MG/1; MG/1; MG/1
30 TABLET ORAL DAILY
Qty: 30 TABLET | Refills: 0 | Status: SHIPPED | OUTPATIENT
Start: 2024-11-25 | End: 2024-12-25

## 2024-11-25 RX ORDER — MONTELUKAST SODIUM 10 MG/1
10 TABLET ORAL NIGHTLY
Qty: 90 TABLET | Refills: 3 | Status: SHIPPED | OUTPATIENT
Start: 2024-11-25

## 2024-11-25 RX ORDER — ERGOCALCIFEROL 1.25 MG/1
50000 CAPSULE, LIQUID FILLED ORAL WEEKLY
Qty: 12 CAPSULE | Refills: 3 | Status: SHIPPED | OUTPATIENT
Start: 2024-11-25

## 2024-11-25 RX ORDER — DEXTROAMPHETAMINE SACCHARATE, AMPHETAMINE ASPARTATE, DEXTROAMPHETAMINE SULFATE AND AMPHETAMINE SULFATE 7.5; 7.5; 7.5; 7.5 MG/1; MG/1; MG/1; MG/1
30 TABLET ORAL DAILY
Qty: 30 TABLET | Refills: 0 | Status: SHIPPED | OUTPATIENT
Start: 2024-12-26 | End: 2025-01-25

## 2024-11-25 RX ORDER — DEXTROAMPHETAMINE SACCHARATE, AMPHETAMINE ASPARTATE, DEXTROAMPHETAMINE SULFATE AND AMPHETAMINE SULFATE 7.5; 7.5; 7.5; 7.5 MG/1; MG/1; MG/1; MG/1
30 TABLET ORAL DAILY
Qty: 30 TABLET | Refills: 0 | Status: SHIPPED | OUTPATIENT
Start: 2025-01-26 | End: 2025-02-25

## 2024-11-25 RX ORDER — LOSARTAN POTASSIUM 100 MG/1
100 TABLET ORAL DAILY
Qty: 90 TABLET | Refills: 3 | Status: SHIPPED | OUTPATIENT
Start: 2024-11-25

## 2024-11-25 NOTE — PROGRESS NOTES
Patient ID: Khushboo Milan is a 43 year old female.    HTN  Pertinent negatives include no chest pain or shortness of breath.     Chief Complaint   Patient presents with    HTN     Follow up / refills    ADD     Follow up / refills      Last seen on 08/26/2024.    I reviewed her blood pressures. Pt regularly consults with nephrology. Pt is on once daily 30 mg Adderall for ADD, that she feels provides relief. She denies chest pain or SOB.  No palpitations.    She has requested a flu shot and states she only receives her flu shots here and would not of gotten a flu shot at the pharmacy.       Wt Readings from Last 6 Encounters:   11/25/24 104 lb 6.4 oz   08/26/24 99 lb 9.6 oz   04/29/24 108 lb   02/26/24 109 lb   01/08/24 107 lb   10/10/23 112 lb       BMI Readings from Last 6 Encounters:   11/25/24 19.10 kg/m²   08/26/24 18.22 kg/m²   04/29/24 19.75 kg/m²   02/26/24 19.94 kg/m²   01/08/24 19.57 kg/m²   10/10/23 20.49 kg/m²       BP Readings from Last 6 Encounters:   11/25/24 124/80   08/26/24 (!) 145/103   04/29/24 (!) 142/93   02/26/24 139/82   01/08/24 136/87   10/13/23 145/85         Review of Systems   Respiratory:  Negative for shortness of breath.    Cardiovascular:  Negative for chest pain.           Medical History:      Past Medical History:    Asthma (HCC)    Attention deficit disorder    Extrinsic asthma, unspecified    High blood pressure    Kidney disease    Renal disorder    protein in urine    Unspecified essential hypertension    Verruca vulgaris    Central Pubic Area       Past Surgical History:   Procedure Laterality Date    Other      KIDNEY BIOPSY    Shoulder arthroscopy Right 2018    Rotator cuff repair          Current Outpatient Medications   Medication Sig Dispense Refill    triamcinolone 0.1 % External Ointment Apply 1 Application topically 2 (two) times daily. APPLY TO AFFECTED AREA 30 g 0    losartan 100 MG Oral Tab Take 1 tablet (100 mg total) by mouth daily. 90 tablet 1     ergocalciferol 1.25 MG (69014 UT) Oral Cap Take 1 capsule (50,000 Units total) by mouth once a week. 12 capsule 1    fluticasone propionate 50 MCG/ACT Nasal Suspension 2 sprays by Each Nare route daily. 3 each 2    amphetamine-dextroamphetamine (ADDERALL) 30 MG Oral Tab Take 1 tablet (30 mg total) by mouth daily. 30 tablet 0    albuterol 108 (90 Base) MCG/ACT Inhalation Aero Soln INHALE TWO PUFFS INTO THE LUNGS EVERY 4 (FOUR) HOURS AS NEEDED FOR WHEEZING OR SHORTNESS OF BREATH 8.5 g 3    amphetamine-dextroamphetamine (ADDERALL) 30 MG Oral Tab Take 1 tablet (30 mg total) by mouth daily. 30 tablet 0    montelukast 10 MG Oral Tab Take 1 tablet (10 mg total) by mouth nightly. Helps with allergies and asthma. 90 tablet 2    clobetasol 0.05 % External Ointment Apply to the affected areas twice daily Monday-Friday. Take weekends off. Advised to AVOID on face, under breasts, underarms and groin. 60 g 3    ammonium lactate 12 % External Lotion Mix with clobetasol and apply twice daily  Monday-Friday to affected areas on legs 225 g 11    Vitamin C 500 MG Oral Tab Take 1 tablet (500 mg total) by mouth daily.      Zinc 50 MG Oral Tab Take 1 tablet by mouth daily.      magnesium 250 MG Oral Tab Take 1 tablet (250 mg total) by mouth daily.      BIOTIN OR Take 1 tablet by mouth daily.      cyanocobalamin 100 MCG Oral Tab Take 2.5 tablets (250 mcg total) by mouth daily.      Collagen 500 MG Oral Cap Take by mouth.       Allergies:Allergies[1]     Physical Exam:       Physical Exam  Blood pressure (!) 158/92, pulse 71, temperature 97.2 °F (36.2 °C), temperature source Temporal, height 5' 2\" (1.575 m), weight 104 lb 6.4 oz, last menstrual period 11/23/2024, not currently breastfeeding.    Vitals:    11/25/24 1141 11/25/24 1152   BP: (!) 158/92 124/80   Pulse: 71    Temp: 97.2 °F (36.2 °C)    TempSrc: Temporal    Weight: 104 lb 6.4 oz    Height: 5' 2\" (1.575 m)         Physical Exam   Constitutional: Patient is oriented to person,  place, and time. Patient appears well-developed and well-nourished. No distress.   Cardiovascular: Normal rate, regular rhythm and normal heart sounds.    Pulmonary/Chest: Effort normal and breath sounds normal. No respiratory distress.   Neurological: Patient is alert and oriented to person, place, and time.   Skin: Skin is warm.   Psychiatry: Normal mood and affect.    Vitals reviewed.           Assessment/Plan:      Diagnoses and all orders for this visit:    Essential hypertension  -     losartan 100 MG Oral Tab; Take 1 tablet (100 mg total) by mouth daily.  Doing well with blood pressure and will continue medication.  She will make an appointment to see her nephrologist  Need for vaccination  -     INFLUENZA VACCINE, TRI, PRESERV FREE, 0.5 ML  She states she never did get a flu shot this year and the 1 that was placed for October 25, 2024 is a mistake as she only gets her flu shots through us.  I do not see any visit date on October 25, 2024.  Attention deficit disorder (ADD) without hyperactivity  -     amphetamine-dextroamphetamine (ADDERALL) 30 MG Oral Tab; Take 1 tablet (30 mg total) by mouth daily.  -     amphetamine-dextroamphetamine (ADDERALL) 30 MG Oral Tab; Take 1 tablet (30 mg total) by mouth daily.  -     amphetamine-dextroamphetamine (ADDERALL) 30 MG Oral Tab; Take 1 tablet (30 mg total) by mouth daily.  Doing very well with the Adderall and will continue  Vitamin D deficiency  -     ergocalciferol 1.25 MG (47036 UT) Oral Cap; Take 1 capsule (50,000 Units total) by mouth once a week.    Allergic rhinitis due to pollen, unspecified seasonality  -     montelukast 10 MG Oral Tab; Take 1 tablet (10 mg total) by mouth nightly. Helps with allergies and asthma.  While here she would like refills of her medication.  Other allergic rhinitis  -     montelukast 10 MG Oral Tab; Take 1 tablet (10 mg total) by mouth nightly. Helps with allergies and asthma.    Moderate persistent asthma with exacerbation (HCC)  -      montelukast 10 MG Oral Tab; Take 1 tablet (10 mg total) by mouth nightly. Helps with allergies and asthma.  Not needing albuterol often.      Referrals (if applicable)  No orders of the defined types were placed in this encounter.      Follow up if symptoms persist.  Take medicine (if given) as prescribed.  Approach to treatment discussed and patient/family member understands and agrees to plan.     Return in about 5 months (around 4/29/2025) for Physical Exam.    There are no Patient Instructions on file for this visit.    Radha Chun    11/25/2024    By signing my name below, IRadha,  attest that this documentation has been prepared under the direction and in the presence of Neymar Max DO.   Electronically Signed: Radha Chun, 11/25/2024, 11:44 AM.    I, Neymar Max DO,  personally performed the services described in this documentation. All medical record entries made by the scribe were at my direction and in my presence.  I have reviewed the chart and discharge instructions (if applicable) and agree that the record reflects my personal performance and is accurate and complete.  Neymar Max DO, 11/25/2024, 12:32 PM                  [1] No Known Allergies

## 2024-12-27 DIAGNOSIS — J45.20 MILD INTERMITTENT ASTHMA WITHOUT COMPLICATION (HCC): ICD-10-CM

## 2025-01-01 RX ORDER — ALBUTEROL SULFATE 90 UG/1
2 INHALANT RESPIRATORY (INHALATION) EVERY 4 HOURS PRN
Qty: 1 EACH | Refills: 3 | Status: SHIPPED | OUTPATIENT
Start: 2025-01-01

## 2025-01-01 NOTE — TELEPHONE ENCOUNTER
Dr. Max, please kindly review; protocol failed    No future appointments.  Last office visit: 11/25/2024    Requested Prescriptions   Pending Prescriptions Disp Refills    ALBUTEROL 108 (90 Base) MCG/ACT Inhalation Aero Soln [Pharmacy Med Name: ALBUTEROL HFA INH (200 PUFFS) 8.5GM] 8.5 g 3     Sig: INHALE 2 PUFFS INTO THE LUNGS EVERY 4 HOURS AS NEEDED FOR WHEEZING OR SHORTNESS OF BREATH       Asthma & COPD Medication Protocol Failed - 1/1/2025  4:41 PM        Failed - ACT Score greater than or equal to 20     5 (4/29/2024 11:52 AM)            Passed - Appointment in past 6 or next 3 months      Recent Outpatient Visits              1 month ago Essential hypertension    [facility], Neymar Vera,     Office Visit    4 months ago Essential hypertension    [facility], Neymar Vera,     Office Visit    8 months ago [facility]     [facility]    9 months ago Lichen amyloidosis (HCC)    [facility], [place] Michalik, Daniel, MD    Office Visit    10 months ago Membranous nephropathy 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    [facility] Reji Shrestha MD    Office Visit                      Passed - ACT recorded in the last 12 months     5 (4/29/2024 11:52 AM)                   Recent Outpatient Visits              1 month ago Essential hypertension    [facility], Neymar Vera,     Office Visit    4 months ago [facility]     [facility]    8 months ago [facility]     Office Visit    9 months ago Lichen amyloidosis (HCC)    [facility],  [place] Michalik, Daniel, MD    Office Visit    10 months ago Membranous nephropathy 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    [facility], [facility], [place] Reji Shrestha MD    Office Visit

## 2025-03-01 DIAGNOSIS — F98.8 ATTENTION DEFICIT DISORDER (ADD) WITHOUT HYPERACTIVITY: ICD-10-CM

## 2025-03-01 NOTE — TELEPHONE ENCOUNTER
Patient was told by pharmacy to contact the office for refill amphetamine-dextroamphetamine (ADDERALL) 30 MG Oral Tab     Will be out in two days  [id number] - [place], [place] - [address] [facility] [address], [phone], [phone]

## 2025-03-03 RX ORDER — DEXTROAMPHETAMINE SACCHARATE, AMPHETAMINE ASPARTATE, DEXTROAMPHETAMINE SULFATE AND AMPHETAMINE SULFATE 7.5; 7.5; 7.5; 7.5 MG/1; MG/1; MG/1; MG/1
30 TABLET ORAL DAILY
Qty: 30 TABLET | Refills: 0 | Status: SHIPPED | OUTPATIENT
Start: 2025-03-03

## 2025-03-03 NOTE — TELEPHONE ENCOUNTER
Please review; protocol failed/ has no protocol        Navin Singh days ago     YQ  Patient was told by pharmacy to contact the office for refill amphetamine-dextroamphetamine (ADDERALL) 30 MG Oral Tab      Will be out in two days      Recent fills: 01/29/2025,12/27/2024,11/29/2024  Last Rx written: 11/25/2024  Last Office Visit 11/25/2025    Recent Visits  Date Type Provider Dept   11/25/24 Office Visit Neymar Max DO [facility]

## 2025-04-28 ENCOUNTER — OFFICE VISIT (OUTPATIENT)
Dept: FAMILY MEDICINE CLINIC | Facility: CLINIC | Age: 45
End: 2025-04-28
Payer: COMMERCIAL

## 2025-04-28 VITALS
WEIGHT: 106 LBS | SYSTOLIC BLOOD PRESSURE: 112 MMHG | BODY MASS INDEX: 19.51 KG/M2 | HEART RATE: 103 BPM | TEMPERATURE: 98 F | HEIGHT: 62 IN | DIASTOLIC BLOOD PRESSURE: 78 MMHG

## 2025-04-28 DIAGNOSIS — J45.20 MILD INTERMITTENT ASTHMA WITHOUT COMPLICATION (HCC): ICD-10-CM

## 2025-04-28 DIAGNOSIS — F98.8 ATTENTION DEFICIT DISORDER (ADD) WITHOUT HYPERACTIVITY: ICD-10-CM

## 2025-04-28 DIAGNOSIS — E55.9 VITAMIN D DEFICIENCY: ICD-10-CM

## 2025-04-28 DIAGNOSIS — J30.1 ALLERGIC RHINITIS DUE TO POLLEN, UNSPECIFIED SEASONALITY: ICD-10-CM

## 2025-04-28 DIAGNOSIS — N05.5 MEMBRANOPROLIFERATIVE NEPHROPATHY: ICD-10-CM

## 2025-04-28 DIAGNOSIS — H53.9 VISUAL DISTURBANCE OF ONE EYE: ICD-10-CM

## 2025-04-28 DIAGNOSIS — I10 ESSENTIAL HYPERTENSION: ICD-10-CM

## 2025-04-28 DIAGNOSIS — Z12.31 VISIT FOR SCREENING MAMMOGRAM: ICD-10-CM

## 2025-04-28 DIAGNOSIS — Z00.00 ADULT GENERAL MEDICAL EXAM: Primary | ICD-10-CM

## 2025-04-28 RX ORDER — FLUTICASONE PROPIONATE 50 MCG
2 SPRAY, SUSPENSION (ML) NASAL DAILY
Qty: 3 EACH | Refills: 2 | Status: SHIPPED | OUTPATIENT
Start: 2025-04-28

## 2025-04-28 RX ORDER — MONTELUKAST SODIUM 10 MG/1
10 TABLET ORAL NIGHTLY
Qty: 90 TABLET | Refills: 3 | Status: SHIPPED | OUTPATIENT
Start: 2025-04-28

## 2025-04-28 RX ORDER — DEXTROAMPHETAMINE SACCHARATE, AMPHETAMINE ASPARTATE, DEXTROAMPHETAMINE SULFATE AND AMPHETAMINE SULFATE 7.5; 7.5; 7.5; 7.5 MG/1; MG/1; MG/1; MG/1
30 TABLET ORAL DAILY
Qty: 30 TABLET | Refills: 0 | Status: SHIPPED | OUTPATIENT
Start: 2025-04-28 | End: 2025-05-28

## 2025-04-28 RX ORDER — DEXTROAMPHETAMINE SACCHARATE, AMPHETAMINE ASPARTATE, DEXTROAMPHETAMINE SULFATE AND AMPHETAMINE SULFATE 7.5; 7.5; 7.5; 7.5 MG/1; MG/1; MG/1; MG/1
30 TABLET ORAL DAILY
Qty: 30 TABLET | Refills: 0 | Status: SHIPPED | OUTPATIENT
Start: 2025-06-29 | End: 2025-07-29

## 2025-04-28 RX ORDER — DEXTROAMPHETAMINE SACCHARATE, AMPHETAMINE ASPARTATE, DEXTROAMPHETAMINE SULFATE AND AMPHETAMINE SULFATE 7.5; 7.5; 7.5; 7.5 MG/1; MG/1; MG/1; MG/1
30 TABLET ORAL DAILY
Qty: 30 TABLET | Refills: 0 | Status: SHIPPED | OUTPATIENT
Start: 2025-05-29 | End: 2025-06-28

## 2025-04-28 NOTE — PROGRESS NOTES
Patient ID: Khushboo Milan is a 44 year old female.         The following individual(s) verbally consented to be recorded using ambient AI listening technology and understand that they can each withdraw their consent to this listening technology at any point by asking the clinician to turn off or pause the recording:    Patient name: Khushboo Milan  Additional names:             HPI  Chief Complaint   Patient presents with    Routine Physical       History of Present Illness  Khushboo Milan is a 44 year old female who presents with a history of transient visual disturbance in the left eye.    She has experienced a visual disturbance in her left eye, described as a blurry spot that appeared when she closed her right eye.  The view of the left eye would be majority clear and normal except for a blurry spot that was in the same area every single time she shot her right eye.  This symptom persisted intermittently for approximately two months and resolved about a month ago. No associated headaches or other visual symptoms. She has not yet consulted an ophthalmologist regarding this issue.    She is currently taking several medications including vitamin D once a week, Adderall, Flonase for allergies, albuterol as needed, and montelukast (Singulair). Her allergies have been particularly severe, exacerbating her asthma symptoms, necessitating the use of albuterol more frequently.    Her past medical history includes hypertension, for which she is taking losartan. She is also up to date with her gynecological care and vaccinations, including a pneumonia vaccine received two years ago.    No palpitations, chest pain, or headaches.      Health Maintenance   Topic Date Due    COVID-19 Vaccine (4 - 2024-25 season) 09/01/2024    Annual Depression Screening  01/01/2025    Mammogram  04/08/2025    Asthma Control Test  04/29/2025    Annual Physical  04/29/2025    Pap Smear  04/06/2026    DTaP,Tdap,and Td Vaccines (4 - Td or  Tdap) 11/05/2030    Influenza Vaccine  Completed    Pneumococcal Vaccine: Birth to 50yrs  Completed    Meningococcal B Vaccine  Aged Out       Results      =======================================================    Lab Results   Component Value Date    WBC 7.5 09/15/2024    RBC 4.34 09/15/2024    HGB 13.0 09/15/2024    HCT 40.0 09/15/2024    .0 09/15/2024    MPV 8.4 05/03/2018    MCV 92.2 09/15/2024    MCH 30.0 09/15/2024    MCHC 32.5 09/15/2024    RDW 13.5 09/15/2024    NEPRELIM 3.02 09/15/2024    NEUT 6.1 04/14/2015    LYMPH 4.6 (H) 04/14/2015    MON 1.1 (H) 04/14/2015    EOS 1.1 (H) 04/14/2015    BASO 0.1 04/14/2015    NEPERCENT 40.1 09/15/2024    LYPERCENT 40.2 09/15/2024    MOPERCENT 9.0 09/15/2024    EOPERCENT 9.2 09/15/2024    BAPERCENT 1.2 09/15/2024    NE 3.02 09/15/2024    LYMABS 3.02 09/15/2024    MOABSO 0.68 09/15/2024    EOABSO 0.69 09/15/2024    BAABSO 0.09 09/15/2024       Lab Results   Component Value Date    GLU 96 09/15/2024    BUN 11 09/15/2024    BUNCREA 13.6 09/15/2024    CREATSERUM 0.81 09/15/2024    ANIONGAP 5 09/15/2024    GFRNAA 98 09/13/2021    GFRAA 113 09/13/2021    CA 8.7 09/15/2024    OSMOCALC 289 09/15/2024    ALKPHO 63 09/15/2024    AST 14 09/15/2024    ALT 12 09/15/2024    ALKPHOS 61 03/08/2015    BILT 0.4 09/15/2024    TP 6.1 09/15/2024    ALB 3.9 09/15/2024    GLOBULIN 2.2 09/15/2024    AGRATIO 1.4 03/08/2015     09/15/2024    K 3.8 09/15/2024     09/15/2024    CO2 26.0 09/15/2024       Lab Results   Component Value Date    GLU 96 09/15/2024    BUN 11 09/15/2024    CREATSERUM 0.81 09/15/2024    BUNCREA 13.6 09/15/2024    ANIONGAP 5 09/15/2024    GFRAA 113 09/13/2021    GFRNAA 98 09/13/2021    CA 8.7 09/15/2024     09/15/2024    K 3.8 09/15/2024     09/15/2024    CO2 26.0 09/15/2024    OSMOCALC 289 09/15/2024       Lab Results   Component Value Date    COLORUR Yellow 02/26/2024    CLARITY Clear 02/26/2024    SPECGRAVITY 1.025 02/26/2024    GLUUR Normal  02/26/2024    BILUR Negative 02/26/2024    KETUR Negative 02/26/2024    BLOODURINE Negative 02/26/2024    PHURINE 5.5 02/26/2024    PROUR Trace (A) 02/26/2024    UROBILINOGEN Normal 02/26/2024    NITRITE Negative 02/26/2024    LEUUR Negative 02/26/2024    ASCACIDURINE Negative 03/08/2015    NMIC Microscopic not indicated 02/26/2024    WBCUR 1-5 04/10/2023    RBCUR 0-2 04/10/2023    EPIUR Few (A) 04/10/2023    BACUR None Seen 04/10/2023    HYLUR 1 01/25/2021    MICCOM Completed 03/08/2015     Lab Results   Component Value Date     04/10/2023    A1C 5.1 04/10/2023       Lab Results   Component Value Date    MALBP 4.80 02/26/2024    CREUR 182.60 02/26/2024    CREAURINE 236.1 08/09/2015    MIALBURINE 216.0 (H) 08/09/2015    MCRRATIOUR 914.9 (H) 08/09/2015       Lab Results   Component Value Date    CHOLEST 182 09/15/2024    TRIG 42 09/15/2024    HDL 76 (H) 09/15/2024    LDL 97 09/15/2024    VLDL 7 09/15/2024    NONHDLC 106 09/15/2024    CALCNONHDL 109 03/08/2015     TSH (mIU/mL)   Date Value   09/15/2024 1.240     TSH (S) (uIU/mL)   Date Value   03/08/2015 2.19       No results found for: \"B12\", \"VITB12\"    No results found for: \"IRON\", \"IRONTOT\"    No results found for: \"SAT\"    No results found for: \"IRON\", \"IRONTOT\", \"TIBC\", \"IRONSAT\", \"TRANSFERRIN\", \"TIBCP\", \"IRONBIND\", \"SAT\", \"SATUR\"    No results found for: \"PETER\"    Lab Results   Component Value Date    VITD 76.5 09/15/2024    XKQB49KO 42.5 03/08/2015     No results found for: \"PSA\", \"QPSA\", \"TOTPSASCREEN\"    =======================================================    Wt Readings from Last 6 Encounters:   04/28/25 106 lb (48.1 kg)   11/25/24 104 lb 6.4 oz (47.4 kg)   08/26/24 99 lb 9.6 oz (45.2 kg)   04/29/24 108 lb (49 kg)   02/26/24 109 lb (49.4 kg)   01/08/24 107 lb (48.5 kg)               BMI Readings from Last 6 Encounters:   04/28/25 19.39 kg/m²   11/25/24 19.10 kg/m²   08/26/24 18.22 kg/m²   04/29/24 19.75 kg/m²   02/26/24 19.94 kg/m²   01/08/24 19.57  kg/m²       BP Readings from Last 6 Encounters:   04/28/25 (!) 138/93   11/25/24 124/80   08/26/24 (!) 145/103   04/29/24 (!) 142/93   02/26/24 139/82   01/08/24 136/87         Review of Systems  No exertional cardiac chest pain or shortness of breath unless stated in HPI which would take precedence.  See HPI for further review of systems.    Past Medical History:    Asthma (HCC)    Attention deficit disorder    Extrinsic asthma, unspecified    High blood pressure    Kidney disease    Renal disorder    protein in urine    Unspecified essential hypertension    Verruca vulgaris    Central Pubic Area       Past Surgical History:   Procedure Laterality Date    Other      KIDNEY BIOPSY    Shoulder arthroscopy Right 2018    Rotator cuff repair       Social History     Socioeconomic History    Marital status: Single     Spouse name: Not on file    Number of children: Not on file    Years of education: Not on file    Highest education level: Not on file   Occupational History    Not on file   Tobacco Use    Smoking status: Former     Current packs/day: 1.00     Average packs/day: 1 pack/day for 3.0 years (3.0 ttl pk-yrs)     Types: Cigarettes    Smokeless tobacco: Never   Vaping Use    Vaping status: Never Used   Substance and Sexual Activity    Alcohol use: Not Currently     Comment: occ    Drug use: No    Sexual activity: Not on file   Other Topics Concern     Service Not Asked    Blood Transfusions Not Asked    Caffeine Concern Yes     Comment: coffee,    Occupational Exposure Not Asked    Hobby Hazards Not Asked    Sleep Concern Not Asked    Stress Concern Not Asked    Weight Concern Not Asked    Special Diet Not Asked    Back Care Not Asked    Exercise Not Asked    Bike Helmet Not Asked    Seat Belt Not Asked    Self-Exams Not Asked    Grew up on a farm Not Asked    History of tanning No    Outdoor occupation Not Asked    Breast feeding No    Reaction to local anesthetic No    Pt has a pacemaker No    Pt has a  defibrillator No   Social History Narrative    Not on file     Social Drivers of Health     Food Insecurity: No Food Insecurity (4/28/2025)    NCSS - Food Insecurity     Worried About Running Out of Food in the Last Year: No     Ran Out of Food in the Last Year: No   Transportation Needs: No Transportation Needs (4/28/2025)    NCSS - Transportation     Lack of Transportation: No   Stress: Not on file   Housing Stability: Not At Risk (4/28/2025)    NCSS - Housing/Utilities     Has Housing: Yes     Worried About Losing Housing: No     Unable to Get Utilities: No          Current Outpatient Medications   Medication Sig Dispense Refill    amphetamine-dextroamphetamine (ADDERALL) 30 MG Oral Tab Take 1 tablet (30 mg total) by mouth daily. 30 tablet 0    albuterol 108 (90 Base) MCG/ACT Inhalation Aero Soln Inhale 2 puffs into the lungs every 4 (four) hours as needed for Wheezing or Shortness of Breath. 1 each 3    ergocalciferol 1.25 MG (93662 UT) Oral Cap Take 1 capsule (50,000 Units total) by mouth once a week. 12 capsule 3    losartan 100 MG Oral Tab Take 1 tablet (100 mg total) by mouth daily. 90 tablet 3    montelukast 10 MG Oral Tab Take 1 tablet (10 mg total) by mouth nightly. Helps with allergies and asthma. 90 tablet 3    triamcinolone 0.1 % External Ointment Apply 1 Application topically 2 (two) times daily. APPLY TO AFFECTED AREA 30 g 0    fluticasone propionate 50 MCG/ACT Nasal Suspension 2 sprays by Each Nare route daily. 3 each 2    clobetasol 0.05 % External Ointment Apply to the affected areas twice daily Monday-Friday. Take weekends off. Advised to AVOID on face, under breasts, underarms and groin. 60 g 3    ammonium lactate 12 % External Lotion Mix with clobetasol and apply twice daily  Monday-Friday to affected areas on legs 225 g 11    Vitamin C 500 MG Oral Tab Take 1 tablet (500 mg total) by mouth daily.      Zinc 50 MG Oral Tab Take 1 tablet by mouth daily.      magnesium 250 MG Oral Tab Take 1 tablet  (250 mg total) by mouth daily.      BIOTIN OR Take 1 tablet by mouth daily.      cyanocobalamin 100 MCG Oral Tab Take 2.5 tablets (250 mcg total) by mouth daily.      Collagen 500 MG Oral Cap Take by mouth.       Allergies:No Known Allergies   PHYSICAL EXAM:   Physical Exam  Physical Exam   Constitutional: . She appears well-developed and well-nourished. No distress.   HENT:   Head: Normocephalic.   Right Ear: Tympanic membrane and ear canal normal.   Left Ear: Tympanic membrane and ear canal normal.   Mouth/Throat: Oropharynx is clear and moist and mucous membranes are normal.   Eyes: Conjunctivae and EOM are normal. Pupils are equal, round, and reactive to light.  No nystagmus.  Neck: Normal range of motion. Neck supple. No thyromegaly present.   Cardiovascular: Normal rate, regular rhythm and no murmur heard.  Pulmonary/Chest: Effort normal and breath sounds normal. No respiratory distress.   Abdominal: Soft. Bowel sounds are normal. There is no hepatosplenomegaly. There is no tenderness.   Lymphadenopathy:     She has no  cervical adenopathy.   Neurological: She is alert and oriented to person, place, and time . She has normal reflexes. No cranial nerve deficit.   Skin: Skin is warm and dry. No rash noted.   Psychiatric: She has a normal mood and affect   Lower legs: No edema of the legs bilaterally.    Physical Exam  VITALS: BP- 112/78  HEENT: External auditory canals clear bilaterally.  EXTREMITIES: No edema.  NEUROLOGICAL: Pupils equal, round, and reactive to light. Extraocular movements intact.    Vitals reviewed.    Blood pressure (!) 138/93, pulse 103, temperature 97.6 °F (36.4 °C), temperature source Tympanic, height 5' 2\" (1.575 m), weight 106 lb (48.1 kg), last menstrual period 04/14/2025, not currently breastfeeding.         Vitals:    04/28/25 1118 04/28/25 1127   BP: (!) 138/93 112/78   Pulse: 103    Temp: 97.6 °F (36.4 °C)    TempSrc: Tympanic    Weight: 106 lb (48.1 kg)    Height: 5' 2\" (1.575 m)             ASSESSMENT/PLAN:     Diagnoses and all orders for this visit:    Adult general medical exam  -     CBC With Differential With Platelet; Future  -     Comp Metabolic Panel (14); Future  -     Lipid Panel; Future  -     Assay, Thyroid Stim Hormone; Future    Attention deficit disorder (ADD) without hyperactivity  -     amphetamine-dextroamphetamine (ADDERALL) 30 MG Oral Tab; Take 1 tablet (30 mg total) by mouth daily.  -     amphetamine-dextroamphetamine (ADDERALL) 30 MG Oral Tab; Take 1 tablet (30 mg total) by mouth daily.  -     amphetamine-dextroamphetamine (ADDERALL) 30 MG Oral Tab; Take 1 tablet (30 mg total) by mouth daily.  No issue with the medications  Mild intermittent asthma without complication (HCC)  Has her inhalers at home that she takes as needed  Allergic rhinitis due to pollen, unspecified seasonality  -     fluticasone propionate 50 MCG/ACT Nasal Suspension; 2 sprays by Each Nare route daily.  -     montelukast 10 MG Oral Tab; Take 1 tablet (10 mg total) by mouth nightly. Helps with allergies and asthma.    Essential hypertension  Nicely controlled.  Continue present management  Membranoproliferative nephropathy  Follows with nephrology  Vitamin D deficiency  -     Vitamin D; Future    Visit for screening mammogram  -     [facility] YOU 2D+3D SCREENING BILAT (CPT=77067/28966); Future    Visual disturbance of one eye  -     OPHTHALMOLOGY - INTERNAL  Must see ophthalmology.      Referrals (if applicable)  Orders Placed This Encounter   Procedures    OPHTHALMOLOGY - INTERNAL     Or can see his partner Dr Chikis Evans     Referral Priority:   Routine     Referral Type:   OFFICE VISIT     Referred to Provider:   Espinoza Kingsley MD     Requested Specialty:   OPHTHALMOLOGY     Number of Visits Requested:   2         Follow up if symptoms persist.  Take medicine (if given) as prescribed.  Approach to treatment discussed and patient/family member understands and agrees to plan.     No follow-ups on  file.      Assessment & Plan  Wellness Visit  Routine wellness visit with well-controlled blood pressure at 112/78 mmHg. Immunizations are current, including the pneumonia vaccine received two years ago. Labs deferred due to non-fasting status.  - Order labs when fasting  - Continue current medications  - Order mammogram    Visual disturbance  Intermittent visual disturbance in the left eye, described as a blurry spot, resolved one month ago. No associated headaches.  - Refer to ophthalmologist (Teetee or Nathan) for evaluation    Essential hypertension  Blood pressure is well-controlled with current medication regimen.  - Continue Losartan 100 MG oral daily    Moderate persistent asthma  Asthma is managed with current medications. Albuterol is used as needed, especially during allergy exacerbations. No recent exacerbations reported.  - Continue Albuterol 108 (90 Base) MCG/ACT inhalation every 4 hours as needed  - Continue Montelukast 10 MG oral nightly    Allergic rhinitis due to pollen  Allergic rhinitis is managed with Flonase. Recent increase in allergy symptoms necessitating consistent medication use.  - Continue Fluticasone propionate 50 MCG/ACT nasal 2 sprays daily  - Refill Fluticasone propionate prescription    Attention deficit disorder (ADD) without hyperactivity  ADD is managed with Adderall. No palpitations or chest pain reported.  - Continue Amphetamine-dextroamphetamine (Adderall) 30 MG oral daily    Vitamin D deficiency  Vitamin D deficiency is managed with weekly Ergocalciferol. She is compliant with medication.  - Continue Ergocalciferol 1.25 MG oral once a week    Neymar Max DO  4/28/2025

## 2025-05-10 ENCOUNTER — LAB ENCOUNTER (OUTPATIENT)
Dept: LAB | Age: 45
End: 2025-05-10
Attending: FAMILY MEDICINE
Payer: COMMERCIAL

## 2025-05-10 DIAGNOSIS — Z00.00 ADULT GENERAL MEDICAL EXAM: ICD-10-CM

## 2025-05-10 DIAGNOSIS — E55.9 VITAMIN D DEFICIENCY: ICD-10-CM

## 2025-05-10 LAB
ALBUMIN SERPL-MCNC: 4.4 G/DL (ref 3.2–4.8)
ALBUMIN/GLOB SERPL: 1.9 {RATIO} (ref 1–2)
ALP LIVER SERPL-CCNC: 60 U/L (ref 37–98)
ALT SERPL-CCNC: 14 U/L (ref 10–49)
ANION GAP SERPL CALC-SCNC: 6 MMOL/L (ref 0–18)
AST SERPL-CCNC: 21 U/L (ref ?–34)
BASOPHILS # BLD AUTO: 0.1 X10(3) UL (ref 0–0.2)
BASOPHILS NFR BLD AUTO: 1.1 %
BILIRUB SERPL-MCNC: 0.7 MG/DL (ref 0.3–1.2)
BUN BLD-MCNC: 13 MG/DL (ref 9–23)
BUN/CREAT SERPL: 13.7 (ref 10–20)
CALCIUM BLD-MCNC: 9.1 MG/DL (ref 8.7–10.4)
CHLORIDE SERPL-SCNC: 106 MMOL/L (ref 98–112)
CHOLEST SERPL-MCNC: 188 MG/DL (ref ?–200)
CO2 SERPL-SCNC: 25 MMOL/L (ref 21–32)
CREAT BLD-MCNC: 0.95 MG/DL (ref 0.55–1.02)
DEPRECATED RDW RBC AUTO: 47.2 FL (ref 35.1–46.3)
EGFRCR SERPLBLD CKD-EPI 2021: 77 ML/MIN/1.73M2 (ref 60–?)
EOSINOPHIL # BLD AUTO: 0.74 X10(3) UL (ref 0–0.7)
EOSINOPHIL NFR BLD AUTO: 7.8 %
ERYTHROCYTE [DISTWIDTH] IN BLOOD BY AUTOMATED COUNT: 13.8 % (ref 11–15)
FASTING PATIENT LIPID ANSWER: YES
FASTING STATUS PATIENT QL REPORTED: YES
GLOBULIN PLAS-MCNC: 2.3 G/DL (ref 2–3.5)
GLUCOSE BLD-MCNC: 93 MG/DL (ref 70–99)
HCT VFR BLD AUTO: 43.3 % (ref 35–48)
HDLC SERPL-MCNC: 86 MG/DL (ref 40–59)
HGB BLD-MCNC: 13.6 G/DL (ref 12–16)
IMM GRANULOCYTES # BLD AUTO: 0.02 X10(3) UL (ref 0–1)
IMM GRANULOCYTES NFR BLD: 0.2 %
LDLC SERPL CALC-MCNC: 93 MG/DL (ref ?–100)
LYMPHOCYTES # BLD AUTO: 2.69 X10(3) UL (ref 1–4)
LYMPHOCYTES NFR BLD AUTO: 28.4 %
MCH RBC QN AUTO: 29.3 PG (ref 26–34)
MCHC RBC AUTO-ENTMCNC: 31.4 G/DL (ref 31–37)
MCV RBC AUTO: 93.3 FL (ref 80–100)
MONOCYTES # BLD AUTO: 0.79 X10(3) UL (ref 0.1–1)
MONOCYTES NFR BLD AUTO: 8.3 %
NEUTROPHILS # BLD AUTO: 5.13 X10 (3) UL (ref 1.5–7.7)
NEUTROPHILS # BLD AUTO: 5.13 X10(3) UL (ref 1.5–7.7)
NEUTROPHILS NFR BLD AUTO: 54.2 %
NONHDLC SERPL-MCNC: 102 MG/DL (ref ?–130)
OSMOLALITY SERPL CALC.SUM OF ELEC: 284 MOSM/KG (ref 275–295)
PLATELET # BLD AUTO: 253 10(3)UL (ref 150–450)
POTASSIUM SERPL-SCNC: 3.8 MMOL/L (ref 3.5–5.1)
PROT SERPL-MCNC: 6.7 G/DL (ref 5.7–8.2)
RBC # BLD AUTO: 4.64 X10(6)UL (ref 3.8–5.3)
SODIUM SERPL-SCNC: 137 MMOL/L (ref 136–145)
TRIGL SERPL-MCNC: 44 MG/DL (ref 30–149)
TSI SER-ACNC: 1.87 UIU/ML (ref 0.55–4.78)
VIT D+METAB SERPL-MCNC: 85.9 NG/ML (ref 30–100)
VLDLC SERPL CALC-MCNC: 7 MG/DL (ref 0–30)
WBC # BLD AUTO: 9.5 X10(3) UL (ref 4–11)

## 2025-05-10 PROCEDURE — 80061 LIPID PANEL: CPT

## 2025-05-10 PROCEDURE — 36415 COLL VENOUS BLD VENIPUNCTURE: CPT

## 2025-05-10 PROCEDURE — 84443 ASSAY THYROID STIM HORMONE: CPT

## 2025-05-10 PROCEDURE — 85025 COMPLETE CBC W/AUTO DIFF WBC: CPT

## 2025-05-10 PROCEDURE — 82306 VITAMIN D 25 HYDROXY: CPT

## 2025-05-10 PROCEDURE — 80053 COMPREHEN METABOLIC PANEL: CPT

## 2025-05-25 DIAGNOSIS — L20.82 FLEXURAL ECZEMA: ICD-10-CM

## 2025-05-26 NOTE — TELEPHONE ENCOUNTER
Please review.  Protocol failed/has no protocol    Last Office Visit: 04/28/2025  Last written by : GUEVARA Browning  Please advise if refill is appropriate.    Requested Prescriptions     Pending Prescriptions Disp Refills    TRIAMCINOLONE 0.1 % External Ointment [Pharmacy Med Name: TRIAMCINOLONE 0.1% OINTMENT 30GM] 30 g 0     Sig: APPLY TOPICALLY TO THE AFFECTED AREA TWICE DAILY

## 2025-05-27 RX ORDER — TRIAMCINOLONE ACETONIDE 1 MG/G
1 OINTMENT TOPICAL 2 TIMES DAILY
Qty: 30 G | Refills: 0 | Status: SHIPPED | OUTPATIENT
Start: 2025-05-27

## 2025-06-16 ENCOUNTER — HOSPITAL ENCOUNTER (OUTPATIENT)
Dept: MAMMOGRAPHY | Facility: HOSPITAL | Age: 45
Discharge: HOME | End: 2025-06-16
Attending: FAMILY MEDICINE
Payer: COMMERCIAL

## 2025-06-16 DIAGNOSIS — Z12.31 VISIT FOR SCREENING MAMMOGRAM: ICD-10-CM

## 2025-06-16 PROCEDURE — 77067 SCR MAMMO BI INCL CAD: CPT | Performed by: FAMILY MEDICINE

## 2025-06-16 PROCEDURE — 77063 BREAST TOMOSYNTHESIS BI: CPT | Performed by: FAMILY MEDICINE

## 2025-06-30 DIAGNOSIS — J45.20 MILD INTERMITTENT ASTHMA WITHOUT COMPLICATION (HCC): ICD-10-CM

## 2025-06-30 NOTE — TELEPHONE ENCOUNTER
Refill Request:    Current Outpatient Medications   Medication Sig Dispense Refill    albuterol 108 (90 Base) MCG/ACT Inhalation Aero Soln Inhale 2 puffs into the lungs every 4 (four) hours as needed for Wheezing or Shortness of Breath. 1 each 3     Please advise

## 2025-07-06 RX ORDER — ALBUTEROL SULFATE 90 UG/1
2 INHALANT RESPIRATORY (INHALATION) EVERY 4 HOURS PRN
Qty: 54 G | Refills: 3 | Status: SHIPPED | OUTPATIENT
Start: 2025-07-06

## 2025-07-11 ENCOUNTER — HOSPITAL ENCOUNTER (OUTPATIENT)
Dept: MAMMOGRAPHY | Facility: HOSPITAL | Age: 45
Discharge: HOME | End: 2025-07-11
Attending: FAMILY MEDICINE
Payer: COMMERCIAL

## 2025-07-11 DIAGNOSIS — R92.2 INCONCLUSIVE MAMMOGRAM: ICD-10-CM

## 2025-07-11 PROCEDURE — 76642 ULTRASOUND BREAST LIMITED: CPT | Performed by: FAMILY MEDICINE

## 2025-07-11 PROCEDURE — 77066 DX MAMMO INCL CAD BI: CPT | Performed by: FAMILY MEDICINE

## 2025-07-11 PROCEDURE — 77062 BREAST TOMOSYNTHESIS BI: CPT | Performed by: FAMILY MEDICINE

## 2025-07-28 DIAGNOSIS — F98.8 ATTENTION DEFICIT DISORDER (ADD) WITHOUT HYPERACTIVITY: ICD-10-CM

## 2025-07-28 RX ORDER — DEXTROAMPHETAMINE SACCHARATE, AMPHETAMINE ASPARTATE, DEXTROAMPHETAMINE SULFATE AND AMPHETAMINE SULFATE 7.5; 7.5; 7.5; 7.5 MG/1; MG/1; MG/1; MG/1
30 TABLET ORAL DAILY
Qty: 30 TABLET | Refills: 0 | Status: SHIPPED | OUTPATIENT
Start: 2025-07-28 | End: 2025-08-27

## 2025-07-28 NOTE — TELEPHONE ENCOUNTER
Please review. Protocol Failed; No Protocol      Recent fills: 5/28/2025, 6/30/2025  Last Rx written: 4/28/2025  Last office visit: 4/28/2025      Future Appointments  Date Type Provider Dept   08/04/25 Appointment Neymar Max DO [facility]   Showing future appointments within next 150 days with a meds authorizing provider and meeting all other requirements

## 2025-07-28 NOTE — TELEPHONE ENCOUNTER
Patient called to follow up, she is out of medication.   Has a follow up appointment scheduled on 8/4      [id number] - [place], [place] - [address] [facility] [address] [address] AT [facility] [address], [phone], [phone]

## 2025-08-28 DIAGNOSIS — F98.8 ATTENTION DEFICIT DISORDER (ADD) WITHOUT HYPERACTIVITY: ICD-10-CM

## 2025-08-28 RX ORDER — DEXTROAMPHETAMINE SACCHARATE, AMPHETAMINE ASPARTATE, DEXTROAMPHETAMINE SULFATE AND AMPHETAMINE SULFATE 7.5; 7.5; 7.5; 7.5 MG/1; MG/1; MG/1; MG/1
30 TABLET ORAL DAILY
Qty: 30 TABLET | Refills: 0 | Status: CANCELLED | OUTPATIENT
Start: 2025-08-28

## (undated) DIAGNOSIS — L20.82 FLEXURAL ECZEMA: ICD-10-CM

## (undated) DIAGNOSIS — F98.8 ATTENTION DEFICIT DISORDER (ADD) WITHOUT HYPERACTIVITY: ICD-10-CM

## (undated) DEVICE — PAD GRND W/ CRD FOR RF PAIN MGMT GENRTR

## (undated) DEVICE — STERILE LATEX POWDER-FREE SURGICAL GLOVESWITH NITRILE COATING: Brand: PROTEXIS

## (undated) DEVICE — SOL  .9 3000ML

## (undated) DEVICE — BURR SHVR COOLCUT 13CM 4MM 8

## (undated) DEVICE — SMOKE EVACUATION TUBING 7/8 IN (22 MM) X 10 FT (3.1 M) TUBE WITH 8 IN (20 CM) INTEGRAL WAND & SPONGE GUARD: Brand: CONMED BUFFALO FILTER

## (undated) DEVICE — BLADE 11 SHRP BP SS SRG STRL

## (undated) DEVICE — SUTURE MONOCRYL 4-0 Y845G

## (undated) DEVICE — PAD THRP WRPON UNV PLRCR SHLDR

## (undated) DEVICE — CHLORAPREP 26ML APPLICATOR

## (undated) DEVICE — OR TOWELS NON-STERILE, 100 BULK, BLUE, 400/CS: Brand: PREMIERPRO

## (undated) DEVICE — CANNULA 7MM TWIST AR-6570

## (undated) DEVICE — PENCIL ES BTTN SWCH W/ TIP HOLSTER E-Z CLN

## (undated) DEVICE — STANDARD HYPODERMIC NEEDLE,POLYPROPYLENE HUB: Brand: MONOJECT

## (undated) DEVICE — D AND C PACK: Brand: MEDLINE INDUSTRIES, INC.

## (undated) DEVICE — CONTAINER,SPECIMEN,OR STERILE,4OZ: Brand: MEDLINE

## (undated) DEVICE — T-MAX DISPOSABLE FACE MASK 8 PER BOX

## (undated) DEVICE — GAMMEX® PI HYBRID SIZE 7.5, STERILE POWDER-FREE SURGICAL GLOVE, POLYISOPRENE AND NEOPRENE BLEND: Brand: GAMMEX

## (undated) DEVICE — OR TOWEL, 17" X 26" STERILE, BLUE: Brand: PREMIERPRO

## (undated) DEVICE — AMBIENT SUPER TURBOVAC 90 IFS: Brand: COBLATION

## (undated) DEVICE — SYRINGE BULB 50/CS 48/PLT: Brand: MEDEGEN MEDICAL PRODUCTS, LLC

## (undated) DEVICE — SHOULDER: Brand: MEDLINE INDUSTRIES, INC.

## (undated) DEVICE — SHOULDER STABILIZATION KIT,                                    DISPOSABLE 12 PER BOX

## (undated) DEVICE — SOLUTION IRRIG 1000ML 0.9% NACL USP BTL

## (undated) DEVICE — COVER SGL STRL LGHT HNDL BLU

## (undated) DEVICE — NEEDLE SCORPION AR-13995N

## (undated) DEVICE — SHOULDER P.A.D II: Brand: DEROYAL

## (undated) DEVICE — MEDI-VAC NON-CONDUCTIVE SUCTION TUBING: Brand: CARDINAL HEALTH

## (undated) DEVICE — 12 ML SYRINGE LUER-LOCK TIP: Brand: MONOJECT

## (undated) DEVICE — NEEDLE SPINAL 18X3-1/2 PINK.

## (undated) DEVICE — TUBING IRR 16FT CNT WV 3 ASCP

## (undated) DEVICE — NEEDLE HPO 18GA 1.5IN ECLPS

## (undated) DEVICE — BANDAGE ROLL,100% COTTON, 6 PLY, LARGE: Brand: KERLIX

## (undated) DEVICE — DRAPE SRG 70X60IN SPLT U IMPRV

## (undated) DEVICE — MEGADYNE E-Z CLEAN BALL 5IN

## (undated) DEVICE — SERVICE RENTAL LSR TECH ONLY

## (undated) DEVICE — SYRINGE MED 10ML LL CTRL W/ FNGR GRP CLR BRL

## (undated) DEVICE — ENCORE® LATEX MICRO SIZE 7, STERILE LATEX POWDER-FREE SURGICAL GLOVE: Brand: ENCORE

## (undated) DEVICE — GAUZE SPONGES,12 PLY: Brand: CURITY

## (undated) DEVICE — POLAR CARE CUBE COOLING UNIT

## (undated) DEVICE — 3M™ STERI-STRIP™ REINFORCED ADHESIVE SKIN CLOSURES, R1547, 1/2 IN X 4 IN (12 MM X 100 MM), 6 STRIPS/ENVELOPE: Brand: 3M™ STERI-STRIP™

## (undated) DEVICE — PROCTO SWABS: Brand: DEROYAL

## (undated) DEVICE — ELECTRODE ES W20MM D8MM TAN LLETZ LOOP RND

## (undated) DEVICE — SUTURE FIBERLINK FBRWR 2 26IN

## (undated) DEVICE — BLADE SHVR COOLCUT 13CM 4MM

## (undated) DEVICE — DEPRESSOR TNG L6IN WOOD

## (undated) DEVICE — 3M™ MEDITPORE™ SOFT CLOTH TAPE 6 IN X 10 YD 12 ROLLS/CASE 2966: Brand: 3M™ MEDIPORE™

## (undated) DEVICE — STERILE SURGICAL LUBRICANT, METAL TUBE: Brand: SURGILUBE

## (undated) NOTE — LETTER
7/31/2018          To Whom It May Concern:    Angel Chavez is currently under my medical care. Her restrictions are as follows: no lifting, pushing or pulling with right arm for 6 weeks.     If you require additional information please contact our off

## (undated) NOTE — ED AVS SNAPSHOT
[facility] Emergency [facility]  [address]   [address] [place] [address]  Phone:  [phone]  Fax:  [phone]          Sosa Castorena   MRN: [id number]    Department:  [facility]   Date of Visit:  7/3/2017 visiting [email or web address]    IF THERE IS ANY CHANGE OR WORSENING OF YOUR CONDITION, CALL YOUR PRIMARY CARE PHYSICIAN AT ONCE OR RETURN IMMEDIATELY TO THE EMERGENCY DEPARTMENT.     If you have been prescribed any medication(s), please fill your prescription

## (undated) NOTE — LETTER
AUTHORIZATION FOR SURGICAL OPERATION OR OTHER PROCEDURE    1. I hereby authorize Dr. Cate Brady, and [facility] staff assigned to my case to perform the following operation and/or procedure at the [facility]:  Vulvar biopsy  _______________________________________________________________________________________________      _______________________________________________________________________________________________    2. My physician has explained the nature and purpose of the operation or other procedure, possible alternative methods of treatment, the risks involved, and the possibility of complication to me. I acknowledge that no guarantee has been made as to the result that may be obtained. 3.  I recognize that, during the course of this operation, or other procedure, unforseen conditions may necessitate additional or different procedure than those listed above. I, therefore, further authorize and request that the above named physician, his/her physician assistants or designees perform such procedures as are, in his/her professional opinion, necessary and desirable. 4.  Any tissue or organs removed in the operation or other procedure may be disposed of by and at the discretion of the [facility] and [facility] AT [facility]. 5.  I understand that in the event of a medical emergency, I will be transported by local paramedics to [facility] or other \[facility]\"" emergency department. 6.  I certify that I have read and fully understand the above consent to operation and/or other procedure. 7.  I acknowledge that my physician has explained sedation/analgesia administration to me including the risks and benefits. I consent to the administration of sedation/analgesia as may be necessary or desirable in the judgement of my physician.     Witness signature: ___________________________________________________ Date:  ______/______/_____                    Time:  ________ A.M.  P.M. Patient Name:  ______________________________________________________  (please print)      Patient signature:  ___________________________________________________             Relationship to Patient:           []  Parent    Responsible person                          []  Spouse  In case of minor or                    [] Other  _____________   Incompetent name:  __________________________________________________                               (please print)      _____________      Responsible person  In case of minor or  Incompetent signature:  _______________________________________________    Statement of Physician  My signature below affirms that prior to the time of the procedure, I have explained to the patient and/or his/her guardian, the risks and benefits involved in the proposed treatment and any reasonable alternative to the proposed treatment. I have also explained the risks and benefits involved in the refusal of the proposed treatment and have answered the patient's questions.                         Date:  ______/______/_______  Provider                      Signature:  __________________________________________________________       Time:  ___________ A.M    P.M.

## (undated) NOTE — ED AVS SNAPSHOT
[facility] Emergency [facility]  [address]   [address] [place] [address]  Phone:  [phone]  Fax:  [phone]          Angel Chavez   MRN: [id number]    Department:  [facility]   Date of Visit:  7/2/2017 visiting [email or web address]    IF THERE IS ANY CHANGE OR WORSENING OF YOUR CONDITION, CALL YOUR PRIMARY CARE PHYSICIAN AT ONCE OR RETURN IMMEDIATELY TO THE EMERGENCY DEPARTMENT.     If you have been prescribed any medication(s), please fill your prescription

## (undated) NOTE — LETTER
08/20/19        [address]  [address] [id number]      Dear Brittany Borrero,    [facility] records indicate that you have outstanding lab work and or testing that was ordered for you and has not yet been completed:  Orders Placed This Encount

## (undated) NOTE — LETTER
6/25/2020              Brendasatish Rangeltramaine        [address] [facility] [address] [address] [facility]         Dear Peter Holloway,    It was a pleasure to see you. Your PAP test was normal.  There is no need for further testing at this time.   I look forw

## (undated) NOTE — LETTER
5/9/2018          To Whom It May Concern:    Richelle Walsh is under my medical care. Evonne Bella will be having surgery on 5-9-18. She will require assistance at home after her surgery.   Please allow Khushboo's mother, Ms. María Simpson, to be off of work f

## (undated) NOTE — ED AVS SNAPSHOT
Kelly Dyer   MRN: [id number]    Department:  [facility] Emergency Department   Date of Visit:  10/12/2017           Disclosure     Insurance plans vary and the physician(s) referred by the ER may not be covered by your plan.  Please contac CARE PHYSICIAN AT ONCE OR RETURN IMMEDIATELY TO THE EMERGENCY DEPARTMENT. If you have been prescribed any medication(s), please fill your prescription right away and begin taking the medication(s) as directed.   If you believe that any of the medications

## (undated) NOTE — LETTER
7/31/2018          To Whom It May Concern:    Kacy Jabari is currently under my medical care. Jackson Russell was in my office late this afternoon. If you require additional information please contact our office.         Sincerely,    Adam Roca

## (undated) NOTE — MR AVS SNAPSHOT
[address], [address]  [address] [facility]  [phone]               Thank you for choosing us for your health care visit with Holden Jackson DO.   We are glad to serve you and happy to provide you with this summary Referral Orders      Normal Orders This Visit    NEPHROLOGY - INTERNAL [[id number] CPT(R)]  Order #:  [id number]                  **REFERRAL REQUEST**    Your physician has referred you to a specialist.  Your physician or the clinic staff will provide you Take 1 capsule (30 mg total) by mouth daily.    Commonly known as:  ADDERALL XR           * Enalapril Maleate 10 MG Tabs   TAKE 1 TABLET BY MOUTH   TWICE A DAY   Commonly known as:  VASOTEC           * Enalapril Maleate 5 MG Tabs   TAKE 1 TABLET BY MOUTH Visit [facility] online at  [email or web address]

## (undated) NOTE — LETTER
6/29/2018          To Whom It May Concern:    Richelle Walsh is currently under my medical care and may return to work at this time. She may work with the following restrictions:  No lifting, pushing or pulling with the right arm for 6 weeks.     If you

## (undated) NOTE — LETTER
ASTHMA ACTION PLAN for Khushboo Milan     : 1980     Date: 24  Doctor:  Neymar Max DO  Phone for doctor or clinic: formerly [facility], [facility]  [address] [address] [phone]           ACT Goal: 20 or greater    Call your provider if you require your rescue/quick reliever medication more than 2-3 times in a 24 hour period.    If you require your rescue inhaler/medication more than 2-3 times weekly, your asthma may not be under proper control and you should seek medical attention.    *Quick Relievers are Xopenex and Albuterol*    You can use the colors of a traffic light to help learn about your asthma medicines.  Year Round       1. Green - Go! % of Personal Best Peak Flow   Use controller medicine.   Breathing is good  No cough or wheeze  Can work and play Medicine How much to take When to take it    Medications       Sympathomimetics Instructions     albuterol 108 (90 Base) MCG/ACT Inhalation Aero Soln INHALE TWO PUFFS INTO THE LUNGS EVERY 4 (FOUR) HOURS AS NEEDED FOR WHEEZING OR SHORTNESS OF BREATH                    2. Yellow - Caution. 50-79% Personal Best Peak Flow  Use reliever medicine to keep an asthma attack from getting bad.   Cough  Quick Relievers  Wheezing  Tight Chest  Wake up at night Medicine How much to take When to take it    If symptoms are not improving in 24-48 hrs, call office for further instructions  Medications       Sympathomimetics Instructions     albuterol 108 (90 Base) MCG/ACT Inhalation Aero Soln INHALE TWO PUFFS INTO THE LUNGS EVERY 4 (FOUR) HOURS AS NEEDED FOR WHEEZING OR SHORTNESS OF BREATH                    3. Red - Stop! Danger! <50% Personal Best Peak Flow  Continue Controller Medications But ADD:   Medicine not helping  Breathing is hard and fast  Nose opens wide  Can't walk  Ribs show  Can't talk well Medicine How much to take When to take it    If your symptoms do not improve in ONE hour -  go to  the emergency room or call 911 immediately! If symptoms improve, call office for appointment immediately.    Albuterol inhaler 2 puffs every 20 minutes for three treatments       Don't forget:  Rinse mouth after using inhaler  Use spacer for inhaler  Remember to get your Flu vaccine every fall!    [x] Asthma Action Plan reviewed with the caregiver and patient, and a copy of the plan was given to the patient/caregiver.   [] Asthma Action Plan reviewed with the caregiver and patient on the phone, and copy mailed to patient/caregiver or sent via [employer].     Signatures:  1/8/2024   Provider  Neymar Max,  Patient  Khushboo Bjorn Caretaker

## (undated) NOTE — MR AVS SNAPSHOT
[address], [address]  [address] [facility]  [phone]               Thank you for choosing us for your health care visit with Flaca Zuleta DO.   We are glad to serve you and happy to provide you with this summary Inhale 2 puffs into the lungs 2 (two) times daily. Commonly known as:  FLOVENT HFA           promethazine-codeine 6.25-10 MG/5ML Syrp   Take 5 mL by mouth every 6 (six) hours as needed for cough.    Commonly known as:  PHENERGAN with CODEINE           tri [place].tn